# Patient Record
Sex: MALE | Race: WHITE | Employment: OTHER | URBAN - NONMETROPOLITAN AREA
[De-identification: names, ages, dates, MRNs, and addresses within clinical notes are randomized per-mention and may not be internally consistent; named-entity substitution may affect disease eponyms.]

---

## 2018-08-06 ENCOUNTER — APPOINTMENT (OUTPATIENT)
Dept: CT IMAGING | Age: 68
End: 2018-08-06
Payer: OTHER GOVERNMENT

## 2018-08-06 ENCOUNTER — HOSPITAL ENCOUNTER (OUTPATIENT)
Age: 68
Setting detail: OBSERVATION
Discharge: HOME | End: 2018-08-08
Attending: EMERGENCY MEDICINE | Admitting: INTERNAL MEDICINE
Payer: OTHER GOVERNMENT

## 2018-08-06 DIAGNOSIS — R77.8 ELEVATED TROPONIN: ICD-10-CM

## 2018-08-06 DIAGNOSIS — R07.9 CHEST PAIN, UNSPECIFIED TYPE: Primary | ICD-10-CM

## 2018-08-06 LAB
ALBUMIN SERPL-MCNC: 4.2 G/DL (ref 3.5–5.1)
ALP BLD-CCNC: 57 U/L (ref 38–126)
ALT SERPL-CCNC: 27 U/L (ref 11–66)
ANION GAP SERPL CALCULATED.3IONS-SCNC: 13 MEQ/L (ref 8–16)
AST SERPL-CCNC: 16 U/L (ref 5–40)
AVERAGE GLUCOSE: 135 MG/DL (ref 70–126)
BASOPHILS # BLD: 0.4 %
BASOPHILS ABSOLUTE: 0 THOU/MM3 (ref 0–0.1)
BILIRUB SERPL-MCNC: 2 MG/DL (ref 0.3–1.2)
BILIRUBIN DIRECT: 0.4 MG/DL (ref 0–0.3)
BUN BLDV-MCNC: 14 MG/DL (ref 7–22)
CALCIUM SERPL-MCNC: 9.3 MG/DL (ref 8.5–10.5)
CHLORIDE BLD-SCNC: 99 MEQ/L (ref 98–111)
CO2: 24 MEQ/L (ref 23–33)
CREAT SERPL-MCNC: 0.7 MG/DL (ref 0.4–1.2)
EKG ATRIAL RATE: 63 BPM
EKG P AXIS: 17 DEGREES
EKG P-R INTERVAL: 164 MS
EKG Q-T INTERVAL: 404 MS
EKG QRS DURATION: 90 MS
EKG QTC CALCULATION (BAZETT): 413 MS
EKG R AXIS: -6 DEGREES
EKG T AXIS: 46 DEGREES
EKG VENTRICULAR RATE: 63 BPM
EOSINOPHIL # BLD: 1.1 %
EOSINOPHILS ABSOLUTE: 0.1 THOU/MM3 (ref 0–0.4)
ERYTHROCYTE [DISTWIDTH] IN BLOOD BY AUTOMATED COUNT: 12 % (ref 11.5–14.5)
ERYTHROCYTE [DISTWIDTH] IN BLOOD BY AUTOMATED COUNT: 36.4 FL (ref 35–45)
GFR SERPL CREATININE-BSD FRML MDRD: > 90 ML/MIN/1.73M2
GLUCOSE BLD-MCNC: 224 MG/DL (ref 70–108)
HBA1C MFR BLD: 6.5 % (ref 4.4–6.4)
HCT VFR BLD CALC: 40.3 % (ref 42–52)
HEMOGLOBIN: 14.8 GM/DL (ref 14–18)
IMMATURE GRANS (ABS): 0.03 THOU/MM3 (ref 0–0.07)
IMMATURE GRANULOCYTES: 0.4 %
LYMPHOCYTES # BLD: 16.8 %
LYMPHOCYTES ABSOLUTE: 1.2 THOU/MM3 (ref 1–4.8)
MCH RBC QN AUTO: 31.2 PG (ref 26–33)
MCHC RBC AUTO-ENTMCNC: 36.7 GM/DL (ref 32.2–35.5)
MCV RBC AUTO: 85 FL (ref 80–94)
MONOCYTES # BLD: 10.3 %
MONOCYTES ABSOLUTE: 0.7 THOU/MM3 (ref 0.4–1.3)
NUCLEATED RED BLOOD CELLS: 0 /100 WBC
OSMOLALITY CALCULATION: 279.4 MOSMOL/KG (ref 275–300)
PLATELET # BLD: 132 THOU/MM3 (ref 130–400)
PMV BLD AUTO: 10 FL (ref 9.4–12.4)
POTASSIUM SERPL-SCNC: 4.6 MEQ/L (ref 3.5–5.2)
PRO-BNP: 60.1 PG/ML (ref 0–900)
RBC # BLD: 4.74 MILL/MM3 (ref 4.7–6.1)
SEG NEUTROPHILS: 71 %
SEGMENTED NEUTROPHILS ABSOLUTE COUNT: 5 THOU/MM3 (ref 1.8–7.7)
SODIUM BLD-SCNC: 136 MEQ/L (ref 135–145)
TOTAL PROTEIN: 7.5 G/DL (ref 6.1–8)
TROPONIN T: 0.04 NG/ML
TROPONIN T: 0.04 NG/ML
WBC # BLD: 7 THOU/MM3 (ref 4.8–10.8)

## 2018-08-06 PROCEDURE — 99285 EMERGENCY DEPT VISIT HI MDM: CPT

## 2018-08-06 PROCEDURE — 96372 THER/PROPH/DIAG INJ SC/IM: CPT

## 2018-08-06 PROCEDURE — G0378 HOSPITAL OBSERVATION PER HR: HCPCS

## 2018-08-06 PROCEDURE — 93010 ELECTROCARDIOGRAM REPORT: CPT | Performed by: INTERNAL MEDICINE

## 2018-08-06 PROCEDURE — 6360000004 HC RX CONTRAST MEDICATION: Performed by: EMERGENCY MEDICINE

## 2018-08-06 PROCEDURE — 6360000002 HC RX W HCPCS: Performed by: INTERNAL MEDICINE

## 2018-08-06 PROCEDURE — 99220 PR INITIAL OBSERVATION CARE/DAY 70 MINUTES: CPT | Performed by: INTERNAL MEDICINE

## 2018-08-06 PROCEDURE — 6370000000 HC RX 637 (ALT 250 FOR IP): Performed by: INTERNAL MEDICINE

## 2018-08-06 PROCEDURE — 83036 HEMOGLOBIN GLYCOSYLATED A1C: CPT

## 2018-08-06 PROCEDURE — 82248 BILIRUBIN DIRECT: CPT

## 2018-08-06 PROCEDURE — 80053 COMPREHEN METABOLIC PANEL: CPT

## 2018-08-06 PROCEDURE — 93005 ELECTROCARDIOGRAM TRACING: CPT | Performed by: EMERGENCY MEDICINE

## 2018-08-06 PROCEDURE — 71275 CT ANGIOGRAPHY CHEST: CPT

## 2018-08-06 PROCEDURE — 85025 COMPLETE CBC W/AUTO DIFF WBC: CPT

## 2018-08-06 PROCEDURE — 83880 ASSAY OF NATRIURETIC PEPTIDE: CPT

## 2018-08-06 PROCEDURE — 36415 COLL VENOUS BLD VENIPUNCTURE: CPT

## 2018-08-06 PROCEDURE — 84484 ASSAY OF TROPONIN QUANT: CPT

## 2018-08-06 RX ORDER — LOSARTAN POTASSIUM 100 MG/1
100 TABLET ORAL DAILY
Status: DISCONTINUED | OUTPATIENT
Start: 2018-08-07 | End: 2018-08-08 | Stop reason: HOSPADM

## 2018-08-06 RX ORDER — ASPIRIN 325 MG
325 TABLET ORAL DAILY
Status: DISCONTINUED | OUTPATIENT
Start: 2018-08-06 | End: 2018-08-08 | Stop reason: HOSPADM

## 2018-08-06 RX ORDER — LIDOCAINE 50 MG/G
1 PATCH TOPICAL EVERY 24 HOURS
Status: DISCONTINUED | OUTPATIENT
Start: 2018-08-06 | End: 2018-08-08 | Stop reason: HOSPADM

## 2018-08-06 RX ORDER — ALBUTEROL SULFATE 90 UG/1
2 AEROSOL, METERED RESPIRATORY (INHALATION) 4 TIMES DAILY
Status: DISCONTINUED | OUTPATIENT
Start: 2018-08-06 | End: 2018-08-07 | Stop reason: ALTCHOICE

## 2018-08-06 RX ORDER — ACETAMINOPHEN 325 MG/1
650 TABLET ORAL EVERY 4 HOURS PRN
Status: DISCONTINUED | OUTPATIENT
Start: 2018-08-06 | End: 2018-08-08 | Stop reason: HOSPADM

## 2018-08-06 RX ORDER — SIMVASTATIN 40 MG
40 TABLET ORAL NIGHTLY
Status: DISCONTINUED | OUTPATIENT
Start: 2018-08-06 | End: 2018-08-08 | Stop reason: HOSPADM

## 2018-08-06 RX ORDER — IBUPROFEN 400 MG/1
400 TABLET ORAL EVERY 6 HOURS PRN
Status: DISCONTINUED | OUTPATIENT
Start: 2018-08-06 | End: 2018-08-07

## 2018-08-06 RX ORDER — PANTOPRAZOLE SODIUM 40 MG/1
40 TABLET, DELAYED RELEASE ORAL
Status: DISCONTINUED | OUTPATIENT
Start: 2018-08-07 | End: 2018-08-08 | Stop reason: HOSPADM

## 2018-08-06 RX ORDER — M-VIT,TX,IRON,MINS/CALC/FOLIC 27MG-0.4MG
1 TABLET ORAL DAILY
Status: DISCONTINUED | OUTPATIENT
Start: 2018-08-07 | End: 2018-08-08 | Stop reason: HOSPADM

## 2018-08-06 RX ADMIN — ENOXAPARIN SODIUM 40 MG: 40 INJECTION SUBCUTANEOUS at 21:17

## 2018-08-06 RX ADMIN — ASPIRIN 325 MG: 325 TABLET ORAL at 21:17

## 2018-08-06 RX ADMIN — IBUPROFEN 400 MG: 400 TABLET ORAL at 21:02

## 2018-08-06 RX ADMIN — SIMVASTATIN 40 MG: 40 TABLET, FILM COATED ORAL at 21:19

## 2018-08-06 RX ADMIN — IOPAMIDOL 80 ML: 755 INJECTION, SOLUTION INTRAVENOUS at 12:24

## 2018-08-06 ASSESSMENT — PAIN SCALES - GENERAL
PAINLEVEL_OUTOF10: 4

## 2018-08-06 ASSESSMENT — PAIN DESCRIPTION - PAIN TYPE
TYPE: ACUTE PAIN

## 2018-08-06 ASSESSMENT — PAIN DESCRIPTION - DESCRIPTORS
DESCRIPTORS: ACHING
DESCRIPTORS: ACHING

## 2018-08-06 ASSESSMENT — PAIN DESCRIPTION - LOCATION
LOCATION: OTHER (COMMENT)
LOCATION: CHEST
LOCATION: CHEST

## 2018-08-06 NOTE — ED NOTES
Pt ambulated to and from bathroom with stand by assistance. Pt tolerated well.       Rosario Hoff RN  08/06/18 6760

## 2018-08-06 NOTE — ED PROVIDER NOTES
CT, Ultrasound and MRI are read by the radiologist.  CTA Chest W WO Contrast   Final Result      No acute pulmonary artery embolism. Small left pleural effusion which may be loculated and adjacent pleural calcifications. Correlate for prior infection or sepsis related lung disease. This is similar in appearance to prior study. Gallstones. **This report has been created using voice recognition software. It may contain minor errors which are inherent in voice recognition technology. **      Final report electronically signed by Dr. Quynh Garcia on 8/6/2018 12:56 PM          LABS:   Labs Reviewed   CBC WITH AUTO DIFFERENTIAL - Abnormal; Notable for the following:        Result Value    Hematocrit 40.3 (*)     MCHC 36.7 (*)     All other components within normal limits   BASIC METABOLIC PANEL - Abnormal; Notable for the following:     Glucose 224 (*)     All other components within normal limits   HEPATIC FUNCTION PANEL - Abnormal; Notable for the following: Total Bilirubin 2.0 (*)     Bilirubin, Direct 0.4 (*)     All other components within normal limits   TROPONIN - Abnormal; Notable for the following:     Troponin T 0.045 (*)     All other components within normal limits   BRAIN NATRIURETIC PEPTIDE   ANION GAP   GLOMERULAR FILTRATION RATE, ESTIMATED   OSMOLALITY       EMERGENCY DEPARTMENT COURSE:   Vitals:    Vitals:    08/06/18 0939 08/06/18 1142 08/06/18 1252   BP: 139/89 125/81 134/72   Pulse: 61 63 61   Resp: 20 18 18   Temp: 98 °F (36.7 °C)     TempSrc: Oral     SpO2: 96% 96% 97%   Weight: 290 lb (131.5 kg)     Height: 5' 10\" (1.778 m)       He is pain-free at the time of evaluation but he does get symptoms when he exerts himself. His troponin is actually a bit elevated. I discussed case with the hospitalist to arrange for admission. He is to be negative for pulmonary embolism. CRITICAL CARE:   10 min    CONSULTS:  Dr Newman Epp:  None    FINAL IMPRESSION      1.  Chest pain, unspecified type    2. Elevated troponin          DISPOSITION/PLAN   Admitted    DISCHARGE MEDICATIONS:  New Prescriptions    No medications on file       (Please note that portions of this note were completed with a voice recognition program.  Efforts were made to edit the dictations but occasionally words are mis-transcribed.)    Scribe:  Sofia Bradley 8/6/18 11:00 AM Scribing for and in the presence of Timothy Barber DO. Signed by: Eladio Ellis. 8/6/18 1:46 PM    Provider:  I personally performed the services described in the documentation, reviewed and edited the documentation which was dictated to the scribe in my presence, and it accurately records my words and actions.     Timothy Barber DO 8/6/18 1:46 PM       Timothy Barber DO  08/06/18 2001

## 2018-08-06 NOTE — ED TRIAGE NOTES
Patient presents to ED with complaint of chest pressure that started 2 days ago. Patient reports he had an episode similar to this 3 months ago, took some tums and felt relief. Tried tums this time around and has had no relief. Patient reports he was trimming shrubs on Saturday when this pain started. States he is short of breath with it - worsens when bending over. Patient has had open heart in 2003, but denies any heart problems since then. Patient currently pain 2/10, but reports it increases with coughing or moving around.

## 2018-08-06 NOTE — PROGRESS NOTES
Pt admitted to  6K22 in a wheelchair. Complaints: Chest pain / discomfort. IV in the forearm right, condition patent and no redness. IV site free of s/s of infection or infiltration. Vital signs obtained. Assessment and data collection initiated. Two nurse skin assessment performed by Naima Conner and [facility] RN. Oriented to room. Policies and procedures for 6K explained. All questions answered with no further questions at this time. Fall prevention and safety brochure discussed with patient. Bed alarm on. Call light in reach. The best day to schedule a follow up Dr appointment is: Wednesday a.m.

## 2018-08-06 NOTE — ED NOTES
Pt updated on admission process. Pt provided with turkey sandwich and emery mist. No concerns voiced.      Nicole Dubon, RN  08/06/18 3774

## 2018-08-07 LAB
ANION GAP SERPL CALCULATED.3IONS-SCNC: 13 MEQ/L (ref 8–16)
BUN BLDV-MCNC: 14 MG/DL (ref 7–22)
CALCIUM SERPL-MCNC: 9.2 MG/DL (ref 8.5–10.5)
CHLORIDE BLD-SCNC: 101 MEQ/L (ref 98–111)
CO2: 21 MEQ/L (ref 23–33)
CREAT SERPL-MCNC: 0.7 MG/DL (ref 0.4–1.2)
ERYTHROCYTE [DISTWIDTH] IN BLOOD BY AUTOMATED COUNT: 12.3 % (ref 11.5–14.5)
ERYTHROCYTE [DISTWIDTH] IN BLOOD BY AUTOMATED COUNT: 39.3 FL (ref 35–45)
GFR SERPL CREATININE-BSD FRML MDRD: > 90 ML/MIN/1.73M2
GLUCOSE BLD-MCNC: 194 MG/DL (ref 70–108)
HCT VFR BLD CALC: 39.3 % (ref 42–52)
HEMOGLOBIN: 13.7 GM/DL (ref 14–18)
MCH RBC QN AUTO: 30.6 PG (ref 26–33)
MCHC RBC AUTO-ENTMCNC: 34.9 GM/DL (ref 32.2–35.5)
MCV RBC AUTO: 87.7 FL (ref 80–94)
PLATELET # BLD: 123 THOU/MM3 (ref 130–400)
PMV BLD AUTO: 9.9 FL (ref 9.4–12.4)
POTASSIUM SERPL-SCNC: 4.4 MEQ/L (ref 3.5–5.2)
RBC # BLD: 4.48 MILL/MM3 (ref 4.7–6.1)
SODIUM BLD-SCNC: 135 MEQ/L (ref 135–145)
TROPONIN T: 0.04 NG/ML
WBC # BLD: 6.6 THOU/MM3 (ref 4.8–10.8)

## 2018-08-07 PROCEDURE — 84484 ASSAY OF TROPONIN QUANT: CPT

## 2018-08-07 PROCEDURE — 6360000002 HC RX W HCPCS: Performed by: INTERNAL MEDICINE

## 2018-08-07 PROCEDURE — 85027 COMPLETE CBC AUTOMATED: CPT

## 2018-08-07 PROCEDURE — 96372 THER/PROPH/DIAG INJ SC/IM: CPT

## 2018-08-07 PROCEDURE — 99225 PR SBSQ OBSERVATION CARE/DAY 25 MINUTES: CPT | Performed by: INTERNAL MEDICINE

## 2018-08-07 PROCEDURE — G0378 HOSPITAL OBSERVATION PER HR: HCPCS

## 2018-08-07 PROCEDURE — 80048 BASIC METABOLIC PNL TOTAL CA: CPT

## 2018-08-07 PROCEDURE — 36415 COLL VENOUS BLD VENIPUNCTURE: CPT

## 2018-08-07 PROCEDURE — 6370000000 HC RX 637 (ALT 250 FOR IP): Performed by: INTERNAL MEDICINE

## 2018-08-07 RX ORDER — IBUPROFEN 400 MG/1
400 TABLET ORAL EVERY 6 HOURS PRN
Status: DISCONTINUED | OUTPATIENT
Start: 2018-08-07 | End: 2018-08-08 | Stop reason: HOSPADM

## 2018-08-07 RX ADMIN — ASPIRIN 325 MG: 325 TABLET ORAL at 21:19

## 2018-08-07 RX ADMIN — LOSARTAN POTASSIUM 100 MG: 100 TABLET, FILM COATED ORAL at 07:55

## 2018-08-07 RX ADMIN — ENOXAPARIN SODIUM 40 MG: 40 INJECTION SUBCUTANEOUS at 21:06

## 2018-08-07 RX ADMIN — IBUPROFEN 400 MG: 400 TABLET ORAL at 21:20

## 2018-08-07 RX ADMIN — PANTOPRAZOLE SODIUM 40 MG: 40 TABLET, DELAYED RELEASE ORAL at 04:46

## 2018-08-07 RX ADMIN — MULTIPLE VITAMINS W/ MINERALS TAB 1 TABLET: TAB at 07:55

## 2018-08-07 RX ADMIN — SIMVASTATIN 40 MG: 40 TABLET, FILM COATED ORAL at 21:06

## 2018-08-07 RX ADMIN — ENOXAPARIN SODIUM 40 MG: 40 INJECTION SUBCUTANEOUS at 07:55

## 2018-08-07 ASSESSMENT — PAIN SCALES - GENERAL
PAINLEVEL_OUTOF10: 0
PAINLEVEL_OUTOF10: 4
PAINLEVEL_OUTOF10: 0

## 2018-08-07 NOTE — PROGRESS NOTES
72 hours. No results for input(s): Nighat Funk in the last 72 hours. Urinalysis:    No results found for: Delmon Maria Ines, BACTERIA, RBCUA, BLOODU, [address], [address]    Radiology:  CTA Chest W WO Contrast   Final Result      No acute pulmonary artery embolism. Small left pleural effusion which may be loculated and adjacent pleural calcifications. Correlate for prior infection or sepsis related lung disease. This is similar in appearance to prior study. Gallstones. **This report has been created using voice recognition software. It may contain minor errors which are inherent in voice recognition technology. **      Final report electronically signed by Dr. Leslie Vanegas on 8/6/2018 12:56 PM        Cta Chest W Wo Contrast    Result Date: 8/6/2018  PROCEDURE: CTA CHEST W WO CONTRAST CLINICAL INFORMATION: r/o PE. COMPARISON: No prior study. TECHNIQUE: 1.5 mm axial images were obtained through the chest after the administration of IV contrast.  A non-contrast localizer was obtained. 3D reconstructions were performed on the scanner to include sagittal and bilateral oblique images through the  chest. 80 mL Isovue-370 were administered intravenously. All CT scans at this facility use dose modulation, iterative reconstruction, and/or weight-based dosing when appropriate to reduce radiation dose to as low as reasonably achievable. FINDINGS: Thyroid gland is unremarkable. Thoracic aorta is normal caliber. Adequate opacification of the pulmonary artery. No acute pulmonary artery embolism. Pleural calcifications suggesting prior infection or asbestos exposure. Small left pleural effusion which may be loculated or chronic. There is adjacent consolidation or atelectasis/scarring. No cardiomegaly. Atherosclerotic changes and coronary artery calcifications. No pericardial effusion. Median sternotomy. No acute osseous findings. Upper abdominal images demonstrate gallstones. Fatty infiltration of the liver.

## 2018-08-07 NOTE — CARE COORDINATION
participate in local refill/ meds to beds program?  No  Type of Home Care Services:  None  Patient expects to be discharged to:  Home with wife  Expected Discharge date:  08/07/18  Follow Up Appointment: Best Day/ Time: Wednesday AM    Discharge Plan: Met with Christina Salgado. He currently lives at home with his wife. Plan is to return home at discharge. Denies need for DME or HH. [facility] [address] [facility] and patients wish to remain here.      Evaluation: no

## 2018-08-07 NOTE — PLAN OF CARE
Problem: Falls - Risk of:  Goal: Will remain free from falls  Will remain free from falls   Outcome: Ongoing  Call light within reach. Side rails up x2. Bed alarm on. Non skid slippers available. Problem: Pain:  Goal: Pain level will decrease  Pain level will decrease   Outcome: Ongoing  Patient free from pain this shift. Pain rated on 0-10 pain rating scale. Will continue to reassess. Comments: Care plan reviewed with patient. Patient verbalize understanding of the plan of care and contribute to goal setting.

## 2018-08-07 NOTE — H&P
pain.  No nausea, vomiting, diarrhea, melena, or bright red  blood per rectum. No dysuria. No unintentional weight loss. The rest of  the review of systems is negative with exception of what is described in  the HPI. PAST MEDICAL HISTORY:  The patient has a history of coronary artery  disease, ischemic cardiomyopathy, pulmonary embolism, hypertension, and  hyperlipidemia. PAST SURGICAL HISTORY:  CABG, colonoscopy, hydrocele, inguinal hernia  repair, inferior vena cava filter, and knee surgery. FAMILY HISTORY:  Mother with cancer, high blood pressure, and kidney  disease. Father with heart disease. SOCIAL HISTORY:  The patient is a former smoker. No alcohol use. No  illicit drug use. MEDICATION RECONCILIATION:  The patient takes the following medications at  home:  Combivent, losartan, simvastatin, omega-3 fatty acids,  multivitamins, glucosamine, and aspirin 325 mg daily. PHYSICAL EXAMINATION:  VITAL SIGNS:  Temperature 98, blood pressure 134/72, heart rate is 61,  respiratory rate 18, and saturation 98% on room air. GENERAL:  The patient is lying on the bed, in no distress. HEENT:  PERRLA and EOMI. NECK:  No JVD. CARDIOVASCULAR:  Regular rate and rhythm. Systolic murmur in aortic area. No pericardial rub. RESPIRATORY:  Clear to auscultation bilaterally. ABDOMEN:  Soft, nontender, and nondistended. Positive bowel sounds. :  Negative suprapubic. No CVA tenderness. EXTREMITIES:  Lower extremities without edema. NEUROLOGIC:  Alert and oriented x3 with no focal deficit. MUSCULOSKELETAL:  Range of motion preserved in all extremities. SKIN:  No hematoma, petechiae, or ecchymosis. PSYCHIATRIC:  Proper affect. LABORATORY DATA:  The patient had a sodium of 136, potassium 4.6, chloride  is 99, CO2 24, BUN 14, creatinine 0.7, anion gap of 13, glucose of 224,  calcium 9.3. BNP of 60. Troponin 0.045. Albumin of 4.2, bilirubin 2.0,  ALT/AST 27/16, and alkaline phosphatase 57. White count of 7, hemoglobin  14.8, and platelet count of 264. The patient does not have a history of  diabetes. ASSESSMENT AND PLAN:  The patient is a 41-year-old patient with past  medical history of coronary artery disease, ischemic cardiomyopathy, status  post revascularization and CABG who comes to the hospital with chest pain. PROBLEMS:  1. Chest pain, more pleuritic in nature probably secondary to pleuritis. No evidence of pericarditis on EKG. Clinically, looks like it was  pleuritis. Upon the request of the ER physician, the patient will stay  under observation for his troponin cycling x3. In the meantime, we will  keep him on telemetry. The patient received his dose of aspirin. We will  make sure the patient's heart rate remains below 80. At this time, the  patient is not going to be started on heparin drip since his pain is fully  resolved. Clinically, by history, does not look like angina equivalent. The patient reports that his pain gets worse when he coughs. Then, also  can be musculoskeletal because of his work in the yard. So, we will make  sure the patient has morphine as needed and nitroglycerin p.r.n.  2.  For hyperglycemia, the patient has no _____ diagnosis of diabetes  mellitus type 2. We will do an HbA1c. Regular diet for now. 3.  For hypertension, the patient will be started again on losartan. 4.  For hypercholesterolemia, we will continue with simvastatin. 5.  For his asbestosis/ILD, we will continue with his Combivent. 6.  For GI prophylaxis, the patient will be on Protonix and for DVT  prophylaxis, Lovenox.         Rebecca Delgadillo MD    D: 08/06/2018 14:46:32       T: 08/06/2018 16:22:18     [email or web address]  Job#: [id number]     Doc#: [id number]    CC:

## 2018-08-08 VITALS
HEART RATE: 56 BPM | TEMPERATURE: 97.6 F | HEIGHT: 70 IN | BODY MASS INDEX: 40.7 KG/M2 | RESPIRATION RATE: 18 BRPM | SYSTOLIC BLOOD PRESSURE: 138 MMHG | DIASTOLIC BLOOD PRESSURE: 78 MMHG | OXYGEN SATURATION: 97 % | WEIGHT: 284.3 LBS

## 2018-08-08 LAB — TROPONIN T: < 0.01 NG/ML

## 2018-08-08 PROCEDURE — 6360000002 HC RX W HCPCS: Performed by: INTERNAL MEDICINE

## 2018-08-08 PROCEDURE — 99217 PR OBSERVATION CARE DISCHARGE MANAGEMENT: CPT | Performed by: INTERNAL MEDICINE

## 2018-08-08 PROCEDURE — 6370000000 HC RX 637 (ALT 250 FOR IP): Performed by: INTERNAL MEDICINE

## 2018-08-08 PROCEDURE — G0378 HOSPITAL OBSERVATION PER HR: HCPCS

## 2018-08-08 PROCEDURE — 84484 ASSAY OF TROPONIN QUANT: CPT

## 2018-08-08 PROCEDURE — 99231 SBSQ HOSP IP/OBS SF/LOW 25: CPT | Performed by: NURSE PRACTITIONER

## 2018-08-08 PROCEDURE — 96372 THER/PROPH/DIAG INJ SC/IM: CPT

## 2018-08-08 PROCEDURE — 36415 COLL VENOUS BLD VENIPUNCTURE: CPT

## 2018-08-08 RX ADMIN — LOSARTAN POTASSIUM 100 MG: 100 TABLET, FILM COATED ORAL at 08:15

## 2018-08-08 RX ADMIN — MULTIPLE VITAMINS W/ MINERALS TAB 1 TABLET: TAB at 08:15

## 2018-08-08 RX ADMIN — ENOXAPARIN SODIUM 40 MG: 40 INJECTION SUBCUTANEOUS at 08:15

## 2018-08-08 RX ADMIN — PANTOPRAZOLE SODIUM 40 MG: 40 TABLET, DELAYED RELEASE ORAL at 04:55

## 2018-08-08 ASSESSMENT — PAIN SCALES - GENERAL
PAINLEVEL_OUTOF10: 0
PAINLEVEL_OUTOF10: 0

## 2018-08-08 NOTE — PROGRESS NOTES
Cardiology Progress Note      Patient:  Teodora Webb  YOB: 1950  MRN: [id number]   Acct: [de-identified]  Admit Date:  8/6/2018  Primary Cardiologist:  Dr. Meghna Smith, seen by Dr. Rudy Spann    Chief Complaint: Epigastric chest pain    Subjective (Events in last 24 hours): Resting in bedside chair talking with spouse. Alert, in nad. Denies chest pain, palpitations, sob. States pain has been relieved with lidoderm patch and aleve.       Objective:   BP (!) 144/80   Pulse 55   Temp 97.8 °F (36.6 °C) (Oral)   Resp 18   Ht 5' 10\" (1.778 m)   Wt 284 lb 4.8 oz (129 kg)   SpO2 97%   BMI 40.79 kg/m²        TELEMETRY: SR rate in the 70's    Physical Exam:  General Appearance: alert and oriented to person, place and time, in no acute distress  Cardiovascular: normal rate, regular rhythm, normal S1 and S2, no murmurs, rubs, clicks, or gallops, distal pulses intact, no carotid bruits, no JVD  Pulmonary/Chest: clear to auscultation bilaterally- no wheezes, rales or rhonchi, normal air movement, no respiratory distress  Abdomen: soft, non-tender, non-distended, normal bowel sounds, no masses   Extremities: no cyanosis, clubbing or edema, pulses palpable   Skin: warm and dry, no rash or erythema  Head: normocephalic and atraumatic  Eyes: pupils equal, round, and reactive to light  Neck: supple and non-tender without mass, no thyromegaly   Musculoskeletal: normal range of motion, no joint swelling, deformity or tenderness  Neurological: alert, oriented, normal speech, no focal findings or movement disorder noted    Medications:    albuterol-ipratropium  1 puff Inhalation 4x Daily    aspirin  325 mg Oral Daily    losartan  100 mg Oral Daily    therapeutic multivitamin-minerals  1 tablet Oral Daily    simvastatin  40 mg Oral Nightly    pantoprazole  40 mg Oral QAM AC    lidocaine  1 patch Transdermal Q24H    enoxaparin  40 mg Subcutaneous Q12H         ibuprofen 400 mg Q6H PRN   acetaminophen 650 mg Q4H PRN Diagnostics:  EK18  Normal sinus rhythm  Normal ECG  When compared with ECG of 2015 09:26,  No significant change was found  Confirmed by Paul Manuel ([id number]) on 2018 9:14:51 PM    Stress Echo from [facility]: 3/15/18  Conclusions:     Mild right ventricular enlargement, normal systolic function    Normal aortic root size    Mild to moderate aortic sclerosis, aortic valve area 2.1 cm2    Mild left atrial enlargement, 4.5 cm    Mild mitral leaflet thickening, normal excursion    Mild left ventricular hypertrophy    Normal left ventricular systolic function, ejection fraction 60%    Mild proximal septal hypertrophy    No pericardial effusion    Descending thoracic aorta, 2.8 cm    Sinus bradycardia, 49 bpm    Apical 4cv left ventricular end diastolic volume 349 ml    Diastolic compliance abnormality    Stress echo:    Resting ecg sinus rhythm, normal ecg    A Kyle protocol was used, and he walked five minutes and 52 seconds,  and he achieved 61% of the mphr    This was a submaximal heart rate response, with a good functional  capacity    Study limited due to fatigue, no angina, mild sob    Non diagnostic stress, with no evidence of ischemia by ecg nor echo    Impression: non diagnostic stressecho    The patient achieved a maximum heart rate of 94.      Findings Rest    Reason For Study:  Fatigue.   Shortness of breath.    Left Ventricle: The left ventricular chamber size is normal.  Moderate concentric left  ventricular hypertrophy is observed.  Global left ventricular wall  motion and contractility are within normal limits.  The LV Ejection  Fraction is 60%.    There is an E to A reversal in the mitral valve flow  pattern suggestive of diastolic dysfunction.  NO systolic regional wall  motion abnormalities visualized. A septal notch is visuzlized with no  systolic anterior motion.    Left Atrium:  The left atrium is mildly dilated.    Right Ventricle:   The right ventricle is none  IVS:LVPW ratio (2D)           1.01 ratio             none  LVIDd (2D)                    5.1 cm                 none  LVIDs (2D)                    2.98 cm                none  LV FS (Teichholz) (2D)        41.6 %                 none  LV FS (cube) (2D)             41.6 %                 none  EF Teichholz (2D)             72.3 %                 none  Ao root diameter (2D)         3.2 cm                 none  LA dimension (AP) 2D          4.5 cm                 none  LA:Ao ratio (2D)              1.34 ratio             none    Volumes/Mass  Name                          Value                  Normal Range     LV EDV SP 4CH (MOD)           139 ml                 none  LV ESV SP 4CH (MOD)           43.4 ml                none  EF SP 4CH (MOD)               69 %                   none  LV EDV SP 2CH (MOD)           105 ml                 none  LV ESV SP 2CH (MOD)           39.4 ml                none  EF SP 2CH (MOD)               62.5 %                 none  LV EDV BP                     125 ml                 none  LV ESV BP                     42.8 ml                none  BP EF (MOD)                   65.8 %                 none    Diastolic/Systolic Function  Name                          Value                  Normal Range     MV E-wave Vmax                0.57 m/sec             none  MV A-wave Vmax                0.83 m/sec             none  MV E:A ratio                  0.7 ratio              (1.1 - 1.5)  P. vein S-wave Vmax           0.52 m/sec             none  P. vein D-wave Vmax           0.37 m/sec             none  P. vein S:D Vmax ratio        1.4 ratio              none  LV E:e' septal ratio          9.2 ratio              none  LV E:e' lateral ratio         6.3 ratio              none    Aortic Valve  Name                          Value                  Normal Range     AV Vmax                       2.12 m/sec             (1 - 1.7)  AV VTI                        48. 5 cm                none  AV

## 2018-08-09 NOTE — DISCHARGE SUMMARY
inhaler  Generic drug:  albuterol-ipratropium     FISH OIL PO     GLUCOSAMINE CHONDR COMPLEX PO     losartan 100 MG tablet  Commonly known as:  COZAAR     MULTIVITAMIN PO     simvastatin 40 MG tablet  Commonly known as:  ZOCOR     SINUS RINSE NA        STOP taking these medications    RICHARD-DURAN PLUS-D SINUS/COLD 30-2- MG Caps  Generic drug:  [id number]            PRN Meds:     No intake or output data in the 24 hours ending 08/09/18 1614  Diet:    Exam:    /78   Pulse 56   Temp 97.6 °F (36.4 °C) (Oral)   Resp 18   Ht 5' 10\" (1.778 m)   Wt 284 lb 4.8 oz (129 kg)   SpO2 97%   BMI 40.79 kg/m²     General appearance: No apparent distress, appears stated age and cooperative. HEENT: Pupils equal, round, and reactive to light. Conjunctivae/corneas clear. Neck: Supple, with full range of motion. No jugular venous distention. Trachea midline. Respiratory:  Normal respiratory effort. Clear to auscultation, bilaterally without Rales/Wheezes/Rhonchi. Cardiovascular: Regular rate and rhythm with normal S1/S2 without murmurs, rubs or gallops. Abdomen: Soft, non-tender, non-distended with normal bowel sounds. Musculoskeletal: passive and active ROM x 4 extremities. Skin: Skin color, texture, turgor normal.  No rashes or lesions. Neurologic:  Neurovascularly intact without any focal sensory/motor deficits. Cranial nerves: II-XII intact, grossly non-focal.  Psychiatric: Alert and oriented, thought content appropriate, normal insight  Capillary Refill: Brisk,< 3 seconds   Peripheral Pulses: +2 palpable, equal bilaterally         Labs:   Recent Labs      08/07/18   0540   WBC  6.6   HGB  13.7*   HCT  39.3*   PLT  123*     Recent Labs      08/07/18   0540   NA  135   K  4.4   CL  101   CO2  21*   BUN  14   CREATININE  0.7   CALCIUM  9.2     No results for input(s): AST, ALT, BILIDIR, BILITOT, ALKPHOS in the last 72 hours. No results for input(s): INR in the last 72 hours.   No results for input(s): Colton Lakhani in the last 72 hours. Urinalysis:    No results found for: Gus Dakota, BACTERIA, RBCUA, BLOODU, [address], [address]    Radiology:  CTA Chest W WO Contrast   Final Result      No acute pulmonary artery embolism. Small left pleural effusion which may be loculated and adjacent pleural calcifications. Correlate for prior infection or sepsis related lung disease. This is similar in appearance to prior study. Gallstones. **This report has been created using voice recognition software. It may contain minor errors which are inherent in voice recognition technology. **      Final report electronically signed by Dr. Jazmin Rosas on 8/6/2018 12:56 PM        Cta Chest W Wo Contrast    Result Date: 8/6/2018  PROCEDURE: CTA CHEST W WO CONTRAST CLINICAL INFORMATION: r/o PE. COMPARISON: No prior study. TECHNIQUE: 1.5 mm axial images were obtained through the chest after the administration of IV contrast.  A non-contrast localizer was obtained. 3D reconstructions were performed on the scanner to include sagittal and bilateral oblique images through the  chest. 80 mL Isovue-370 were administered intravenously. All CT scans at this facility use dose modulation, iterative reconstruction, and/or weight-based dosing when appropriate to reduce radiation dose to as low as reasonably achievable. FINDINGS: Thyroid gland is unremarkable. Thoracic aorta is normal caliber. Adequate opacification of the pulmonary artery. No acute pulmonary artery embolism. Pleural calcifications suggesting prior infection or asbestos exposure. Small left pleural effusion which may be loculated or chronic. There is adjacent consolidation or atelectasis/scarring. No cardiomegaly. Atherosclerotic changes and coronary artery calcifications. No pericardial effusion. Median sternotomy. No acute osseous findings. Upper abdominal images demonstrate gallstones. Fatty infiltration of the liver.      No acute pulmonary artery embolism. Small left pleural effusion which may be loculated and adjacent pleural calcifications. Correlate for prior infection or sepsis related lung disease. This is similar in appearance to prior study. Gallstones. **This report has been created using voice recognition software. It may contain minor errors which are inherent in voice recognition technology. ** Final report electronically signed by Dr. Jazmin Rosas on 8/6/2018 12:56 PM          Assessment/Plan:    Active Problems:    Chest pain  Resolved Problems:    * No resolved hospital problems.  *    Chest pain with elevated troponin- had recent stress test and echo which was normal at [facility] seen by our cardiology okay for discharge  Patient can follow up with his cardiologist in 1 week time  PCP in 1 week time    Discharge time 25 minutes      Code Status: Prior      Sae Chairez MD

## 2019-12-18 RX ORDER — AZITHROMYCIN 250 MG/1
250 TABLET, FILM COATED ORAL SEE ADMIN INSTRUCTIONS
Qty: 6 TABLET | Refills: 0 | Status: SHIPPED | OUTPATIENT
Start: 2019-12-18 | End: 2019-12-23

## 2021-03-04 ENCOUNTER — HOSPITAL ENCOUNTER (OUTPATIENT)
Dept: ULTRASOUND IMAGING | Age: 71
Discharge: HOME | End: 2021-03-04
Payer: OTHER GOVERNMENT

## 2021-03-04 DIAGNOSIS — R10.11 RUQ PAIN: ICD-10-CM

## 2021-03-04 PROCEDURE — 76705 ECHO EXAM OF ABDOMEN: CPT

## 2021-03-26 ENCOUNTER — HOSPITAL ENCOUNTER (OUTPATIENT)
Age: 71
Discharge: HOME | End: 2021-03-26
Payer: OTHER GOVERNMENT

## 2021-03-26 LAB
ALBUMIN SERPL-MCNC: 4.5 G/DL (ref 3.5–5.1)
ALP BLD-CCNC: 61 U/L (ref 38–126)
ALT SERPL-CCNC: 39 U/L (ref 11–66)
ANION GAP SERPL CALCULATED.3IONS-SCNC: 11 MEQ/L (ref 8–16)
AST SERPL-CCNC: 30 U/L (ref 5–40)
BASOPHILS # BLD: 0.8 %
BASOPHILS ABSOLUTE: 0 THOU/MM3 (ref 0–0.1)
BILIRUB SERPL-MCNC: 1.5 MG/DL (ref 0.3–1.2)
BUN BLDV-MCNC: 11 MG/DL (ref 7–22)
CALCIUM SERPL-MCNC: 9.3 MG/DL (ref 8.5–10.5)
CHLORIDE BLD-SCNC: 100 MEQ/L (ref 98–111)
CO2: 24 MEQ/L (ref 23–33)
CREAT SERPL-MCNC: 0.7 MG/DL (ref 0.4–1.2)
EOSINOPHIL # BLD: 5.6 %
EOSINOPHILS ABSOLUTE: 0.2 THOU/MM3 (ref 0–0.4)
ERYTHROCYTE [DISTWIDTH] IN BLOOD BY AUTOMATED COUNT: 12.3 % (ref 11.5–14.5)
ERYTHROCYTE [DISTWIDTH] IN BLOOD BY AUTOMATED COUNT: 39.4 FL (ref 35–45)
FERRITIN: 274 NG/ML (ref 22–322)
GFR SERPL CREATININE-BSD FRML MDRD: > 90 ML/MIN/1.73M2
GLUCOSE BLD-MCNC: 148 MG/DL (ref 70–108)
HBV SURFACE AB TITR SER: NEGATIVE {TITER}
HCT VFR BLD CALC: 39.9 % (ref 42–52)
HEMOGLOBIN: 13.9 GM/DL (ref 14–18)
HEPATITIS B SURFACE ANTIGEN: NEGATIVE
IGA: 250 MG/DL (ref 70–400)
IMMATURE GRANS (ABS): 0.01 THOU/MM3 (ref 0–0.07)
IMMATURE GRANULOCYTES: 0.3 %
IRON: 108 UG/DL (ref 65–195)
LYMPHOCYTES # BLD: 25.4 %
LYMPHOCYTES ABSOLUTE: 1 THOU/MM3 (ref 1–4.8)
MCH RBC QN AUTO: 31 PG (ref 26–33)
MCHC RBC AUTO-ENTMCNC: 34.8 GM/DL (ref 32.2–35.5)
MCV RBC AUTO: 88.9 FL (ref 80–94)
MONOCYTES # BLD: 9.8 %
MONOCYTES ABSOLUTE: 0.4 THOU/MM3 (ref 0.4–1.3)
NUCLEATED RED BLOOD CELLS: 0 /100 WBC
PLATELET # BLD: 155 THOU/MM3 (ref 130–400)
PMV BLD AUTO: 10.2 FL (ref 9.4–12.4)
POTASSIUM SERPL-SCNC: 4.4 MEQ/L (ref 3.5–5.2)
RBC # BLD: 4.49 MILL/MM3 (ref 4.7–6.1)
SEG NEUTROPHILS: 58.1 %
SEGMENTED NEUTROPHILS ABSOLUTE COUNT: 2.2 THOU/MM3 (ref 1.8–7.7)
SODIUM BLD-SCNC: 135 MEQ/L (ref 135–145)
TOTAL IRON BINDING CAPACITY: 284 UG/DL (ref 171–450)
TOTAL PROTEIN: 7.5 G/DL (ref 6.1–8)
WBC # BLD: 3.8 THOU/MM3 (ref 4.8–10.8)

## 2021-03-26 PROCEDURE — 82784 ASSAY IGA/IGD/IGG/IGM EACH: CPT

## 2021-03-26 PROCEDURE — 83540 ASSAY OF IRON: CPT

## 2021-03-26 PROCEDURE — 36415 COLL VENOUS BLD VENIPUNCTURE: CPT

## 2021-03-26 PROCEDURE — 82728 ASSAY OF FERRITIN: CPT

## 2021-03-26 PROCEDURE — 83550 IRON BINDING TEST: CPT

## 2021-03-26 PROCEDURE — 86255 FLUORESCENT ANTIBODY SCREEN: CPT

## 2021-03-26 PROCEDURE — 82103 ALPHA-1-ANTITRYPSIN TOTAL: CPT

## 2021-03-26 PROCEDURE — 86708 HEPATITIS A ANTIBODY: CPT

## 2021-03-26 PROCEDURE — 86706 HEP B SURFACE ANTIBODY: CPT

## 2021-03-26 PROCEDURE — 83516 IMMUNOASSAY NONANTIBODY: CPT

## 2021-03-26 PROCEDURE — 85025 COMPLETE CBC W/AUTO DIFF WBC: CPT

## 2021-03-26 PROCEDURE — 82104 ALPHA-1-ANTITRYPSIN PHENO: CPT

## 2021-03-26 PROCEDURE — 87340 HEPATITIS B SURFACE AG IA: CPT

## 2021-03-26 PROCEDURE — 86038 ANTINUCLEAR ANTIBODIES: CPT

## 2021-03-26 PROCEDURE — 80053 COMPREHEN METABOLIC PANEL: CPT

## 2021-03-27 LAB — HAV AB SERPL IA-ACNC: NONREACTIVE

## 2021-03-28 LAB
ALPHA-1 ANTITRYPSIN PHENOTYPE: NORMAL
ALPHA-1 ANTITRYPSIN: 103 MG/DL (ref 90–200)
ANA SCREEN: NORMAL
F-ACTIN AB IGG: 9 UNITS (ref 0–19)
MITOCHONDRIAL ANTIBODY: 63.1 UNITS (ref 0–24.9)
TISSUE TRANSGLUTAMINASE IGA: 0.6 U/ML

## 2021-03-29 LAB — TTG, IGG: < 0.6 U/ML

## 2021-03-30 LAB — NEUTROPHIL CYTOPLASMIC AB IGG: NORMAL

## 2021-04-19 ENCOUNTER — HOSPITAL ENCOUNTER (OUTPATIENT)
Dept: NUCLEAR MEDICINE | Age: 71
Discharge: HOME | End: 2021-04-19
Payer: MEDICARE

## 2021-04-19 DIAGNOSIS — R10.11 RIGHT UPPER QUADRANT PAIN: ICD-10-CM

## 2021-04-19 DIAGNOSIS — K80.80 OTHER CHOLELITHIASIS WITHOUT OBSTRUCTION: ICD-10-CM

## 2021-04-19 PROCEDURE — A9537 TC99M MEBROFENIN: HCPCS | Performed by: NURSE PRACTITIONER

## 2021-04-19 PROCEDURE — 3430000000 HC RX DIAGNOSTIC RADIOPHARMACEUTICAL: Performed by: NURSE PRACTITIONER

## 2021-04-19 PROCEDURE — 78226 HEPATOBILIARY SYSTEM IMAGING: CPT

## 2021-04-19 RX ADMIN — Medication 8.3 MILLICURIE: at 10:55

## 2021-09-22 LAB
ALBUMIN SERPL-MCNC: ABNORMAL G/DL
ALP BLD-CCNC: ABNORMAL U/L
ALT SERPL-CCNC: 49 U/L
ANION GAP SERPL CALCULATED.3IONS-SCNC: ABNORMAL MMOL/L
AST SERPL-CCNC: 28 U/L
AVERAGE GLUCOSE: NORMAL
BILIRUB SERPL-MCNC: 1.7 MG/DL (ref 0.1–1.4)
BUN BLDV-MCNC: ABNORMAL MG/DL
CALCIUM SERPL-MCNC: ABNORMAL MG/DL
CHLORIDE BLD-SCNC: ABNORMAL MMOL/L
CHOLESTEROL, TOTAL: 105 MG/DL
CHOLESTEROL/HDL RATIO: ABNORMAL
CO2: 29 MMOL/L
CREAT SERPL-MCNC: 0.84 MG/DL
GFR CALCULATED: ABNORMAL
GLUCOSE BLD-MCNC: 153 MG/DL
HBA1C MFR BLD: 6.5 %
HCT VFR BLD CALC: 37.9 % (ref 41–53)
HDLC SERPL-MCNC: 31 MG/DL (ref 35–70)
HEMOGLOBIN: 13.7 G/DL (ref 13.5–17.5)
LDL CHOLESTEROL CALCULATED: 55 MG/DL (ref 0–160)
NONHDLC SERPL-MCNC: ABNORMAL MG/DL
PLATELET # BLD: 144 K/ΜL
POTASSIUM SERPL-SCNC: 4.3 MMOL/L
PROSTATE SPECIFIC ANTIGEN: 0.73 NG/ML
SODIUM BLD-SCNC: 140 MMOL/L
TOTAL PROTEIN: ABNORMAL
TRIGL SERPL-MCNC: 94 MG/DL
VLDLC SERPL CALC-MCNC: ABNORMAL MG/DL
WBC # BLD: 4.7 10^3/ML

## 2021-10-08 ENCOUNTER — HOSPITAL ENCOUNTER (OUTPATIENT)
Dept: GENERAL RADIOLOGY | Age: 71
Discharge: HOME | End: 2021-10-08
Payer: OTHER GOVERNMENT

## 2021-10-08 ENCOUNTER — HOSPITAL ENCOUNTER (OUTPATIENT)
Age: 71
Discharge: HOME | End: 2021-10-08
Payer: OTHER GOVERNMENT

## 2021-10-08 DIAGNOSIS — J90 PLEURAL EFFUSION: ICD-10-CM

## 2021-10-08 PROCEDURE — 71046 X-RAY EXAM CHEST 2 VIEWS: CPT

## 2021-10-14 ENCOUNTER — HOSPITAL ENCOUNTER (OUTPATIENT)
Dept: ULTRASOUND IMAGING | Age: 71
Discharge: HOME | End: 2021-10-14
Payer: OTHER GOVERNMENT

## 2021-10-14 DIAGNOSIS — R82.998 OTHER ABNORMAL FINDINGS IN URINE: ICD-10-CM

## 2021-10-14 PROCEDURE — 76770 US EXAM ABDO BACK WALL COMP: CPT

## 2021-11-01 ENCOUNTER — NURSE ONLY (OUTPATIENT)
Dept: LAB | Age: 71
End: 2021-11-01

## 2021-11-01 LAB
ALBUMIN SERPL-MCNC: 4.7 G/DL (ref 3.5–5.1)
ALP BLD-CCNC: 75 U/L (ref 38–126)
ALT SERPL-CCNC: 40 U/L (ref 11–66)
AST SERPL-CCNC: 28 U/L (ref 5–40)
BILIRUB SERPL-MCNC: 1.1 MG/DL (ref 0.3–1.2)
BILIRUBIN DIRECT: 0.3 MG/DL (ref 0–0.3)
TOTAL PROTEIN: 7.2 G/DL (ref 6.1–8)

## 2021-11-19 ENCOUNTER — OFFICE VISIT (OUTPATIENT)
Dept: UROLOGY | Age: 71
End: 2021-11-19
Payer: OTHER GOVERNMENT

## 2021-11-19 ENCOUNTER — NURSE ONLY (OUTPATIENT)
Dept: LAB | Age: 71
End: 2021-11-19

## 2021-11-19 VITALS
BODY MASS INDEX: 39.22 KG/M2 | WEIGHT: 274 LBS | SYSTOLIC BLOOD PRESSURE: 128 MMHG | DIASTOLIC BLOOD PRESSURE: 80 MMHG | HEIGHT: 70 IN

## 2021-11-19 DIAGNOSIS — N40.1 BPH WITH OBSTRUCTION/LOWER URINARY TRACT SYMPTOMS: Primary | ICD-10-CM

## 2021-11-19 DIAGNOSIS — N13.8 BPH WITH OBSTRUCTION/LOWER URINARY TRACT SYMPTOMS: Primary | ICD-10-CM

## 2021-11-19 LAB
ANION GAP SERPL CALCULATED.3IONS-SCNC: 13 MEQ/L (ref 8–16)
BILIRUBIN URINE: NEGATIVE
BLOOD URINE, POC: NORMAL
BUN BLDV-MCNC: 15 MG/DL (ref 7–22)
CALCIUM SERPL-MCNC: 9.8 MG/DL (ref 8.5–10.5)
CHARACTER, URINE: CLEAR
CHLORIDE BLD-SCNC: 102 MEQ/L (ref 98–111)
CO2: 23 MEQ/L (ref 23–33)
COLOR, URINE: YELLOW
CREAT SERPL-MCNC: 0.8 MG/DL (ref 0.4–1.2)
ERYTHROCYTE [DISTWIDTH] IN BLOOD BY AUTOMATED COUNT: 12.6 % (ref 11.5–14.5)
ERYTHROCYTE [DISTWIDTH] IN BLOOD BY AUTOMATED COUNT: 41.2 FL (ref 35–45)
FERRITIN: 213 NG/ML (ref 22–322)
GFR SERPL CREATININE-BSD FRML MDRD: > 90 ML/MIN/1.73M2
GLUCOSE BLD-MCNC: 133 MG/DL (ref 70–108)
GLUCOSE URINE: NEGATIVE MG/DL
HCT VFR BLD CALC: 42.4 % (ref 42–52)
HEMOGLOBIN: 14.7 GM/DL (ref 14–18)
INR BLD: 1.01 (ref 0.85–1.13)
IRON: 107 UG/DL (ref 65–195)
KETONES, URINE: NEGATIVE
LEUKOCYTE CLUMPS, URINE: NEGATIVE
MCH RBC QN AUTO: 31.1 PG (ref 26–33)
MCHC RBC AUTO-ENTMCNC: 34.7 GM/DL (ref 32.2–35.5)
MCV RBC AUTO: 89.6 FL (ref 80–94)
NITRITE, URINE: NEGATIVE
PH, URINE: 6 (ref 5–9)
PLATELET # BLD: 149 THOU/MM3 (ref 130–400)
PMV BLD AUTO: 9.8 FL (ref 9.4–12.4)
POST VOID RESIDUAL (PVR): 67 ML
POTASSIUM SERPL-SCNC: 4.5 MEQ/L (ref 3.5–5.2)
PROTEIN, URINE: NEGATIVE MG/DL
RBC # BLD: 4.73 MILL/MM3 (ref 4.7–6.1)
SODIUM BLD-SCNC: 138 MEQ/L (ref 135–145)
SPECIFIC GRAVITY, URINE: 1.01 (ref 1–1.03)
TOTAL IRON BINDING CAPACITY: 302 UG/DL (ref 171–450)
UROBILINOGEN, URINE: 1 EU/DL (ref 0–1)
WBC # BLD: 5.4 THOU/MM3 (ref 4.8–10.8)

## 2021-11-19 PROCEDURE — 99204 OFFICE O/P NEW MOD 45 MIN: CPT | Performed by: UROLOGY

## 2021-11-19 PROCEDURE — 51798 US URINE CAPACITY MEASURE: CPT | Performed by: UROLOGY

## 2021-11-19 PROCEDURE — 81003 URINALYSIS AUTO W/O SCOPE: CPT | Performed by: UROLOGY

## 2021-11-19 RX ORDER — CLOPIDOGREL BISULFATE 75 MG/1
75 TABLET ORAL DAILY
COMMUNITY

## 2021-11-19 RX ORDER — FINASTERIDE 5 MG/1
5 TABLET, FILM COATED ORAL DAILY
Qty: 90 TABLET | Refills: 4 | Status: ON HOLD | OUTPATIENT
Start: 2021-11-19 | End: 2022-05-16

## 2021-11-19 RX ORDER — NITROGLYCERIN 0.4 MG/1
0.4 TABLET SUBLINGUAL EVERY 5 MIN PRN
COMMUNITY

## 2021-11-19 RX ORDER — PANTOPRAZOLE SODIUM 40 MG/1
40 GRANULE, DELAYED RELEASE ORAL
COMMUNITY

## 2021-11-19 RX ORDER — ASPIRIN 81 MG/1
81 TABLET ORAL DAILY
COMMUNITY

## 2021-11-19 NOTE — PROGRESS NOTES
Right 2008    HYDROCELE EXCISION Right 1998   [place] Right 1992    KNEE SURGERY  2016    right knee    VENA CAVA FILTER PLACEMENT  10/2003    cecilio filter     Family History   Problem Relation Age of Onset    Cancer Mother     High Blood Pressure Mother     Kidney Disease Mother     Heart Disease Father     Heart Disease Maternal Uncle      Outpatient Medications Marked as Taking for the 21 encounter (Office Visit) with Krista Marion MD   Medication Sig Dispense Refill    tiotropium (SPIRIVA RESPIMAT) 2.5 MCG/ACT AERS inhaler Inhale 2 puffs into the lungs daily      metFORMIN (GLUCOPHAGE) 1000 MG tablet Take 1,000 mg by mouth 2 times daily (with meals)      clopidogrel (PLAVIX) 75 MG tablet Take 75 mg by mouth daily      pantoprazole sodium (PROTONIX) 40 MG PACK packet Take 40 mg by mouth every morning (before breakfast)      aspirin 81 MG EC tablet Take 81 mg by mouth daily      nitroGLYCERIN (NITROSTAT) 0.4 MG SL tablet Place 0.4 mg under the tongue every 5 minutes as needed for Chest pain up to max of 3 total doses. If no relief after 1 dose, call 911.  albuterol-ipratropium (COMBIVENT RESPIMAT)  MCG/ACT AERS inhaler Inhale 1 puff into the lungs 4 times daily      losartan (COZAAR) 100 MG tablet Take 100 mg by mouth daily.  simvastatin (ZOCOR) 40 MG tablet Take 40 mg by mouth nightly.  Multiple Vitamins-Minerals (MULTIVITAMIN PO) Take  by mouth daily.  Omega-3 Fatty Acids (FISH OIL PO) Take  by mouth daily.  Glucosamine-Chondroitin (GLUCOSAMINE CHONDR COMPLEX PO) Take 1 tablet by mouth 2 times daily          Patient has no known allergies.   Social History     Tobacco Use   Smoking Status Former Smoker    Quit date: 1980    Years since quittin.9   Smokeless Tobacco Never Used       Social History     Substance and Sexual Activity   Alcohol Use No       REVIEW OF SYSTEMS:  Constitutional: negative  Eyes: negative  Respiratory: negative  Cardiovascular: negative  Gastrointestinal: negative  Musculoskeletal: negative  Genitourinary: negative  Skin: negative   Neurological: negative  Hematological/Lymphatic: negative  Psychological: negative    Physical Exam:    This a 66 y.o. male   Vitals:    11/19/21 1015   BP: 128/80     Constitutional: Patient in no acute distress   Neuro: alert and oriented to person place and time. Psych: Mood and affect normal.  Head: atraumatic normocephalic  Eyes: EOMi  HEENT: neck supple, trachea midline  Lungs: Respiratory effort normal  Cardiovascular:  Normal peripheral pulses  Abdomen: Soft, non-tender, non-distended, No CVA  Bladder: non-tender and not distended. FROMx4, no cyanosis clubbing edema  Skin: warm and dry      Assessment and Plan      1. BPH with obstruction/lower urinary tract symptoms           Plan:      No follow-ups on file. Cystoscopy  Does not tolerate flomax. Will start Finasteride for bladder outlet obstruction.

## 2021-12-07 ENCOUNTER — PATIENT MESSAGE (OUTPATIENT)
Dept: UROLOGY | Age: 71
End: 2021-12-07

## 2021-12-07 NOTE — TELEPHONE ENCOUNTER
Please se my chart message and advise. Thank you. Patient seen on 11/19/2021 and started on finasteride.

## 2021-12-07 NOTE — TELEPHONE ENCOUNTER
Diarrhea is not listed as a side effect of Finasteride    He needs to contact primary to help determine the cause of his uncontrollable diarhea    Last OV states he stopped Flomax due to SOB, not diarrhea

## 2021-12-07 NOTE — TELEPHONE ENCOUNTER
Message sent to Dr Flor Fernandes via perfect serve. Received message from Dr Flor Fernandes stating Sounds good. Just have him make an appointment to discuss options. Message sent to patient via my chart.

## 2021-12-16 ENCOUNTER — TELEPHONE (OUTPATIENT)
Dept: PULMONOLOGY | Age: 71
End: 2021-12-16

## 2021-12-16 ENCOUNTER — OFFICE VISIT (OUTPATIENT)
Dept: PULMONOLOGY | Age: 71
End: 2021-12-16
Payer: OTHER GOVERNMENT

## 2021-12-16 VITALS
BODY MASS INDEX: 42.5 KG/M2 | DIASTOLIC BLOOD PRESSURE: 80 MMHG | OXYGEN SATURATION: 96 % | TEMPERATURE: 97.4 F | SYSTOLIC BLOOD PRESSURE: 128 MMHG | WEIGHT: 280.4 LBS | HEART RATE: 66 BPM | HEIGHT: 68 IN

## 2021-12-16 DIAGNOSIS — Z87.891 PERSONAL HISTORY OF SMOKING: ICD-10-CM

## 2021-12-16 DIAGNOSIS — J92.9 PLEURAL PLAQUE: ICD-10-CM

## 2021-12-16 DIAGNOSIS — Z91.89 AT RISK FOR OBSTRUCTIVE SLEEP APNEA: ICD-10-CM

## 2021-12-16 DIAGNOSIS — J44.9 CHRONIC OBSTRUCTIVE PULMONARY DISEASE, UNSPECIFIED COPD TYPE (HCC): ICD-10-CM

## 2021-12-16 DIAGNOSIS — E66.01 OBESITY, MORBID, BMI 40.0-49.9 (HCC): ICD-10-CM

## 2021-12-16 DIAGNOSIS — J44.9 STAGE 2 MODERATE COPD BY GOLD CLASSIFICATION (HCC): Primary | ICD-10-CM

## 2021-12-16 PROCEDURE — 99205 OFFICE O/P NEW HI 60 MIN: CPT | Performed by: INTERNAL MEDICINE

## 2021-12-16 RX ORDER — BUDESONIDE AND FORMOTEROL FUMARATE DIHYDRATE 160; 4.5 UG/1; UG/1
2 AEROSOL RESPIRATORY (INHALATION) 2 TIMES DAILY
Qty: 18 G | Refills: 1 | Status: SHIPPED | OUTPATIENT
Start: 2021-12-16 | End: 2021-12-21

## 2021-12-16 ASSESSMENT — ENCOUNTER SYMPTOMS
APNEA: 0
COUGH: 0
CHOKING: 0
ALLERGIC/IMMUNOLOGIC NEGATIVE: 1
CHEST TIGHTNESS: 0
EYES NEGATIVE: 1
GASTROINTESTINAL NEGATIVE: 1
SHORTNESS OF BREATH: 1
STRIDOR: 0
BACK PAIN: 1
WHEEZING: 0

## 2021-12-16 NOTE — TELEPHONE ENCOUNTER
Symbicort is not on formulary with the [place]. Patient must use [facility].   If okay to switch please send in a new prescription

## 2021-12-16 NOTE — PROGRESS NOTES
blood clots     Hyperlipidemia     Hypertension     PE (pulmonary embolism)     Pneumonia     pneumonia in      Past Surgical History:   Procedure Laterality Date    CARDIAC SURGERY      CABG X5    COLONOSCOPY      CORON[facility]RAFT  [facility]    [facility]    HEEL SPUR SURGERY Left 2007    HEEL SPUR SURGERY Right 2008    HYDROCELE EXCISION Right 1998    INGUINAL HERNIA REPAIR Right 1992    KNEE SURGERY  2016    right knee    VENA CAVA FILTER PLACEMENT  10/2003    [facility]cilio filter     Social History     Tobacco Use    Smoking status: Former Smoker     Quit date: 1980     Years since quittin.9    Smokeless tobacco: Never Used   Substance Use Topics    Alcohol use: No    Drug use: No      No Known Allergies   Family History   Problem Relation Age of Onset    Cancer Mother     High Blood Pressure Mother     Kidney Disease Mother     Heart Disease Father     Heart Disease Maternal Uncle      Current Outpatient Medications   Medication Sig Dispense Refill    tiotropium (SPIRIVA RESPIMAT) 2.5 MCG/ACT AERS inhaler Inhale 2 puffs into the lungs daily      metFORMIN (GLUCOPHAGE) 1000 MG tablet Take 1,000 mg by mouth 2 times daily (with meals)      clopidogrel (PLAVIX) 75 MG tablet Take 75 mg by mouth daily      pantoprazole sodium (PROTONIX) 40 MG PACK packet Take 40 mg by mouth every morning (before breakfast)      aspirin 81 MG EC tablet Take 81 mg by mouth daily      nitroGLYCERIN (NITROSTAT) 0.4 MG SL tablet Place 0.4 mg under the tongue every 5 minutes as needed for Chest pain up to max of 3 total doses. If no relief after 1 dose, call 911.  finasteride (PROSCAR) 5 MG tablet Take 1 tablet by mouth daily 90 tablet 4    albuterol-ipratropium (COMBIVENT RESPIMAT)  MCG/ACT AERS inhaler Inhale 1 puff into the lungs 4 times daily      losartan (COZAAR) 100 MG tablet Take 100 mg by mouth daily.  simvastatin (ZOCOR) 40 MG tablet Take 40 mg by mouth nightly.  Multiple Vitamins-Minerals (MULTIVITAMIN PO) Take  by mouth daily.  Omega-3 Fatty Acids (FISH OIL PO) Take  by mouth daily.  Glucosamine-Chondroitin (GLUCOSAMINE CHONDR COMPLEX PO) Take 1 tablet by mouth 2 times daily        No current facility-administered medications for this visit. LABS - none    There were no vitals taken for this visit. Wt Readings from Last 3 Encounters:   11/19/21 274 lb (124.3 kg)   08/08/18 284 lb 4.8 oz (129 kg)   02/25/17 295 lb (133.8 kg)     Neck Circumference - 19.75 in; Mallampati Score - 1                Objective:   Physical Exam  Vitals and nursing note reviewed. Constitutional:       General: He is not in acute distress. Appearance: He is well-developed. He is obese. He is not diaphoretic. HENT:      Head: Normocephalic and atraumatic. Right Ear: External ear normal.      Left Ear: External ear normal.      Nose: Nose normal.   Eyes:      General: No scleral icterus. Pupils: Pupils are equal, round, and reactive to light. Neck:      Thyroid: No thyromegaly. Vascular: No JVD. Trachea: No tracheal deviation. Cardiovascular:      Rate and Rhythm: Normal rate and regular rhythm. Heart sounds: Normal heart sounds. No murmur heard. No friction rub. No gallop. Pulmonary:      Effort: Pulmonary effort is normal. No respiratory distress. Breath sounds: Normal breath sounds. No stridor. No wheezing or rales. Chest:      Chest wall: No tenderness. Abdominal:      General: Bowel sounds are normal. There is no distension. Palpations: Abdomen is soft. There is no mass. Tenderness: There is no abdominal tenderness. There is no guarding or rebound. Musculoskeletal:         General: No tenderness. Normal range of motion. Cervical back: Normal range of motion and neck supple. Lymphadenopathy:      Cervical: No cervical adenopathy. Skin:     General: Skin is warm and dry. Coloration: Skin is not pale. Findings: No erythema or rash. Neurological:      Mental Status: He is alert and oriented to person, place, and time. Cranial Nerves: No cranial nerve deficit. Coordination: Coordination normal.   Psychiatric:         Thought Content: Thought content normal.       Six Minute Walk Test  Meghan Mohan 1950    Six minute walk test done in my office today by my medical assistant. Jon's oxygen saturation at rest on room air was 96%. His oxygen saturation dropped to 95% on room air with exertion after walking 864 feet and within 6 minutes. Resting Dyspnea/Fabio score was 0  and 2  upon completion of the walk. Resting heart rate was  61 bpm and 95 bpm upon completing the walk. CT chest:  FINDINGS:   Thyroid gland is unremarkable. Thoracic aorta is normal caliber. Adequate opacification of the pulmonary artery. No acute pulmonary artery embolism. Pleural calcifications suggesting prior infection or asbestos exposure. Small left pleural effusion which may be loculated or chronic. There is adjacent consolidation or atelectasis/scarring. No cardiomegaly. Atherosclerotic changes and coronary artery calcifications. No pericardial effusion. Median sternotomy. No acute osseous findings. Upper abdominal images demonstrate gallstones. Fatty infiltration of the liver.               Impression       No acute pulmonary artery embolism. Small left pleural effusion which may be loculated and adjacent pleural calcifications. Correlate for prior infection or sepsis related lung disease. This is similar in appearance to prior study. Gallstones.                       **This report has been created using voice recognition software. It may contain minor errors which are inherent in voice recognition technology. **       Final report electronically signed by Dr. Pablo Hilton on 8/6/2018 12:56 PM         Assessment:       Diagnosis Orders   1.  Stage 2 moderate COPD by GOLD classification ([facility]  6 Minute Walk Test   2. Pleural plaque  CT CHEST WO CONTRAST   3. Obesity, morbid, BMI 40.0-49.9 (HCC)  CT CHEST WO CONTRAST   4. Personal history of smoking     5. At risk for obstructive sleep apnea       Pleural plaques from either old infectious process (empyema) trauma chest wall (during thoracotomy) or asbestoses exposure.         Plan:      Orders Placed This Encounter   Procedures    CT CHEST WO CONTRAST     Standing Status:   Future     Standing Expiration Date:   12/16/2022     Order Specific Question:   Reason for exam:     Answer:   Lung Nodules    6 Minute Walk Test     Standing Status:   Future     Standing Expiration Date:   12/16/2022      Orders Placed This Encounter   Medications    budesonide-formoterol (SYMBICORT) 160-4.5 MCG/ACT AERO     Sig: Inhale 2 puffs into the lungs 2 times daily     Dispense:  18 g     Refill:  1      Continue Tiotropium/Albuterol

## 2021-12-27 ENCOUNTER — PROCEDURE VISIT (OUTPATIENT)
Dept: UROLOGY | Age: 71
End: 2021-12-27
Payer: OTHER GOVERNMENT

## 2021-12-27 DIAGNOSIS — N13.8 BPH WITH OBSTRUCTION/LOWER URINARY TRACT SYMPTOMS: Primary | ICD-10-CM

## 2021-12-27 DIAGNOSIS — N40.1 BPH WITH OBSTRUCTION/LOWER URINARY TRACT SYMPTOMS: Primary | ICD-10-CM

## 2021-12-27 LAB
BILIRUBIN URINE: NEGATIVE
BLOOD URINE, POC: ABNORMAL
CHARACTER, URINE: CLEAR
COLOR, URINE: YELLOW
GLUCOSE URINE: 250 MG/DL
KETONES, URINE: NEGATIVE
LEUKOCYTE CLUMPS, URINE: NEGATIVE
NITRITE, URINE: NEGATIVE
PH, URINE: 6.5 (ref 5–9)
PROTEIN, URINE: NEGATIVE MG/DL
SPECIFIC GRAVITY, URINE: 1.02 (ref 1–1.03)
UROBILINOGEN, URINE: 0.2 EU/DL (ref 0–1)

## 2021-12-27 PROCEDURE — 52000 CYSTOURETHROSCOPY: CPT | Performed by: UROLOGY

## 2021-12-27 PROCEDURE — 81003 URINALYSIS AUTO W/O SCOPE: CPT | Performed by: UROLOGY

## 2021-12-27 NOTE — PROGRESS NOTES
Dr. Jesica Benavidez MD MD  [facility] Consultation / New Patient Visit    Patient:  Li Calvert  YOB: 1950  Date: 12/27/2021  Consult requested from DIMA Matos CNP     HISTORY OF PRESENT ILLNESS:   The patient is a 66 y.o. male who presents today for follow-up for the following problem(s): BPH with LUTs  Overall the problem(s) : are worsening. Associated Symptoms: No dysuria, gross hematuria. Pain Severity:      Today visit:   12/27/21   Patient presents with BPH and bothersome severe LUTs. AUASS: 20/5. He has tried Flomax in the past, which he did not tolerate due to shortness of breath. Finasteride gives him diarrhea, and he does not want to take. Symptoms are not controlled    Cystoscopy Operative Note  Surgeon: Jesica Benavidez MD   Anesthesia: Urethral 2%  Indications: BPH  Position: supine  Findings:   The patient was prepped and draped in the usual sterile fashion. The flexible cystoscope was advanced through the urethra and into the bladder. The bladder was thoroughly inspected and the following was noted:    Residual Urine: Moderate  Urethra: No abnormalities of the urethra are noted. Prostate: Medium to large gland (80 gm) Complete obstruction by lateral & small median lobe of prostate. Bladder: No tumors or CIS noted. No bladder diverticulum. Moderate  trabeculation noted. Ureters: Clear efflux from both ureters. Orifices with normal configuration and location. The cystoscope was removed. The patient tolerated the procedure well. Plan  Good candidate for Greenlight vs Urolift (would need US prior)      Summary of old records:   (Patient's old records, notes and chart reviewed and summarized above.)    Last several PSA's:  Lab Results   Component Value Date    PSA 0.73 09/22/2021       Last total testosterone:  No results found for: TESTOSTERONE    Urinalysis today:  No results found for this visit on 12/27/21.       Last BUN and (PROSCAR) 5 MG tablet Take 1 tablet by mouth daily 90 tablet 4    albuterol-ipratropium (COMBIVENT RESPIMAT)  MCG/ACT AERS inhaler Inhale 1 puff into the lungs 4 times daily      losartan (COZAAR) 100 MG tablet Take 100 mg by mouth daily.  simvastatin (ZOCOR) 40 MG tablet Take 40 mg by mouth nightly.  Multiple Vitamins-Minerals (MULTIVITAMIN PO) Take  by mouth daily.  Omega-3 Fatty Acids (FISH OIL PO) Take  by mouth daily.  Glucosamine-Chondroitin (GLUCOSAMINE CHONDR COMPLEX PO) Take 1 tablet by mouth 2 times daily          Patient has no known allergies. Social History     Tobacco Use   Smoking Status Former Smoker    Quit date: 1980    Years since quittin.0   Smokeless Tobacco Never Used       Social History     Substance and Sexual Activity   Alcohol Use No       REVIEW OF SYSTEMS:  Constitutional: negative  Eyes: negative  Respiratory: negative  Cardiovascular: negative  Gastrointestinal: negative  Musculoskeletal: negative  Genitourinary: negative  Skin: negative   Neurological: negative  Hematological/Lymphatic: negative  Psychological: negative    Physical Exam:    This a 66 y.o. male   There were no vitals filed for this visit. Constitutional: Patient in no acute distress   Neuro: alert and oriented to person place and time. Psych: Mood and affect normal.  Head: atraumatic normocephalic  Eyes: EOMi  HEENT: neck supple, trachea midline  Lungs: Respiratory effort normal  Cardiovascular:  Normal peripheral pulses  Abdomen: Soft, non-tender, non-distended, No CVA  Bladder: non-tender and not distended. FROMx4, no cyanosis clubbing edema  Skin: warm and dry      Assessment and Plan      1. BPH with obstruction/lower urinary tract symptoms           Plan:      No follow-ups on file.   Cystoscopy  Does not tolerate flomax or finasteride    Good candidate for Greenlight vs Urolift (would need US prior)

## 2022-01-10 ENCOUNTER — OFFICE VISIT (OUTPATIENT)
Dept: UROLOGY | Age: 72
End: 2022-01-10
Payer: OTHER GOVERNMENT

## 2022-01-10 VITALS
DIASTOLIC BLOOD PRESSURE: 78 MMHG | SYSTOLIC BLOOD PRESSURE: 144 MMHG | WEIGHT: 280 LBS | HEIGHT: 70 IN | BODY MASS INDEX: 40.09 KG/M2

## 2022-01-10 DIAGNOSIS — N40.1 BPH WITH OBSTRUCTION/LOWER URINARY TRACT SYMPTOMS: Primary | ICD-10-CM

## 2022-01-10 DIAGNOSIS — N13.8 BPH WITH OBSTRUCTION/LOWER URINARY TRACT SYMPTOMS: Primary | ICD-10-CM

## 2022-01-10 PROCEDURE — 99214 OFFICE O/P EST MOD 30 MIN: CPT | Performed by: UROLOGY

## 2022-01-10 NOTE — PROGRESS NOTES
today:  No results found for this visit on 01/10/22. Last BUN and creatinine:  Lab Results   Component Value Date    BUN 15 2021     Lab Results   Component Value Date    CREATININE 0.8 2021       Imaging Reviewed during this Office Visit:   (results were independently reviewed by physician and radiology report verified)    PAST MEDICAL, FAMILY AND SOCIAL HISTORY:  Past Medical History:   Diagnosis Date    Arthritis     Back, knees    CAD (coronary artery disease)     CHF (congestive heart failure) ([facility]     \"CABG X5\"    COPD (chronic obstructive pulmonary disease) ([facility]     Hx of blood clots     Hyperlipidemia     Hypertension     PE (pulmonary embolism)     Pneumonia     pneumonia in      Past Surgical History:   Procedure Laterality Date    CARDIAC SURGERY      CABG X5    COLONOSCOPY      CORONARY ARTERY BYPASS GRAFT  10/2003    [address] [place] [address]    HEEL SPUR SURGERY Left 2007    HEEL SPUR SURGERY Right 2008    HYDROCELE EXCISION Right 1998    INGUINAL HERNIA REPAIR Right 1992    KNEE SURGERY  2016    right knee    VENA CAVA FILTER PLACEMENT  10/2003    cecilio filter     Family History   Problem Relation Age of Onset    Cancer Mother     High Blood Pressure Mother     Kidney Disease Mother     Heart Disease Father     Heart Disease Maternal Uncle      No outpatient medications have been marked as taking for the 1/10/22 encounter (Office Visit) with Jonah Reyna MD.       Patient has no known allergies.   Social History     Tobacco Use   Smoking Status Former Smoker    Quit date: 1980    Years since quittin.0   Smokeless Tobacco Never Used       Social History     Substance and Sexual Activity   Alcohol Use No       REVIEW OF SYSTEMS:  Constitutional: negative  Eyes: negative  Respiratory: negative  Cardiovascular: negative  Gastrointestinal: negative  Musculoskeletal: negative  Genitourinary: negative  Skin: negative   Neurological: negative  Hematological/Lymphatic: negative  Psychological: negative    Physical Exam:    This a 66 y.o. male   Vitals:    01/10/22 1129   BP: (!) 144/78     Constitutional: Patient in no acute distress   Neuro: alert and oriented to person place and time. Psych: Mood and affect normal.  Head: atraumatic normocephalic  Eyes: EOMi  HEENT: neck supple, trachea midline  Lungs: Respiratory effort normal  Cardiovascular:  Normal peripheral pulses  Abdomen: Soft, non-tender, non-distended, No CVA  Bladder: non-tender and not distended. FROMx4, no cyanosis clubbing edema  Skin: warm and dry      Assessment and Plan      1. BPH with obstruction/lower urinary tract symptoms           Plan:      No follow-ups on file. Cystoscopy  Does not tolerate flomax or finasteride    Greenlight PVP - will need cardiac clearance  Risks benefits and alternative procedures are explained, informed consent is obtained, and the patient elects to proceed.

## 2022-03-14 ENCOUNTER — HOSPITAL ENCOUNTER (OUTPATIENT)
Dept: CT IMAGING | Age: 72
Discharge: HOME | End: 2022-03-14
Payer: OTHER GOVERNMENT

## 2022-03-14 DIAGNOSIS — E66.01 OBESITY, MORBID, BMI 40.0-49.9 (HCC): ICD-10-CM

## 2022-03-14 DIAGNOSIS — J92.9 PLEURAL PLAQUE: ICD-10-CM

## 2022-03-14 PROCEDURE — 71250 CT THORAX DX C-: CPT

## 2022-03-16 ENCOUNTER — OFFICE VISIT (OUTPATIENT)
Dept: PULMONOLOGY | Age: 72
End: 2022-03-16
Payer: OTHER GOVERNMENT

## 2022-03-16 VITALS
SYSTOLIC BLOOD PRESSURE: 124 MMHG | BODY MASS INDEX: 39.17 KG/M2 | TEMPERATURE: 98 F | HEART RATE: 67 BPM | WEIGHT: 273.6 LBS | OXYGEN SATURATION: 96 % | DIASTOLIC BLOOD PRESSURE: 86 MMHG | HEIGHT: 70 IN

## 2022-03-16 DIAGNOSIS — J44.9 CHRONIC OBSTRUCTIVE PULMONARY DISEASE, UNSPECIFIED COPD TYPE (HCC): Primary | ICD-10-CM

## 2022-03-16 DIAGNOSIS — J94.9 PLEURAL CONDITION: ICD-10-CM

## 2022-03-16 DIAGNOSIS — J92.9 PLEURAL PLAQUE: ICD-10-CM

## 2022-03-16 DIAGNOSIS — Z87.891 PERSONAL HISTORY OF SMOKING: ICD-10-CM

## 2022-03-16 PROCEDURE — 99213 OFFICE O/P EST LOW 20 MIN: CPT | Performed by: INTERNAL MEDICINE

## 2022-03-16 RX ORDER — AMLODIPINE BESYLATE 10 MG/1
10 TABLET ORAL DAILY
COMMUNITY

## 2022-03-16 ASSESSMENT — ENCOUNTER SYMPTOMS
STRIDOR: 0
GASTROINTESTINAL NEGATIVE: 1
APNEA: 0
COUGH: 0
SHORTNESS OF BREATH: 1
BACK PAIN: 1
EYES NEGATIVE: 1
CHEST TIGHTNESS: 0
WHEEZING: 0
CHOKING: 0
ALLERGIC/IMMUNOLOGIC NEGATIVE: 1

## 2022-03-16 NOTE — PROGRESS NOTES
Neck Circumference -   19.75  Mallampati - 1    Lung Nodule Screening     [] Qualifies    [x] Does not qualify   [] Declined    [] Completed

## 2022-03-16 NOTE — PROGRESS NOTES
Subjective:      Patient ID: Michele Drake is a 66 y.o. male. CC:COPD. HPI       Comes back to evaluate therapeutic intervention and to review CT chest.  He feels better less cough and less SOB but interestingly he believes is due to the warmer weather and not to the recently started Spiriva. The CT chest reveals a stable area of pleural plaque, calcification and pleural effusion which originally occurred during thoracotomy of OHS. Not coughing, stable SOB, exercises often, swims at the , no CP, sputum production, no fever, no wt loss. Previous notes  Well established diagnosis of COPD, follows at the [place], his pulmonologist relocated and [place]  would like to see somebody local.  COPD GOLD 2 on Spiriva and Albuterol. Had recent PCI with stents and started on Brilinta which caused severe SOB, brilinta was discontinued and petra improved but does not believe his SOB went back to baseline. Open heart surgery in 4570 complicated with pulmonary embolism, pneumonia, \"lung collapse\" required mechanical ventilation support and ICU stay  Swims  3 times a week, 1 mile/d  Started smoking at age 14 and quit at age 27 up to 2 ppd. Mother got laryngeal  cancer and decided to quit. 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   PFTs, CXR and old CT chest available for review      Review of Systems   Constitutional: Negative. HENT: Negative. Eyes: Negative. Respiratory: Positive for shortness of breath. Negative for apnea, cough, choking, chest tightness, wheezing and stridor. Cardiovascular: Negative. Gastrointestinal: Negative. Endocrine: Negative.     Musculoskeletal: Positive for arthralgias, back pain and gait problem. Allergic/Immunologic: Negative. Hematological: Negative. Psychiatric/Behavioral: Negative.            All other systems reviewed and are negative    Lung Nodule Screening     [] Qualifies    [x] Does not qualify   [] Declined   [] Completed  Past Medical History:   Diagnosis Date    Arthritis     Back, knees    CAD (coronary artery disease)     CHF (congestive heart failure) ([facility]     \"CABG X5\"    COPD (chronic obstructive pulmonary disease) ([facility]     Hx of blood clots     Hyperlipidemia     Hypertension     PE (pulmonary embolism)     Pneumonia     pneumonia in      Past Surgical History:   Procedure Laterality Date    CARDIAC SURGERY      CABG X5    COLONOSCOPY      CORONARY ARTERY BYPASS GRAFT  10/2003    [address] [place] [address]    HEEL SPUR SURGERY Left 2007    HEEL SPUR SURGERY Right 2008    HYDROCELE EXCISION Right 1998    INGUINAL HERNIA REPAIR Right 1992    KNEE SURGERY  2016    right knee    VENA CAVA FILTER PLACEMENT  10/2003    [facility] filter     Social History     Tobacco Use    Smoking status: Former Smoker     Quit date: 1980     Years since quittin.2    Smokeless tobacco: Never Used   Substance Use Topics    Alcohol use: No    Drug use: No      No Known Allergies   Family History   Problem Relation Age of Onset    Cancer Mother     High Blood Pressure Mother     Kidney Disease Mother     Heart Disease Father     Heart Disease Maternal Uncle      Current Outpatient Medications   Medication Sig Dispense Refill    amLODIPine (NORVASC) 10 MG tablet Take 10 mg by mouth daily      fluticasone-salmeterol (WIXELA INHUB) 250-50 MCG/DOSE AEPB Inhale 1 puff into the lungs every 12 hours 60 each 5    tiotropium (SPIRIVA RESPIMAT) 2.5 MCG/ACT AERS inhaler Inhale 2 puffs into the lungs daily      metFORMIN (GLUCOPHAGE) 1000 MG tablet Take 1,000 mg by mouth 2 times daily (with meals)      clopidogrel (PLAVIX) 75 MG tablet Take 75 mg by mouth daily      No tracheal deviation. Cardiovascular:      Rate and Rhythm: Normal rate and regular rhythm. Heart sounds: Normal heart sounds. No murmur heard. No friction rub. No gallop. Pulmonary:      Effort: Pulmonary effort is normal. No respiratory distress. Breath sounds: Normal breath sounds. No stridor. No wheezing or rales. Chest:      Chest wall: No tenderness. Abdominal:      General: Bowel sounds are normal. There is no distension. Palpations: Abdomen is soft. There is no mass. Tenderness: There is no abdominal tenderness. There is no guarding or rebound. Musculoskeletal:         General: No tenderness. Normal range of motion. Cervical back: Normal range of motion and neck supple. Lymphadenopathy:      Cervical: No cervical adenopathy. Skin:     General: Skin is warm and dry. Coloration: Skin is not pale. Findings: No erythema or rash. Neurological:      Mental Status: He is alert and oriented to person, place, and time. Cranial Nerves: No cranial nerve deficit. Coordination: Coordination normal.   Psychiatric:         Thought Content: Thought content normal.             CT chest:  FINDINGS:   Thyroid gland is unremarkable. Thoracic aorta is normal caliber. Adequate opacification of the pulmonary artery. No acute pulmonary artery embolism. Pleural calcifications suggesting prior infection or asbestos exposure. Small left pleural effusion which may be loculated or chronic. There is adjacent consolidation or atelectasis/scarring. No cardiomegaly. Atherosclerotic changes and coronary artery calcifications. No pericardial effusion. Median sternotomy. No acute osseous findings. Assessment:      Pleural plaques from either old infectious process (empyema) trauma chest wall (during thoracotomy) or asbestoses exposure(doubt)   Diagnosis Orders   1. Chronic obstructive pulmonary disease, unspecified COPD type ([facility]     2. Pleural plaque     3. Personal history of smoking             Plan:       Stop Spiriva  Continue Albuterol PRN  RTC 12 mos

## 2022-03-29 ENCOUNTER — TELEPHONE (OUTPATIENT)
Dept: UROLOGY | Age: 72
End: 2022-03-29

## 2022-03-31 ENCOUNTER — TELEPHONE (OUTPATIENT)
Dept: UROLOGY | Age: 72
End: 2022-03-31

## 2022-03-31 DIAGNOSIS — Z01.818 PRE-OP TESTING: ICD-10-CM

## 2022-03-31 DIAGNOSIS — N40.1 BPH WITH OBSTRUCTION/LOWER URINARY TRACT SYMPTOMS: Primary | ICD-10-CM

## 2022-03-31 DIAGNOSIS — N13.8 BPH WITH OBSTRUCTION/LOWER URINARY TRACT SYMPTOMS: Primary | ICD-10-CM

## 2022-03-31 NOTE — TELEPHONE ENCOUNTER
DO NOT TAKE ASPIRIN,  FISH OIL, COUMADIN, IBUPROFEN, MOTRIN-LIKE DRUGS AND ANY MULTIVITAMINS OR OVER THE COUNTER SUPPLEMENTS 14 DAYS PRIOR TO SURGERY. HOLD PLAVIX FOR 5 DAYS PRIOR TO SURGERY. MUST HAVE AN ADULT OVER THE AGE OF 18 WITH YOU AT THE TIME OF THE PROCEDURE AND WITH YOU AT HOME AFTER THE PROCEDURE FOR 24 HOURS       Flavia Alaniz 1950 Diagnosis:     Surgical Physician: Dr. Iglesia Wiseman have been scheduled for the procedure marked below:      Surgery: Cystoscopy, Greenlight Photo Vaporization of Prostate         Date: 5/16/2022     Anesthesia: Anesthesiologist (General/Spinal)     Place of Service: [facility] Second Floor Same Day Surgery         Arrive to same day surgery by:  9:00 am  (Surgery time is subject to change)      INSTRUCTIONS AS MARKED BELOW:    1.  DO NOT eat or drink anything after midnight before surgery. 2.  We prefer you shower or bathe with an antibacterial soap (Dial) the morning of surgery. 3.  Please ensure to have a  with you to transport you home. 4.  Please bring a current medication list, photo ID and insurance card(s) with you  5. Okay to take Tylenol  6. If you take Glucophage, Metformin or Janumet, hold 48-hours prior to surgery  7. Take blood pressure or heart medication as directed, if taken in the morning take with a small sip of water  8. The office will call you in 1-2 days after your procedure to schedule a follow up. DATE SENSITIVE TESTING-DO ON THE DATE LISTED*WALK IN *NO APPOINTMENT    DO EKG NOW. ORDERS INCLUDED  DO URINE CULTURE AND FASTING LABS ON 5/2/2022. ORDERS INCLUDED.         Date: 3/31/2022

## 2022-03-31 NOTE — TELEPHONE ENCOUNTER
Patient is scheduled with Dr. Courtney Reilly on 5/16/2022. Surgery consent to be done upon arrival.  Dr. Chakraborty Render to clear. Patient to do EKG now; to do urine culture and fasting labs on 5/2/22. Surgery instructions mailed to patient.     Royce notified DLXU#[id number] per Marquis Davis

## 2022-03-31 NOTE — TELEPHONE ENCOUNTER
CARDIAC CLEARANCE FORM    Clearance From  Dr. Morales Wimbledon     Appointment Date    Time       Mason Members  1950  Surgeon:  Dr. Jonny Quiñones.    Procedure:  Cystoscopy, Greenlight Photovaporization of Prostate    Date:  5/16/22  Facility: [facility]    REID.  MEDICAL HISTORY  DM CAD PVD CVA DVT/PE MI CHFMalignant Hyperthemia HTN Tobacco/ETOH Sleep Apnea GERD Hyperlipidemia Renal Insufficiency COPD/Asthma Bleeding Disorder Pacemaker/AICD  II. CURRENT MEDICATIONS: Attach list or complete     Pt is on following meds that need special instructions for surgery:  Anticoagulants Heart Meds ASA Insulin Oral anti-diabetics NSAIDS Diuretics     K replacements  III. ALLERGIES:   IV.  FUNCTIONAL CAPACITY  >4 METS (CAN VACUUM/HOUSEWORK,CLIMB FLIGHT STAIRS WITHOUT DYSPNEA)  <4METS (FLIGHT OF STEPS CAUSES DYSPNEA/CARDIAC SYMPTOMS)   Stress Test Recommended:    Stress tests or Cardiac Cath in last 5 years:  Yes (attach report)  No   Results: WNL   ABN  Any Change in Cardiac symptoms: Yes  NO  Comments:    Revascularization in last 5 years: Yes  NO  CABG: Yes  No   Comments:     Stents:  Date: ________  Any change in cardiac symptoms  Yes  NO  Comments:   V.  REVIEW OF SYSTEMS:  (Pertinent positive or negative)    VI. PHYSICIAL EXAM  HEENT:1.  Dentations   Good Poor          HT:_______ WT:______      2. Neck Pathology: Rheumatoid DDD C-spine  BP:______ P:________  PULMONARY:  CARDIAC  ABDOMEN  EXTREMITIES  OTHER  VII.   Testing Ordered by Surgeon  Reviewed by Clearance Physician  Test      Result  Plan,if Abnormal  ___CBS     WNL  ABN____________________  ___BMP/BUN/CR    WNL  ABN____________________  ___K+      WNL  ABN____________________  ___UA      WNL  ABN____________________  ___CXR     WNL  ABN____________________  ___EKG     WNL  ABN____________________  ___MRSA     WNL  ABN____________________  VIII.  ___Acceptable risk for surgery ___Risk Unacceptable-Communication to Follow Comments:_____________________________________________________  ________________________________________________________________________  Physician ___________________________  Date:_______________  Physician Printed Name:  _________________________    Fax  [phone]

## 2022-03-31 NOTE — TELEPHONE ENCOUNTER
SURGERY 6  [address] [place], [place] [address]e Drive [facility], [address]      Phone *[phone] *[phone]   Surgical Scheduling Direct Line Phone *[phone] Fax *[phone]      Flavia Alaniz 1950 male    [address] [address]   Marital Status:          Home Phone: [phone]      Cell Phone:    Telephone Information:   Mobile [phone]          Surgeon: Dr. Crystal Alfaro Surgery Date: 5/16/2022   Time: 11:00 am    Procedure: Cystoscopy, Greenlight Photo Vaporization of Prostate    Diagnosis: BPH with obstruction    Important Medical History:  In [facility]    Special Inst/Equip: IRA LAUGHLIN#[id number] per Lisa Heaton    CPT Codes:    42391  Latex Allergy: No     Cardiac Device:  No    Anesthesia:  General          Admission Type:  Same Day                        Admit Prior to Day of Surgery: No    Case Location:  Main OR            Preadmission Testing:  Phone Call          PAT Date and Time:______________________________________________________    PAT Confirmation #: ______________________________________________________    Post Op Visit: ___________________________________________________________    Need Preop Cardiac Clearance: Yes    Does Patient have Cardiologist/physician?      Dr. Mahnaz Ahmadi    Surgery Confirmation #: __________________________________________________    Foster Knee: ________________________   Date: __________________________     [employer] Name: [place]

## 2022-04-05 ENCOUNTER — HOSPITAL ENCOUNTER (OUTPATIENT)
Age: 72
Discharge: HOME | End: 2022-04-05
Payer: OTHER GOVERNMENT

## 2022-04-05 DIAGNOSIS — Z01.818 PRE-OP TESTING: ICD-10-CM

## 2022-04-05 DIAGNOSIS — N13.8 BPH WITH OBSTRUCTION/LOWER URINARY TRACT SYMPTOMS: ICD-10-CM

## 2022-04-05 DIAGNOSIS — N40.1 BPH WITH OBSTRUCTION/LOWER URINARY TRACT SYMPTOMS: ICD-10-CM

## 2022-04-05 LAB
EKG ATRIAL RATE: 62 BPM
EKG P AXIS: 12 DEGREES
EKG P-R INTERVAL: 194 MS
EKG Q-T INTERVAL: 402 MS
EKG QRS DURATION: 90 MS
EKG QTC CALCULATION (BAZETT): 408 MS
EKG R AXIS: -15 DEGREES
EKG T AXIS: 60 DEGREES
EKG VENTRICULAR RATE: 62 BPM

## 2022-04-05 PROCEDURE — 93010 ELECTROCARDIOGRAM REPORT: CPT | Performed by: INTERNAL MEDICINE

## 2022-04-05 PROCEDURE — 93005 ELECTROCARDIOGRAM TRACING: CPT

## 2022-04-11 ENCOUNTER — TELEPHONE (OUTPATIENT)
Dept: UROLOGY | Age: 72
End: 2022-04-11

## 2022-04-11 NOTE — TELEPHONE ENCOUNTER
Received Cardiac Clearance from Dr. Kamar Lord. Called patient to go over directions with regards to asa 81mg and Plavix(HOLD BOTH FOR 5 DAYS PRIOR); per stated on clearance form. LEFT MSG FOR PATIENT TO CALL SO WE CAN GO OVER INSTRUCTIONS.

## 2022-04-21 ENCOUNTER — PREP FOR PROCEDURE (OUTPATIENT)
Dept: UROLOGY | Age: 72
End: 2022-04-21

## 2022-04-21 RX ORDER — SODIUM CHLORIDE 9 MG/ML
INJECTION, SOLUTION INTRAVENOUS CONTINUOUS
Status: CANCELLED | OUTPATIENT
Start: 2022-05-16

## 2022-04-25 LAB
ALBUMIN SERPL-MCNC: 4.3 G/DL
ALP BLD-CCNC: 59 U/L
ALT SERPL-CCNC: 42 U/L
ANION GAP SERPL CALCULATED.3IONS-SCNC: ABNORMAL MMOL/L
AST SERPL-CCNC: 39 U/L
AVERAGE GLUCOSE: NORMAL
BILIRUB SERPL-MCNC: 3.1 MG/DL (ref 0.1–1.4)
BUN BLDV-MCNC: 15 MG/DL
CALCIUM SERPL-MCNC: 9.6 MG/DL
CHLORIDE BLD-SCNC: 104 MMOL/L
CHOLESTEROL, TOTAL: 108 MG/DL
CHOLESTEROL/HDL RATIO: ABNORMAL
CO2: 26 MMOL/L
CREAT SERPL-MCNC: 0.92 MG/DL
GFR CALCULATED: ABNORMAL
GLUCOSE BLD-MCNC: 158 MG/DL
HBA1C MFR BLD: 6.2 %
HCT VFR BLD CALC: 37.9 % (ref 41–53)
HDLC SERPL-MCNC: 30 MG/DL (ref 35–70)
HEMOGLOBIN: 13.7 G/DL (ref 13.5–17.5)
LDL CHOLESTEROL CALCULATED: 58 MG/DL (ref 0–160)
NONHDLC SERPL-MCNC: ABNORMAL MG/DL
PLATELET # BLD: 144 K/ΜL
POTASSIUM SERPL-SCNC: 4 MMOL/L
SODIUM BLD-SCNC: 141 MMOL/L
TOTAL PROTEIN: 7.5
TRIGL SERPL-MCNC: 96 MG/DL
VITAMIN D 25-HYDROXY: 29
VITAMIN D2, 25 HYDROXY: NORMAL
VITAMIN D3,25 HYDROXY: NORMAL
VLDLC SERPL CALC-MCNC: ABNORMAL MG/DL
WBC # BLD: 4.7 10^3/ML

## 2022-05-03 ENCOUNTER — NURSE ONLY (OUTPATIENT)
Dept: LAB | Age: 72
End: 2022-05-03

## 2022-05-03 DIAGNOSIS — Z01.818 PRE-OP TESTING: ICD-10-CM

## 2022-05-03 DIAGNOSIS — N40.1 BPH WITH OBSTRUCTION/LOWER URINARY TRACT SYMPTOMS: ICD-10-CM

## 2022-05-03 DIAGNOSIS — N13.8 BPH WITH OBSTRUCTION/LOWER URINARY TRACT SYMPTOMS: ICD-10-CM

## 2022-05-03 LAB
ANION GAP SERPL CALCULATED.3IONS-SCNC: 16 MEQ/L (ref 8–16)
BASOPHILS # BLD: 1.1 %
BASOPHILS ABSOLUTE: 0.1 THOU/MM3 (ref 0–0.1)
BUN BLDV-MCNC: 16 MG/DL (ref 7–22)
CALCIUM SERPL-MCNC: 9.4 MG/DL (ref 8.5–10.5)
CHLORIDE BLD-SCNC: 100 MEQ/L (ref 98–111)
CO2: 23 MEQ/L (ref 23–33)
CREAT SERPL-MCNC: 0.7 MG/DL (ref 0.4–1.2)
EOSINOPHIL # BLD: 1.5 %
EOSINOPHILS ABSOLUTE: 0.1 THOU/MM3 (ref 0–0.4)
ERYTHROCYTE [DISTWIDTH] IN BLOOD BY AUTOMATED COUNT: 12.9 % (ref 11.5–14.5)
ERYTHROCYTE [DISTWIDTH] IN BLOOD BY AUTOMATED COUNT: 41.8 FL (ref 35–45)
GFR SERPL CREATININE-BSD FRML MDRD: > 90 ML/MIN/1.73M2
GLUCOSE BLD-MCNC: 147 MG/DL (ref 70–108)
HCT VFR BLD CALC: 41.6 % (ref 42–52)
HEMOGLOBIN: 14.2 GM/DL (ref 14–18)
IMMATURE GRANS (ABS): 0.02 THOU/MM3 (ref 0–0.07)
IMMATURE GRANULOCYTES: 0.4 %
LYMPHOCYTES # BLD: 26.2 %
LYMPHOCYTES ABSOLUTE: 1.2 THOU/MM3 (ref 1–4.8)
MCH RBC QN AUTO: 30.9 PG (ref 26–33)
MCHC RBC AUTO-ENTMCNC: 34.1 GM/DL (ref 32.2–35.5)
MCV RBC AUTO: 90.4 FL (ref 80–94)
MONOCYTES # BLD: 11.2 %
MONOCYTES ABSOLUTE: 0.5 THOU/MM3 (ref 0.4–1.3)
NUCLEATED RED BLOOD CELLS: 0 /100 WBC
PLATELET # BLD: 166 THOU/MM3 (ref 130–400)
PMV BLD AUTO: 10 FL (ref 9.4–12.4)
POTASSIUM SERPL-SCNC: 4 MEQ/L (ref 3.5–5.2)
RBC # BLD: 4.6 MILL/MM3 (ref 4.7–6.1)
SEG NEUTROPHILS: 59.6 %
SEGMENTED NEUTROPHILS ABSOLUTE COUNT: 2.8 THOU/MM3 (ref 1.8–7.7)
SODIUM BLD-SCNC: 139 MEQ/L (ref 135–145)
WBC # BLD: 4.7 THOU/MM3 (ref 4.8–10.8)

## 2022-05-05 LAB
ORGANISM: ABNORMAL
URINE CULTURE, ROUTINE: ABNORMAL

## 2022-05-06 ENCOUNTER — TELEPHONE (OUTPATIENT)
Dept: UROLOGY | Age: 72
End: 2022-05-06

## 2022-05-06 NOTE — TELEPHONE ENCOUNTER
Please review urine culture on 5/3/2022. Surgery with Dr. Hernandez Zhu on 5/16/2022 for a Cysto, Greenlight PVP. Thanks.

## 2022-05-16 ENCOUNTER — ANESTHESIA EVENT (OUTPATIENT)
Dept: OPERATING ROOM | Age: 72
End: 2022-05-16
Payer: OTHER GOVERNMENT

## 2022-05-16 ENCOUNTER — HOSPITAL ENCOUNTER (OUTPATIENT)
Age: 72
Setting detail: OUTPATIENT SURGERY
Discharge: HOME | End: 2022-05-16
Attending: UROLOGY | Admitting: UROLOGY
Payer: OTHER GOVERNMENT

## 2022-05-16 ENCOUNTER — ANESTHESIA (OUTPATIENT)
Dept: OPERATING ROOM | Age: 72
End: 2022-05-16
Payer: OTHER GOVERNMENT

## 2022-05-16 VITALS
OXYGEN SATURATION: 94 % | RESPIRATION RATE: 18 BRPM | SYSTOLIC BLOOD PRESSURE: 129 MMHG | WEIGHT: 263.2 LBS | DIASTOLIC BLOOD PRESSURE: 68 MMHG | HEIGHT: 70 IN | BODY MASS INDEX: 37.68 KG/M2 | HEART RATE: 64 BPM | TEMPERATURE: 97.7 F

## 2022-05-16 LAB
GLUCOSE BLD-MCNC: 137 MG/DL (ref 70–108)
INR BLD: 1.09 (ref 0.85–1.13)

## 2022-05-16 PROCEDURE — 6360000002 HC RX W HCPCS: Performed by: UROLOGY

## 2022-05-16 PROCEDURE — 2720000010 HC SURG SUPPLY STERILE: Performed by: UROLOGY

## 2022-05-16 PROCEDURE — 3600000013 HC SURGERY LEVEL 3 ADDTL 15MIN: Performed by: UROLOGY

## 2022-05-16 PROCEDURE — 7100000000 HC PACU RECOVERY - FIRST 15 MIN: Performed by: UROLOGY

## 2022-05-16 PROCEDURE — 6360000002 HC RX W HCPCS: Performed by: NURSE ANESTHETIST, CERTIFIED REGISTERED

## 2022-05-16 PROCEDURE — 85610 PROTHROMBIN TIME: CPT

## 2022-05-16 PROCEDURE — 2580000003 HC RX 258: Performed by: UROLOGY

## 2022-05-16 PROCEDURE — 3700000001 HC ADD 15 MINUTES (ANESTHESIA): Performed by: UROLOGY

## 2022-05-16 PROCEDURE — 82948 REAGENT STRIP/BLOOD GLUCOSE: CPT

## 2022-05-16 PROCEDURE — 7100000001 HC PACU RECOVERY - ADDTL 15 MIN: Performed by: UROLOGY

## 2022-05-16 PROCEDURE — 3700000000 HC ANESTHESIA ATTENDED CARE: Performed by: UROLOGY

## 2022-05-16 PROCEDURE — 7100000010 HC PHASE II RECOVERY - FIRST 15 MIN: Performed by: UROLOGY

## 2022-05-16 PROCEDURE — 3600000003 HC SURGERY LEVEL 3 BASE: Performed by: UROLOGY

## 2022-05-16 PROCEDURE — 2500000003 HC RX 250 WO HCPCS: Performed by: NURSE ANESTHETIST, CERTIFIED REGISTERED

## 2022-05-16 PROCEDURE — 36415 COLL VENOUS BLD VENIPUNCTURE: CPT

## 2022-05-16 PROCEDURE — 2709999900 HC NON-CHARGEABLE SUPPLY: Performed by: UROLOGY

## 2022-05-16 PROCEDURE — 7100000011 HC PHASE II RECOVERY - ADDTL 15 MIN: Performed by: UROLOGY

## 2022-05-16 RX ORDER — ATORVASTATIN CALCIUM 40 MG/1
40 TABLET, FILM COATED ORAL DAILY
COMMUNITY

## 2022-05-16 RX ORDER — DEXAMETHASONE SODIUM PHOSPHATE 10 MG/ML
INJECTION, EMULSION INTRAMUSCULAR; INTRAVENOUS PRN
Status: DISCONTINUED | OUTPATIENT
Start: 2022-05-16 | End: 2022-05-16 | Stop reason: SDUPTHER

## 2022-05-16 RX ORDER — EPHEDRINE SULFATE/0.9% NACL/PF 50 MG/5 ML
SYRINGE (ML) INTRAVENOUS PRN
Status: DISCONTINUED | OUTPATIENT
Start: 2022-05-16 | End: 2022-05-16 | Stop reason: SDUPTHER

## 2022-05-16 RX ORDER — PROPOFOL 10 MG/ML
INJECTION, EMULSION INTRAVENOUS PRN
Status: DISCONTINUED | OUTPATIENT
Start: 2022-05-16 | End: 2022-05-16 | Stop reason: SDUPTHER

## 2022-05-16 RX ORDER — ONDANSETRON 2 MG/ML
INJECTION INTRAMUSCULAR; INTRAVENOUS PRN
Status: DISCONTINUED | OUTPATIENT
Start: 2022-05-16 | End: 2022-05-16 | Stop reason: SDUPTHER

## 2022-05-16 RX ORDER — GLYCOPYRROLATE 1 MG/5 ML
SYRINGE (ML) INTRAVENOUS PRN
Status: DISCONTINUED | OUTPATIENT
Start: 2022-05-16 | End: 2022-05-16 | Stop reason: SDUPTHER

## 2022-05-16 RX ORDER — NEOSTIGMINE METHYLSULFATE 5 MG/5 ML
SYRINGE (ML) INTRAVENOUS PRN
Status: DISCONTINUED | OUTPATIENT
Start: 2022-05-16 | End: 2022-05-16 | Stop reason: SDUPTHER

## 2022-05-16 RX ORDER — FENTANYL CITRATE 50 UG/ML
INJECTION, SOLUTION INTRAMUSCULAR; INTRAVENOUS PRN
Status: DISCONTINUED | OUTPATIENT
Start: 2022-05-16 | End: 2022-05-16 | Stop reason: SDUPTHER

## 2022-05-16 RX ORDER — ROCURONIUM BROMIDE 10 MG/ML
INJECTION, SOLUTION INTRAVENOUS PRN
Status: DISCONTINUED | OUTPATIENT
Start: 2022-05-16 | End: 2022-05-16 | Stop reason: SDUPTHER

## 2022-05-16 RX ORDER — SUCCINYLCHOLINE/SOD CL,ISO/PF 200MG/10ML
SYRINGE (ML) INTRAVENOUS PRN
Status: DISCONTINUED | OUTPATIENT
Start: 2022-05-16 | End: 2022-05-16 | Stop reason: SDUPTHER

## 2022-05-16 RX ORDER — CIPROFLOXACIN 500 MG/1
500 TABLET, FILM COATED ORAL 2 TIMES DAILY
Qty: 6 TABLET | Refills: 0 | Status: SHIPPED | OUTPATIENT
Start: 2022-05-16 | End: 2022-05-19

## 2022-05-16 RX ORDER — PHENAZOPYRIDINE HYDROCHLORIDE 100 MG/1
100 TABLET, FILM COATED ORAL 3 TIMES DAILY PRN
Qty: 21 TABLET | Refills: 0 | Status: SHIPPED | OUTPATIENT
Start: 2022-05-16 | End: 2022-05-23

## 2022-05-16 RX ORDER — SODIUM CHLORIDE 9 MG/ML
INJECTION, SOLUTION INTRAVENOUS CONTINUOUS
Status: DISCONTINUED | OUTPATIENT
Start: 2022-05-16 | End: 2022-05-16 | Stop reason: HOSPADM

## 2022-05-16 RX ADMIN — SODIUM CHLORIDE: 9 INJECTION, SOLUTION INTRAVENOUS at 14:21

## 2022-05-16 RX ADMIN — Medication 10 MG: at 13:56

## 2022-05-16 RX ADMIN — DEXAMETHASONE SODIUM PHOSPHATE 10 MG: 10 INJECTION, EMULSION INTRAMUSCULAR; INTRAVENOUS at 13:40

## 2022-05-16 RX ADMIN — Medication 3000 MG: at 13:36

## 2022-05-16 RX ADMIN — ROCURONIUM BROMIDE 30 MG: 50 INJECTION, SOLUTION INTRAVENOUS at 13:51

## 2022-05-16 RX ADMIN — FENTANYL CITRATE 100 MCG: 50 INJECTION, SOLUTION INTRAMUSCULAR; INTRAVENOUS at 13:28

## 2022-05-16 RX ADMIN — Medication 5 MG: at 14:20

## 2022-05-16 RX ADMIN — Medication 100 MG: at 13:28

## 2022-05-16 RX ADMIN — Medication 10 MG: at 13:44

## 2022-05-16 RX ADMIN — Medication 10 MG: at 13:45

## 2022-05-16 RX ADMIN — ONDANSETRON 4 MG: 2 INJECTION INTRAMUSCULAR; INTRAVENOUS at 13:41

## 2022-05-16 RX ADMIN — SODIUM CHLORIDE: 9 INJECTION, SOLUTION INTRAVENOUS at 10:52

## 2022-05-16 RX ADMIN — Medication 0.8 MG: at 14:20

## 2022-05-16 RX ADMIN — PROPOFOL 150 MG: 10 INJECTION, EMULSION INTRAVENOUS at 13:28

## 2022-05-16 RX ADMIN — Medication 140 MG: at 13:28

## 2022-05-16 RX ADMIN — PROPOFOL 50 MG: 10 INJECTION, EMULSION INTRAVENOUS at 13:51

## 2022-05-16 ASSESSMENT — PAIN DESCRIPTION - FREQUENCY: FREQUENCY: INTERMITTENT

## 2022-05-16 ASSESSMENT — PAIN DESCRIPTION - DESCRIPTORS: DESCRIPTORS: BURNING

## 2022-05-16 ASSESSMENT — PAIN SCALES - GENERAL
PAINLEVEL_OUTOF10: 2
PAINLEVEL_OUTOF10: 3
PAINLEVEL_OUTOF10: 0
PAINLEVEL_OUTOF10: 3
PAINLEVEL_OUTOF10: 6
PAINLEVEL_OUTOF10: 2

## 2022-05-16 ASSESSMENT — PAIN - FUNCTIONAL ASSESSMENT: PAIN_FUNCTIONAL_ASSESSMENT: 0-10

## 2022-05-16 ASSESSMENT — PAIN DESCRIPTION - LOCATION: LOCATION: PENIS

## 2022-05-16 ASSESSMENT — PAIN DESCRIPTION - PAIN TYPE: TYPE: ACUTE PAIN

## 2022-05-16 ASSESSMENT — PAIN DESCRIPTION - ONSET: ONSET: ON-GOING

## 2022-05-16 NOTE — PROGRESS NOTES
Pt admitted to [facility] room 18 and oriented to unit. SCD sleeves applied. Nares swabbed. Pt verbalized permission for first name, last initial and physicians name on white board. SDS board and discharge criteria explained, pt and family verbalized understanding. Pt denies thoughts of harming self or others. Call light in reach. Family at the bedside.

## 2022-05-16 NOTE — PROGRESS NOTES
Pt returned to [facility] room 18. Vitals and assessment as charted. 0.9 infusing, @600ml to count from PACU. Pt has ice cream and Pepsi. Family at the bedside. Pt and family verbalized understanding of discharge criteria and call light use. Call light in reach.

## 2022-05-16 NOTE — H&P
[facility]  Urology H&P Note     Patient:  Francesco Umanzor  MRN: [id number]  YOB: 1950    ATTENDING: Sulaiman Casper MD     CHIEF COMPLAINT:  BPH    HISTORY OF PRESENT ILLNESS:   The patient is a 66 y.o. male who presents with BPH with obstruction    Patient's old records, notes and chart reviewed and summarized above. Past Medical History:    Past Medical History:   Diagnosis Date    Arthritis     Back, knees    CAD (coronary artery disease)     CHF (congestive heart failure) ([facility] [address]     \"CABG X5\"    COPD (chronic obstructive pulmonary disease) ([facility] [address]     Hx of blood clots     Hyperlipidemia     Hypertension     PE (pulmonary embolism) 2003    Pneumonia     pneumonia in 2003       Past Surgical History:    Past Surgical History:   Procedure Laterality Date    CARDIAC SURGERY      CABG X5    COLONOSCOPY      CORON[facility]FT  [facility]    ENDOSCOPY, COLON, DIAGNOSTIC      HEEL SPUR SURGERY Left 2007    HEEL SPUR SURGERY Right 2008    HYDROCELE EXCISION Right 1998    INGUINAL HERNIA REPAIR Right 1992    KNEE SURGERY  2016    right knee    VENA CAVA FILTER PLACEMENT  10/2003    cecilio filter       Medications Prior to Admission:   Prior to Admission medications    Medication Sig Start Date End Date Taking?  Authorizing Provider   amLODIPine (NORVASC) 10 MG tablet Take 10 mg by mouth daily    Historical Provider, MD   fluticasone-salmeterol (WIXELA INHUB) 250-50 MCG/DOSE AEPB Inhale 1 puff into the lungs every 12 hours  Patient not taking: Reported on 5/9/2022 12/21/21   Drew Melgoza MD   tiotropium (SPIRIVA RESPIMAT) 2.5 MCG/ACT AERS inhaler Inhale 2 puffs into the lungs daily    Historical Provider, MD   metFORMIN (GLUCOPHAGE) 1000 MG tablet Take 1,000 mg by mouth 2 times daily (with meals)    Historical Provider, MD   clopidogrel (PLAVIX) 75 MG tablet Take 75 mg by mouth daily    Historical Provider, MD   pantoprazole sodium (PROTONIX) 40 MG PACK packet Take 40 mg by mouth every morning (before breakfast)    Historical Provider, MD   aspirin 81 MG EC tablet Take 81 mg by mouth daily    Historical Provider, MD   nitroGLYCERIN (NITROSTAT) 0.4 MG SL tablet Place 0.4 mg under the tongue every 5 minutes as needed for Chest pain up to max of 3 total doses. If no relief after 1 dose, call 911. Historical Provider, MD   finasteride (PROSCAR) 5 MG tablet Take 1 tablet by mouth daily 21   Lory Rasmussen MD   albuterol-ipratropium (COMBIVENT RESPIMAT)  MCG/ACT AERS inhaler Inhale 1 puff into the lungs 4 times daily    Historical Provider, MD   losartan (COZAAR) 100 MG tablet Take 100 mg by mouth daily. Historical Provider, MD   simvastatin (ZOCOR) 40 MG tablet Take 40 mg by mouth nightly. Historical Provider, MD   Multiple Vitamins-Minerals (MULTIVITAMIN PO) Take  by mouth daily. Historical Provider, MD   Omega-3 Fatty Acids (FISH OIL PO) Take  by mouth daily. Historical Provider, MD   Glucosamine-Chondroitin (GLUCOSAMINE CHONDR COMPLEX PO) Take 1 tablet by mouth 2 times daily     Historical Provider, MD       Allergies:  Patient has no known allergies. Social History:    Social History     Socioeconomic History    Marital status:      Spouse name: Not on file    Number of children: Not on file    Years of education: Not on file    Highest education level: Not on file   Occupational History    Not on file   Tobacco Use    Smoking status: Former Smoker     Quit date: 1980     Years since quittin.4    Smokeless tobacco: Never Used   Vaping Use    Vaping Use: Never used   Substance and Sexual Activity    Alcohol use:  Yes     Alcohol/week: 1.0 standard drink     Types: 1 Cans of beer per week     Comment: occasionally    Drug use: No    Sexual activity: Not on file   Other Topics Concern    Not on file   Social History Narrative    Not on file Social Determinants of Health     Financial Resource Strain:     Difficulty of Paying Living Expenses: Not on file   Food Insecurity:     Worried About Running Out of Food in the Last Year: Not on file    Amara of Food in the Last Year: Not on file   Transportation Needs:     Lack of Transportation (Medical): Not on file    Lack of Transportation (Non-Medical): Not on file   Physical Activity:     Days of Exercise per Week: Not on file    Minutes of Exercise per Session: Not on file   Stress:     Feeling of Stress : Not on file   Social Connections:     Frequency of Communication with Friends and Family: Not on file    Frequency of Social Gatherings with Friends and Family: Not on file    Attends Hindu Services: Not on file    Active Member of [address] [place] [address] [employer] or Organizations: Not on file    Attends Club or Organization Meetings: Not on file    Marital Status: Not on file   Intimate Partner Violence:     Fear of Current or Ex-Partner: Not on file    Emotionally Abused: Not on file    Physically Abused: Not on file    Sexually Abused: Not on file   Housing Stability:     Unable to Pay for Housing in the Last Year: Not on file    Number of [place] in the Last Year: Not on file    Unstable Housing in the Last Year: Not on file       Family History:    Family History   Problem Relation Age of Onset    Cancer Mother     High Blood Pressure Mother     Kidney Disease Mother     Heart Disease Father     Heart Disease Maternal Uncle        REVIEW OF SYSTEMS:  All systems reviewed and negative except for that already noted in the HPI. Physical Exam:      No data found. Constitutional: Patient in no acute distress; Neuro: alert and oriented to person place and time. Psych: Mood and affect normal.  Skin: Normal  Lungs: Respiratory effort normal  Cardiovascular:  Normal peripheral pulses  Abdomen: Soft, non-tender, non-distended with no CVA, flank pain, hepatosplenomegaly or hernia. Kidneys normal.  Bladder non-tender and not distended. Lymphatics: no palpable lymphadenopathy        Assessment and Plan   Impression:    Patient Active Problem List   Diagnosis    Chest pain       Plan:   Greenlight PVP  Risks benefits and alternative procedures are explained, informed consent is obtained, and the patient elects to proceed.

## 2022-05-16 NOTE — PROGRESS NOTES
1428 Pt arrives to recovery responsive to verbal stimulation. Pt respirations are unlabored. VSS. Pt denies any pain. 1438 Pt states his pain is a 3/10 and tolerable. Pt VSS. Respirations are unlabored.  Pt tolerating ice chips  1448 pt status remains unchanged   1458 Pt meets criteria for discharge from recovery at this time

## 2022-05-16 NOTE — OP NOTE
FACILITY:  [address] [place] [address] [facility] [address]:  05/16/22  Tayo Kline  1950  [id number]     Surgeon: Dr. Gamaliel Cooley MD MD  Asst.: Dr. Gamaliel Cooley MD MD  PREOPERATIVE DIAGNOSES:  1. Urinary retention. 2. Benign prostatic hyperplasia with obstruction. POSTOPERATIVE DIAGNOSIS:  1. Urinary retention. 2. Benign prostatic hyperplasia with obstruction. PROCEDURES PERFORMED:  1. Cystoscopy. 2. GreenLight photovaporization of the prostate. ANESTHESIA:  General.  COMPLICATIONS:  None. SPECIMENS:  Urine for culture. ESTIMATED BLOOD LOSS:  Minimal.  DRAINS:  A 22 Divehi 3-way Wagner catheter.     INDICATIONS: Tayo Kline is a 66 y.o. male presents today for GreenLight photovaporization of the prostate. After risks, benefits and alternatives of the procedure were discussed with the patient, informed consent was obtained and the patient elected to proceed.     OPERATIVE SUMMARY:  The risks and benefits of the procedure were explained to the patient in the preoperative area. After informed consent was obtained, the patient was taken back to the operating room. The patient was transferred to the operating table and placed in a supine position. General anesthesia was induced and the patient was placed in the dorsal lithotomy position. He was prepped and draped in a sterile fashion and a time-out was performed to confirm patient identity and procedure. Prior to induction of anesthesia the patient was administered preoperative antibiotics and EPC cuffs were on and functioning. Our continuous flow sheath with obturator and lens was inserted through the patient's urethra and into the bladder. Upon entering the bladder we located both ureteral orifices, they were at a safe distance from the vesical neck. On evaluation of the prostate the patient was noted to have small median lobe.  The GreenLight fiber was then inserted after we removed our obturator and placed our working bridge through out continous flow sheath. GreenLight photovaporization was initiated beginning with the median lobe. After the median lobe was taken down, we then turned our attention to the patient's left lateral lobe. At the 5 O'clock position we made a groove from the vesical neck down to the level of the verumontanum, which was used as our distal landmark to protect the external sphincter. We vaporized adenomatous tissue down to the level of the capsule. During this part of the procedure the power level was increased to 160 groves. This was used as a guide of depth, and carried around in a counter-clockwise fashion to the 12 O'clock position. The process was then repeated on the contralateral side starting at the 7 O'clock position. We vaporized adenomatous tissue down to the level of the capsule, which again was used as a guide of depth, and carried around in a clockwise fashion to the 12 O'clock position. Finally we turned our attention to the apical tissue. Extensive photovaporization of the prostate was performed. We then incised the bladder neck with our laser. At this time the irrigation was turned off and the prostatic urethra appeared to be wide open and no longer obstructed. Hemostasis was achieved and persistent. Both ureteral orifices were noted to be uninvolved in the resection. There was no scatter of laser fiber into the bladder. We did not go distal to the verumontanum. We worked at 80 groves of power at the bladder neck and near the apex of the prostate. The scope was removed and a 22-Beninese 3-way Wagner catheter was inserted and irrigated to confirm position. Continuous bladder irrigation with normal saline was then initiated and 3 L of normal saline were allowed to infuse before the catheter was clamped. The patient was awoken and transferred to PACU.  All instruments and equipment were accounted for at the end of the procedure.      DISPOSITION:  The patient was transferred to PACU in good condition.  He will be discharged home from the hospital per the PACU team.     Follow up tomorrow for catheter removal  2 - 4 week OV

## 2022-05-16 NOTE — ANESTHESIA PRE PROCEDURE
Department of Anesthesiology  Preprocedure Note       Name:  Carey Desai   Age:  66 y.o.  :  1950                                          MRN:  [id number]         Date:  2022      Surgeon: Trent Cowart):  Kamilah Amin MD    Procedure: Procedure(s):  CYSTOSCOPY, GREENLIGHT PHOTO VAPORIZATION OF THE PROSTATE    Medications prior to admission:   Prior to Admission medications    Medication Sig Start Date End Date Taking? Authorizing Provider   atorvastatin (LIPITOR) 40 MG tablet Take 40 mg by mouth daily   Yes Historical Provider, MD   amLODIPine (NORVASC) 10 MG tablet Take 10 mg by mouth daily    Historical Provider, MD   tiotropium (SPIRIVA RESPIMAT) 2.5 MCG/ACT AERS inhaler Inhale 2 puffs into the lungs daily    Historical Provider, MD   metFORMIN (GLUCOPHAGE) 1000 MG tablet Take 1,000 mg by mouth 2 times daily (with meals)    Historical Provider, MD   clopidogrel (PLAVIX) 75 MG tablet Take 75 mg by mouth daily    Historical Provider, MD   pantoprazole sodium (PROTONIX) 40 MG PACK packet Take 40 mg by mouth every morning (before breakfast)    Historical Provider, MD   aspirin 81 MG EC tablet Take 81 mg by mouth daily    Historical Provider, MD   nitroGLYCERIN (NITROSTAT) 0.4 MG SL tablet Place 0.4 mg under the tongue every 5 minutes as needed for Chest pain up to max of 3 total doses. If no relief after 1 dose, call 911. Historical Provider, MD   albuterol-ipratropium (COMBIVENT RESPIMAT)  MCG/ACT AERS inhaler Inhale 1 puff into the lungs 4 times daily    Historical Provider, MD   losartan (COZAAR) 100 MG tablet Take 100 mg by mouth daily. Historical Provider, MD   simvastatin (ZOCOR) 40 MG tablet Take 40 mg by mouth nightly. Historical Provider, MD   Multiple Vitamins-Minerals (MULTIVITAMIN PO) Take  by mouth daily. Historical Provider, MD   Omega-3 Fatty Acids (FISH OIL PO) Take  by mouth daily.     Historical Provider, MD   Glucosamine-Chondroitin (Mahad Palomares COMPLEX PO) Take 1 tablet by mouth 2 times daily     Historical Provider, MD       Current medications:    Current Facility-Administered Medications   Medication Dose Route Frequency Provider Last Rate Last Admin    ceFAZolin (ANCEF) 3000 mg in dextrose 5 % 100 mL IVPB  3,000 mg IntraVENous 30 Min Pre-Op Jayy Colón MD        0.9 % sodium chloride infusion   IntraVENous Continuous Jayy Colón  mL/hr at 22 1052 New Bag at 22 1052       Allergies:  No Known Allergies    Problem List:    Patient Active Problem List   Diagnosis Code    Chest pain R07.9       Past Medical History:        Diagnosis Date    Arthritis     Back, knees    CAD (coronary artery disease)     [facility]estive [facility]) [facility] [address]     \"CABG X5\"    COPD (chronic obstructive pulmonary disease) ([facility] [address]     Hx of blood clots     Hyperlipidemia     Hypertension     PE (pulmonary embolism)     Pneumonia     pneumonia in        Past Surgical History:        Procedure Laterality Date    CARDIAC SURGERY      CABG X5    COLONOSCOPY      CORONARY [facility] GRAFT  10/2003    [facility]    ENDOSCOPY, COLON, DIAGNOSTIC      HEEL SPUR SURGERY Left 2007    HEEL SPUR SURGERY Right 2008    HYDROCELE EXCISION Right 1998    INGUINAL HERNIA REPAIR Right 1992    KNEE SURGERY  2016    right knee    SKIN CANCER EXCISION      pt states he had 2 skin spots removed from chest and left arm     VENA CAVA FILTER PLACEMENT  10/2003    cecilio filter       Social History:    Social History     Tobacco Use    Smoking status: Former Smoker     Quit date: 1980     Years since quittin.4    Smokeless tobacco: Never Used   Substance Use Topics    Alcohol use:  Yes     Alcohol/week: 1.0 standard drink     Types: 1 Cans of beer per week     Comment: occasionally                                Counseling given: Not Answered      Vital Signs (Current):   Vitals:    22 1056 22 1009   BP:  137/83 II        Dental:          Pulmonary: breath sounds clear to auscultation  (+) pneumonia:  COPD:                             Cardiovascular:  Exercise tolerance: good (>4 METS),   (+) hypertension:, CAD:, CABG/stent:, CHF:,       ECG reviewed  Rhythm: regular  Rate: normal                    Neuro/Psych:               GI/Hepatic/Renal:             Endo/Other:                     Abdominal:   (+) obese,           Vascular: Other Findings:             Anesthesia Plan      general     ASA 3       Induction: intravenous. Anesthetic plan and risks discussed with patient. Plan discussed with CRNA.                   [address] [place] [address],    5/16/2022

## 2022-05-16 NOTE — PROGRESS NOTES
Prescription called into [facility] for Ciprofloxacin and Phenazopyridine for Dr. Stacy Perkins talked with Leonel Cifuentes in pharmacy.

## 2022-05-16 NOTE — PROGRESS NOTES
Patient ambulated in araujo. Patient tolerated well. Urine remains clear, cherry colored. Patient and wife educated on how to change to leg bag. Leg bag applied. Patient sent with new feng catheter bag for overnight. Patient and wife verbalize understanding.

## 2022-05-16 NOTE — PROGRESS NOTES

## 2022-05-17 ENCOUNTER — NURSE ONLY (OUTPATIENT)
Dept: UROLOGY | Age: 72
End: 2022-05-17

## 2022-05-17 DIAGNOSIS — N40.1 BENIGN PROSTATIC HYPERPLASIA WITH LOWER URINARY TRACT SYMPTOMS, SYMPTOM DETAILS UNSPECIFIED: Primary | ICD-10-CM

## 2022-05-17 PROCEDURE — 99999 PR OFFICE/OUTPT VISIT,PROCEDURE ONLY: CPT | Performed by: NURSE PRACTITIONER

## 2022-05-17 NOTE — PROGRESS NOTES
Patient has given me verbal consent to perform feng removal  Yes    40 cc of water deflated from feng balloon. 22 Fr feng removed without difficulty. Foreskin reduced back down? No    Pt will drink fluids today. Pt will then call office by 2:30 pm if he isn't able to urinate on his own and we will ask provider what the plan should be. Pt will drink fluids and call in 4-6 hours if patient has not urinated. F/u with provider as scheduled.

## 2022-05-19 ENCOUNTER — TELEPHONE (OUTPATIENT)
Dept: UROLOGY | Age: 72
End: 2022-05-19

## 2022-05-19 NOTE — TELEPHONE ENCOUNTER
Patient had Greenlight Photo Vaporization on 5/16/2022; He called to let us know that on Tuesday he was going to the restroom every 10-15 minutes, he had burning, so he reduced his water intake with the thought that less in would be less using the restroom. That evening he started running a low grade fever and contacted the [facility] nurse to let her know he may go into the ER. After he thought about it, it thought he might be dehydrated and started increasing his water intake. His fever went down; he is now feeling better. Patient was told by the [facility] that he should notify us regarding this.

## 2022-06-09 ENCOUNTER — HOSPITAL ENCOUNTER (OUTPATIENT)
Age: 72
Discharge: HOME | End: 2022-06-09
Payer: MEDICAID

## 2022-06-09 DIAGNOSIS — R31.0 GROSS HEMATURIA: ICD-10-CM

## 2022-06-09 LAB
BACTERIA: ABNORMAL
BILIRUBIN URINE: NEGATIVE
BLOOD, URINE: ABNORMAL
CASTS: ABNORMAL /LPF
CASTS: ABNORMAL /LPF
CHARACTER, URINE: CLEAR
COLOR: ABNORMAL
CRYSTALS: ABNORMAL
EPITHELIAL CELLS, UA: ABNORMAL /HPF
GLUCOSE, URINE: NEGATIVE MG/DL
KETONES, URINE: NEGATIVE
LEUKOCYTE EST, POC: ABNORMAL
MISCELLANEOUS LAB TEST RESULT: ABNORMAL
NITRITE, URINE: NEGATIVE
PH UA: 6 (ref 5–9)
PROTEIN UA: 100 MG/DL
RBC URINE: > 200 /HPF
RENAL EPITHELIAL, UA: ABNORMAL
SPECIFIC GRAVITY UA: 1.01 (ref 1–1.03)
UROBILINOGEN, URINE: 1 EU/DL (ref 0–1)
WBC UA: ABNORMAL /HPF
YEAST: ABNORMAL

## 2022-06-09 PROCEDURE — 81001 URINALYSIS AUTO W/SCOPE: CPT

## 2022-06-09 PROCEDURE — 87086 URINE CULTURE/COLONY COUNT: CPT

## 2022-06-10 ENCOUNTER — TELEPHONE (OUTPATIENT)
Dept: UROLOGY | Age: 72
End: 2022-06-10

## 2022-06-10 NOTE — TELEPHONE ENCOUNTER
Pt calling asking results for urine test done yesterday. Culture not in. Pt did stop plavix starting today. Informed patient that I did send results to be reviewed by provider.

## 2022-06-11 LAB — URINE CULTURE, ROUTINE: NORMAL

## 2022-06-13 NOTE — TELEPHONE ENCOUNTER
lvm with petra about culture being negative. No further treatment needed. To call if still having hematuria.

## 2022-06-17 ENCOUNTER — OFFICE VISIT (OUTPATIENT)
Dept: UROLOGY | Age: 72
End: 2022-06-17
Payer: OTHER GOVERNMENT

## 2022-06-17 VITALS
BODY MASS INDEX: 36.51 KG/M2 | HEIGHT: 70 IN | WEIGHT: 255 LBS | DIASTOLIC BLOOD PRESSURE: 72 MMHG | SYSTOLIC BLOOD PRESSURE: 124 MMHG

## 2022-06-17 DIAGNOSIS — N13.8 BPH WITH OBSTRUCTION/LOWER URINARY TRACT SYMPTOMS: Primary | ICD-10-CM

## 2022-06-17 DIAGNOSIS — N40.1 BPH WITH OBSTRUCTION/LOWER URINARY TRACT SYMPTOMS: Primary | ICD-10-CM

## 2022-06-17 LAB
BILIRUBIN URINE: NEGATIVE
BLOOD URINE, POC: ABNORMAL
CHARACTER, URINE: CLEAR
COLOR, URINE: YELLOW
GLUCOSE URINE: NEGATIVE MG/DL
KETONES, URINE: NEGATIVE
LEUKOCYTE CLUMPS, URINE: ABNORMAL
NITRITE, URINE: NEGATIVE
PH, URINE: 6 (ref 5–9)
POST VOID RESIDUAL (PVR): 8 ML
PROTEIN, URINE: NEGATIVE MG/DL
SPECIFIC GRAVITY, URINE: 1.01 (ref 1–1.03)
UROBILINOGEN, URINE: 1 EU/DL (ref 0–1)

## 2022-06-17 PROCEDURE — 81003 URINALYSIS AUTO W/O SCOPE: CPT | Performed by: UROLOGY

## 2022-06-17 PROCEDURE — 51798 US URINE CAPACITY MEASURE: CPT | Performed by: UROLOGY

## 2022-06-17 PROCEDURE — 99024 POSTOP FOLLOW-UP VISIT: CPT | Performed by: UROLOGY

## 2022-06-17 RX ORDER — DUTASTERIDE 0.5 MG/1
0.5 CAPSULE, LIQUID FILLED ORAL DAILY
Qty: 90 CAPSULE | Refills: 4 | Status: SHIPPED | OUTPATIENT
Start: 2022-06-17

## 2022-06-17 NOTE — PROGRESS NOTES
MD MD [facility] Consultation / New Patient Visit    Patient:  Panfilo Shelley  YOB: 1950  Date: 6/17/2022  Consult requested from DIMA Matos CNP     HISTORY OF PRESENT ILLNESS:   The patient is a 66 y.o. male who presents today for follow-up for the following problem(s): BPH with LUTs  Overall the problem(s) : are worsening. Associated Symptoms: No dysuria, gross hematuria. Pain Severity:      Today visit:   6/17/22   Patient presents with BPH s/p Greenlight PVP - PVR 8 ml. Symptoms improving. Summary of old records:   (Patient's old records, notes and chart reviewed and summarized above.)    Cystoscopy Operative Note  Surgeon: Maco Renteria MD   Anesthesia: Urethral 2%  Indications: BPH  Position: supine  Findings:   The patient was prepped and draped in the usual sterile fashion. The flexible cystoscope was advanced through the urethra and into the bladder. The bladder was thoroughly inspected and the following was noted:    Residual Urine: Moderate  Urethra: No abnormalities of the urethra are noted. Prostate: Medium to large gland (80 gm) Complete obstruction by lateral & small median lobe of prostate. Bladder: No tumors or CIS noted. No bladder diverticulum. Moderate  trabeculation noted. Ureters: Clear efflux from both ureters. Orifices with normal configuration and location. The cystoscope was removed. The patient tolerated the procedure well.   Plan  Good candidate for Greenlight vs Urolift (would need US prior)        Last several PSA's:  Lab Results   Component Value Date    PSA 0.73 09/22/2021       Last total testosterone:  No results found for: TESTOSTERONE    Urinalysis today:  Results for POC orders placed in visit on 06/17/22   POCT Urinalysis No Micro (Auto)   Result Value Ref Range    Glucose, Ur Negative NEGATIVE mg/dl    Bilirubin Urine Negative     Ketones, Urine Negative NEGATIVE    Specific Gravity, Urine 1.010 1.002 - 1.030    Blood, UA POC Large (A) NEGATIVE    pH, Urine 6.00 5.0 - 9.0    Protein, Urine Negative NEGATIVE mg/dl    Urobilinogen, Urine 1.00 0.0 - 1.0 eu/dl    Nitrite, Urine Negative NEGATIVE    Leukocyte Clumps, Urine Small (A) NEGATIVE    Color, Urine Yellow YELLOW-STRAW    Character, Urine Clear CLR-SL.CLOUD   poct post void residual   Result Value Ref Range    post void residual 8 ml         Last BUN and creatinine:  Lab Results   Component Value Date    BUN 16 05/03/2022     Lab Results   Component Value Date    CREATININE 0.7 05/03/2022       Imaging Reviewed during this Office Visit:   (results were independently reviewed by physician and radiology report verified)    PAST MEDICAL, FAMILY AND SOCIAL HISTORY:  Past Medical History:   Diagnosis Date    Arthritis     Back, knees    CAD (coronary artery disease)     CHF (congestive heart failure) ([facility] [address]     \"CABG X5\"    COPD (chronic obstructive pulmonary disease) [facility] [address]     Hx of blood clots     Hyperlipidemia     Hypertension     PE (pulmonary embolism) 2003    Pneumonia     pneumonia in 2003     Past Surgical History:   Procedure Laterality Date    CARDIAC SURGERY      CABG X5    COLONOSCOPY      CORONARY [facility]ERY [facility]RAFT  10/2003    [facility]    ENDOSCOPY, COLON, DIAGNOSTIC      HEEL SPUR SURGERY Left 2007    HEEL SPUR SURGERY Right 2008    HYDROCELE EXCISION Right 1998    INGUINAL HERNIA REPAIR Right 1992    KNEE SURGERY  2016    right knee    SKIN CANCER EXCISION      pt states he had 2 skin spots removed from chest and left arm     TURP N/A 5/16/2022    CYSTOSCOPY, GREENLIGHT PHOTO VAPORIZATION OF THE PROSTATE performed by Lory Rasmussen MD at [address] [place] [address]  10/2003    cecilio filter     Family History   Problem Relation Age of Onset    Cancer Mother     High Blood Pressure Mother     Kidney Disease Mother     Heart Disease Father     Heart Disease Maternal Uncle Outpatient Medications Marked as Taking for the 22 encounter (Office Visit) with Kirby Shukla MD   Medication Sig Dispense Refill    atorvastatin (LIPITOR) 40 MG tablet Take 40 mg by mouth daily      amLODIPine (NORVASC) 10 MG tablet Take 10 mg by mouth daily      tiotropium (SPIRIVA RESPIMAT) 2.5 MCG/ACT AERS inhaler Inhale 2 puffs into the lungs daily      metFORMIN (GLUCOPHAGE) 1000 MG tablet Take 1,000 mg by mouth 2 times daily (with meals)      clopidogrel (PLAVIX) 75 MG tablet Take 75 mg by mouth daily      pantoprazole sodium (PROTONIX) 40 MG PACK packet Take 40 mg by mouth every morning (before breakfast)      aspirin 81 MG EC tablet Take 81 mg by mouth daily      nitroGLYCERIN (NITROSTAT) 0.4 MG SL tablet Place 0.4 mg under the tongue every 5 minutes as needed for Chest pain up to max of 3 total doses. If no relief after 1 dose, call 911.  albuterol-ipratropium (COMBIVENT RESPIMAT)  MCG/ACT AERS inhaler Inhale 1 puff into the lungs 4 times daily      losartan (COZAAR) 100 MG tablet Take 100 mg by mouth daily.  simvastatin (ZOCOR) 40 MG tablet Take 40 mg by mouth nightly.  Multiple Vitamins-Minerals (MULTIVITAMIN PO) Take  by mouth daily.  Omega-3 Fatty Acids (FISH OIL PO) Take  by mouth daily.  Glucosamine-Chondroitin (GLUCOSAMINE CHONDR COMPLEX PO) Take 1 tablet by mouth 2 times daily          Patient has no known allergies.   Social History     Tobacco Use   Smoking Status Former Smoker    Quit date: 1980    Years since quittin.4   Smokeless Tobacco Never Used       Social History     Substance and Sexual Activity   Alcohol Use Yes    Alcohol/week: 1.0 standard drink    Types: 1 Cans of beer per week    Comment: occasionally       REVIEW OF SYSTEMS:  Constitutional: negative  Eyes: negative  Respiratory: negative  Cardiovascular: negative  Gastrointestinal: negative  Musculoskeletal: negative  Genitourinary: negative  Skin: negative Neurological: negative  Hematological/Lymphatic: negative  Psychological: negative    Physical Exam:    This a 66 y.o. male   Vitals:    06/17/22 1046   BP: 124/72     Constitutional: Patient in no acute distress   Neuro: alert and oriented to person place and time. Psych: Mood and affect normal.  Head: atraumatic normocephalic  Eyes: EOMi  HEENT: neck supple, trachea midline  Lungs: Respiratory effort normal  Cardiovascular:  Normal peripheral pulses  Abdomen: Soft, non-tender, non-distended, No CVA  Bladder: non-tender and not distended. FROMx4, no cyanosis clubbing edema  Skin: warm and dry      Assessment and Plan      1. BPH with obstruction/lower urinary tract symptoms           Plan:      No follow-ups on file. BPH with LUTs - controlled after Greenlight PVP  - will start dutasteride to prevent regrowth.

## 2022-08-11 ENCOUNTER — PATIENT MESSAGE (OUTPATIENT)
Dept: UROLOGY | Age: 72
End: 2022-08-11

## 2022-08-11 NOTE — TELEPHONE ENCOUNTER
From: Hilario Sidhu  To: Dr. Venancio Concepcion: 8/11/2022 9:19 AM EDT  Subject: Unice Gathers after surgery    It's bewen 3 months now since I had the Greenlight surgery. Thought you would like to know how things are going. My steam is strong and I seem to empty when I go. Still have to get up multiple times during the night to pee. When I have to go I still can't hold it. So I hurry to the restroom. Sometimes I still experience burning when I go but not always. When I urinate I never know what direction the flow will go. So I have to sit to pee or it can go everywhere. It usually starts out going left then shiftd to the right and then maybe straitens up. This can be quite a disaster. I just thought you should know what I am dealing with after 3 months.     Thanks,    Dearl Marquis

## 2022-08-17 ENCOUNTER — HOSPITAL ENCOUNTER (EMERGENCY)
Age: 72
Discharge: HOME | End: 2022-08-17
Attending: EMERGENCY MEDICINE
Payer: OTHER GOVERNMENT

## 2022-08-17 ENCOUNTER — APPOINTMENT (OUTPATIENT)
Dept: GENERAL RADIOLOGY | Age: 72
End: 2022-08-17
Payer: OTHER GOVERNMENT

## 2022-08-17 VITALS
RESPIRATION RATE: 18 BRPM | OXYGEN SATURATION: 96 % | SYSTOLIC BLOOD PRESSURE: 153 MMHG | HEART RATE: 80 BPM | DIASTOLIC BLOOD PRESSURE: 75 MMHG | BODY MASS INDEX: 36.59 KG/M2 | TEMPERATURE: 98.7 F | WEIGHT: 255 LBS

## 2022-08-17 DIAGNOSIS — S80.12XA CONTUSION OF LEFT KNEE AND LOWER LEG, INITIAL ENCOUNTER: Primary | ICD-10-CM

## 2022-08-17 DIAGNOSIS — S80.02XA CONTUSION OF LEFT KNEE AND LOWER LEG, INITIAL ENCOUNTER: Primary | ICD-10-CM
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
## 2022-08-17 DIAGNOSIS — S80.212A ABRASION, LEFT KNEE, INITIAL ENCOUNTER: ICD-10-CM

## 2022-08-17 DIAGNOSIS — M17.12 PRIMARY OSTEOARTHRITIS OF LEFT KNEE: ICD-10-CM

## 2022-08-17 PROCEDURE — 73564 X-RAY EXAM KNEE 4 OR MORE: CPT

## 2022-08-17 PROCEDURE — 99213 OFFICE O/P EST LOW 20 MIN: CPT

## 2022-08-17 PROCEDURE — 99203 OFFICE O/P NEW LOW 30 MIN: CPT | Performed by: EMERGENCY MEDICINE

## 2022-08-17 ASSESSMENT — ENCOUNTER SYMPTOMS
SORE THROAT: 0
DIARRHEA: 0
STRIDOR: 0
EYE REDNESS: 0
SINUS PRESSURE: 0
TROUBLE SWALLOWING: 0
VOMITING: 0
NAUSEA: 0
ABDOMINAL PAIN: 0
EYE PAIN: 0
ROS SKIN COMMENTS: ABRASION LEFT KNEE
COUGH: 0
VOICE CHANGE: 0
WHEEZING: 0
SHORTNESS OF BREATH: 0
BACK PAIN: 0
EYE DISCHARGE: 0

## 2022-08-17 ASSESSMENT — PAIN - FUNCTIONAL ASSESSMENT: PAIN_FUNCTIONAL_ASSESSMENT: 0-10

## 2022-08-17 ASSESSMENT — PAIN SCALES - GENERAL: PAINLEVEL_OUTOF10: 7

## 2022-08-17 ASSESSMENT — PAIN DESCRIPTION - PAIN TYPE: TYPE: ACUTE PAIN

## 2022-08-17 ASSESSMENT — PAIN DESCRIPTION - FREQUENCY: FREQUENCY: CONTINUOUS

## 2022-08-17 ASSESSMENT — PAIN DESCRIPTION - LOCATION: LOCATION: KNEE

## 2022-08-17 ASSESSMENT — PAIN DESCRIPTION - ORIENTATION: ORIENTATION: LEFT

## 2022-08-17 ASSESSMENT — PAIN DESCRIPTION - DESCRIPTORS: DESCRIPTORS: ACHING

## 2022-08-17 NOTE — ED TRIAGE NOTES
Patient ambulated to room with c/o left knee pain beginning two days ago, post fall at his home. Abrasions and edema noted to left knee.

## 2022-08-17 NOTE — ED PROVIDER NOTES
[address] [facility] Encounter      [facility]       Chief Complaint   Patient presents with    Knee Pain     Left         Nurses Notes reviewed and I agree except as noted in the HPI. HISTORY OF PRESENT ILLNESS   Julietta Schwab is a 67 y.o. male who presents 2 days after he fell at home in [facility], striking his left knee with abrasions, swelling and pain. He rates pain 7 out of 10 in severity. Patient able ambulate. No head neck or back injury. No deformity, locking up or instability of left knee. History of PE and DVT with IVC filter. UTD tetnaus. Quit smoking. REVIEW OF SYSTEMS     Review of Systems   Constitutional:  Negative for appetite change, chills, fatigue, fever and unexpected weight change. Normal appetite no fever   HENT:  Negative for congestion, ear discharge, ear pain, sinus pressure, sneezing, sore throat, trouble swallowing and voice change. No head or facial injury   Eyes:  Negative for pain, discharge and redness. No redness or drainage   Respiratory:  Negative for cough, shortness of breath, wheezing and stridor. No cough or shortness of breath   Cardiovascular:  Negative for chest pain and leg swelling. No chest pain or syncope   Gastrointestinal:  Negative for abdominal pain, diarrhea, nausea and vomiting. No abdominal pain or vomiting   Genitourinary:  Negative for dysuria, frequency, hematuria and urgency. Musculoskeletal:  Positive for joint swelling. Negative for arthralgias, back pain, myalgias and neck pain. Abrasion swelling pain left knee after fall   Skin:  Negative for rash. Abrasion left knee   Neurological:  Negative for dizziness, syncope, weakness and headaches. No head injury or lethargy   Hematological:  Negative for adenopathy. Psychiatric/Behavioral:  Negative for behavioral problems, confusion, sleep disturbance and suicidal ideas.  The patient is not nervous/anxious. All other systems reviewed and are negative. Red and bold elements reviewed  PAST MEDICAL HISTORY         Diagnosis Date    Arthritis     Back, knees    CAD (coronary artery disease)     CHF (congestive heart failure) (HCC)     \"CABG X5\"    COPD (chronic obstructive pulmonary disease) (HCC)     Hx of blood clots     Hyperlipidemia     Hypertension     PE (pulmonary embolism) 2003    Pneumonia     pneumonia in 2003       SURGICAL HISTORY     Patient  has a past surgical history that includes Coronary artery bypass graft (10/2003); Vena Cava Filter Placement (10/2003); Inguinal hernia repair (Right, 1992); Hydrocele surgery (Right, 1998); Heel spur surgery (Left, 2007); Heel spur surgery (Right, 2008); Colonoscopy; knee surgery (2016); Cardiac surgery; Endoscopy, colon, diagnostic; Skin cancer excision; and TURP (N/A, 5/16/2022). CURRENT MEDICATIONS       Discharge Medication List as of 8/17/2022  7:42 PM        CONTINUE these medications which have NOT CHANGED    Details   dutasteride (AVODART) 0.5 MG capsule Take 1 capsule by mouth daily, Disp-90 capsule, R-4Print      atorvastatin (LIPITOR) 40 MG tablet Take 40 mg by mouth dailyHistorical Med      amLODIPine (NORVASC) 10 MG tablet Take 10 mg by mouth dailyHistorical Med      tiotropium (SPIRIVA RESPIMAT) 2.5 MCG/ACT AERS inhaler Inhale 2 puffs into the lungs dailyHistorical Med      metFORMIN (GLUCOPHAGE) 1000 MG tablet Take 1,000 mg by mouth 2 times daily (with meals)Historical Med      clopidogrel (PLAVIX) 75 MG tablet Take 75 mg by mouth dailyHistorical Med      pantoprazole sodium (PROTONIX) 40 MG PACK packet Take 40 mg by mouth every morning (before breakfast)Historical Med      aspirin 81 MG EC tablet Take 81 mg by mouth dailyHistorical Med      nitroGLYCERIN (NITROSTAT) 0.4 MG SL tablet Place 0.4 mg under the tongue every 5 minutes as needed for Chest pain up to max of 3 total doses. If no relief after 1 dose, call 911.  Historical Med albuterol-ipratropium (COMBIVENT RESPIMAT)  MCG/ACT AERS inhaler Inhale 1 puff into the lungs 4 times dailyHistorical Med      losartan (COZAAR) 100 MG tablet Take 100 mg by mouth daily. simvastatin (ZOCOR) 40 MG tablet Take 40 mg by mouth nightly. Multiple Vitamins-Minerals (MULTIVITAMIN PO) Take  by mouth daily. Omega-3 Fatty Acids (FISH OIL PO) Take  by mouth daily. Glucosamine-Chondroitin (GLUCOSAMINE CHONDR COMPLEX PO) Take 1 tablet by mouth 2 times daily Historical Med             ALLERGIES     Patient is has No Known Allergies. FAMILY HISTORY     Patient'sfamily history includes Cancer in his mother; Heart Disease in his father and maternal uncle; High Blood Pressure in his mother; Kidney Disease in his mother. SOCIAL HISTORY     Patient  reports that he quit smoking about 42 years ago. He has never used smokeless tobacco. He reports current alcohol use of about 1.0 standard drink per week. He reports that he does not use drugs. PHYSICAL EXAM     ED TRIAGE VITALS  BP: (!) 153/75, Temp: 98.7 °F (37.1 °C), Heart Rate: 80, Resp: 18, SpO2: 96 %  Physical Exam  Vitals and nursing note reviewed. Constitutional:       General: He is not in acute distress. Appearance: He is well-developed. He is not ill-appearing. Comments: Moist membranes   HENT:      Head: Normocephalic and atraumatic. Right Ear: External ear normal.      Left Ear: External ear normal.      Nose: Nose normal.      Mouth/Throat:      Pharynx: No oropharyngeal exudate. Comments: Oropharynx normal  Eyes:      General: No scleral icterus. Right eye: No discharge. Left eye: No discharge. Extraocular Movements:      Right eye: Normal extraocular motion. Left eye: Normal extraocular motion. Conjunctiva/sclera: Conjunctivae normal.      Pupils: Pupils are equal, round, and reactive to light. Comments:  orbits atraumatic   Neck:      Thyroid: No thyromegaly. Vascular: No JVD. Comments: Neck nontender  Cardiovascular:      Rate and Rhythm: Normal rate and regular rhythm. Pulses: Normal pulses. Heart sounds: Normal heart sounds, S1 normal and S2 normal. No murmur heard. No friction rub. No gallop. Comments: No murmur  Pulmonary:      Effort: Pulmonary effort is normal. No tachypnea or respiratory distress. Breath sounds: Normal breath sounds. No stridor. No decreased breath sounds, wheezing, rhonchi or rales. Comments: Chest atraumatic  Chest:      Chest wall: No tenderness. Abdominal:      General: Bowel sounds are normal. There is no distension. Palpations: Abdomen is soft. There is no mass. Tenderness: There is no abdominal tenderness. There is no guarding or rebound. Musculoskeletal:         General: No tenderness. Normal range of motion. Cervical back: Normal range of motion. No spinous process tenderness or muscular tenderness. Right knee: Normal.      Left knee: Swelling and bony tenderness present. Comments: Abrasion anterior inferior knee. Good range of motion. Distal neurovascular intact left lower   Lymphadenopathy:      Cervical: No cervical adenopathy. Right cervical: No superficial or deep cervical adenopathy. Left cervical: No superficial or deep cervical adenopathy. Skin:     General: Skin is warm and dry. Findings: No erythema or rash. Neurological:      Mental Status: He is alert and oriented to person, place, and time. Cranial Nerves: No cranial nerve deficit. Motor: No abnormal muscle tone. Coordination: Coordination normal.      Deep Tendon Reflexes: Reflexes are normal and symmetric. Reflexes normal.      Comments: Appropriate, no focal findings. Distal neurovascular intact left lower extremity   Psychiatric:         Behavior: Behavior normal.         Thought Content:  Thought content normal.         Judgment: Judgment normal.       DIAGNOSTIC RESULTS Labs: No results found for this visit on 08/17/22. IMAGING:  XR KNEE LEFT (MIN 4 VIEWS)   Final Result   1. No radiographic evidence of acute injury to the left knee. 2. Moderate to advanced tricompartment degenerative changes of the left    knee most pronounced within the medial compartment. Chondrocalcinosis. This document has been electronically signed by: Jacob Holcomb MD on    08/17/2022 07:32 PM        URGENT CARE COURSE:     Vitals:    08/17/22 1913   BP: (!) 153/75   Pulse: 80   Resp: 18   Temp: 98.7 °F (37.1 °C)   TempSrc: Temporal   SpO2: 96%   Weight: 255 lb (115.7 kg)       Medications - No data to display  PROCEDURES:  None  FINALIMPRESSION      1. Contusion of left knee and lower leg, initial encounter    2. Abrasion, left knee, initial encounter    3. Primary osteoarthritis of left knee    4. Fall from ground level        DISPOSITION/PLAN   DISPOSITION Decision To Discharge 08/17/2022 07:39:01 PM  Nontoxic, well-hydrated, normal airway. No radiographic evidence of fracture, dislocation. There is osteoarthritis and joint effusion of left knee. .  No infectious process. No neurovascular complication. No associated head, neck or back injury. Will treat with RICE treatment, Ace wrap, Tylenol. Patient to contact [facility] immediately for further advice regarding treatment and follow-up. Patient understands to go to ED if worse.   PATIENT REFERRED [facility]  [facility]        DISCHARGE MEDICATIONS:  Discharge Medication List as of 8/17/2022  7:42 PM        Discharge Medication List as of 8/17/2022  7:42 PM          MD Obey Vazquez MD  08/17/22 1900

## 2022-09-16 ENCOUNTER — OFFICE VISIT (OUTPATIENT)
Dept: UROLOGY | Age: 72
End: 2022-09-16
Payer: OTHER GOVERNMENT

## 2022-09-16 VITALS
HEART RATE: 78 BPM | BODY MASS INDEX: 36.08 KG/M2 | DIASTOLIC BLOOD PRESSURE: 70 MMHG | HEIGHT: 70 IN | SYSTOLIC BLOOD PRESSURE: 128 MMHG | WEIGHT: 252 LBS

## 2022-09-16 DIAGNOSIS — N99.115 POSTPROCEDURAL MALE FOSSA NAVICULARIS URETHRAL STRICTURE: ICD-10-CM

## 2022-09-16 DIAGNOSIS — N13.8 BPH WITH OBSTRUCTION/LOWER URINARY TRACT SYMPTOMS: Primary | ICD-10-CM

## 2022-09-16 DIAGNOSIS — N40.1 BPH WITH OBSTRUCTION/LOWER URINARY TRACT SYMPTOMS: Primary | ICD-10-CM

## 2022-09-16 LAB
BILIRUBIN URINE: NEGATIVE
BLOOD URINE, POC: ABNORMAL
CHARACTER, URINE: CLEAR
COLOR, URINE: YELLOW
GLUCOSE URINE: NEGATIVE MG/DL
KETONES, URINE: NEGATIVE
LEUKOCYTE CLUMPS, URINE: ABNORMAL
NITRITE, URINE: NEGATIVE
PH, URINE: 6 (ref 5–9)
POST VOID RESIDUAL (PVR): 54 ML
PROTEIN, URINE: NEGATIVE MG/DL
SPECIFIC GRAVITY, URINE: 1.02 (ref 1–1.03)
UROBILINOGEN, URINE: 0.2 EU/DL (ref 0–1)

## 2022-09-16 PROCEDURE — 51798 US URINE CAPACITY MEASURE: CPT | Performed by: UROLOGY

## 2022-09-16 PROCEDURE — 81003 URINALYSIS AUTO W/O SCOPE: CPT | Performed by: UROLOGY

## 2022-09-16 PROCEDURE — 99214 OFFICE O/P EST MOD 30 MIN: CPT | Performed by: UROLOGY

## 2022-09-16 PROCEDURE — 1123F ACP DISCUSS/DSCN MKR DOCD: CPT | Performed by: UROLOGY

## 2022-09-16 RX ORDER — MAGNESIUM OXIDE 400 MG/1
400 TABLET ORAL 2 TIMES DAILY
COMMUNITY

## 2022-09-16 NOTE — PROGRESS NOTES
MD MD [facility] Consultation / New Patient Visit    Patient:  Sarah Moon  YOB: 1950  Date: 9/16/2022  Consult requested from DIMA Matos CNP     HISTORY OF PRESENT ILLNESS:   The patient is a 67 y.o. male who presents today for follow-up for the following problem(s): BPH with LUTs  Overall the problem(s) : are worsening. Associated Symptoms: No dysuria, gross hematuria. Pain Severity:      Today visit:   9/16/22   Patient presents with BPH s/p Greenlight PVP (5/16/22) - PVR 8 ml. Symptoms improving, but has signs of a stricture. Summary of old records:   (Patient's old records, notes and chart reviewed and summarized above.)    Cystoscopy Operative Note  Surgeon: Brayan Alcantar MD   Anesthesia: Urethral 2%  Indications: BPH  Position: supine  Findings:   The patient was prepped and draped in the usual sterile fashion. The flexible cystoscope was advanced through the urethra and into the bladder. The bladder was thoroughly inspected and the following was noted:    Residual Urine: Moderate  Urethra: No abnormalities of the urethra are noted. Prostate: Medium to large gland (80 gm) Complete obstruction by lateral & small median lobe of prostate. Bladder: No tumors or CIS noted. No bladder diverticulum. Moderate  trabeculation noted. Ureters: Clear efflux from both ureters. Orifices with normal configuration and location. The cystoscope was removed. The patient tolerated the procedure well.   Plan  Good candidate for Greenlight vs Urolift (would need US prior)        Last several PSA's:  Lab Results   Component Value Date    PSA 0.73 09/22/2021       Last total testosterone:  No results found for: TESTOSTERONE    Urinalysis today:  Results for POC orders placed in visit on 09/16/22   POCT Urinalysis No Micro (Auto)   Result Value Ref Range    Glucose, Ur Negative NEGATIVE mg/dl    Bilirubin Urine Negative     Ketones, Urine Negative NEGATIVE    Specific Gravity, Urine 1.025 1.002 - 1.030    Blood, UA POC Large (A) NEGATIVE    pH, Urine 6.00 5.0 - 9.0    Protein, Urine Negative NEGATIVE mg/dl    Urobilinogen, Urine 0.20 0.0 - 1.0 eu/dl    Nitrite, Urine Negative NEGATIVE    Leukocyte Clumps, Urine Small (A) NEGATIVE    Color, Urine Yellow YELLOW-STRAW    Character, Urine Clear CLR-SL.CLOUD   poct post void residual   Result Value Ref Range    post void residual 54 ml         Last BUN and creatinine:  Lab Results   Component Value Date    BUN 16 05/03/2022     Lab Results   Component Value Date    CREATININE 0.7 05/03/2022       Imaging Reviewed during this Office Visit:   (results were independently reviewed by physician and radiology report verified)    PAST MEDICAL, FAMILY AND SOCIAL HISTORY:  Past Medical History:   Diagnosis Date    Arthritis     Back, knees    CAD (coronary artery disease)     CHF (congestive heart failure) ([facility])     \"CABG X5\"    COPD (chronic obstructive pulmonary disease) ([facility])     Hx of blood clots     Hyperlipidemia     Hypertension     PE (pulmonary embolism) 2003    Pneumonia     pneumonia in 2003     Past Surgical History:   Procedure Laterality Date    CARDIAC SURGERY      CABG X5    COLONOSCOPY      CORONARY ARTERY BYPASS GRAFT  10/2003    [facility]    ENDOSCOPY, COLON, DIAGNOSTIC      HEEL SPUR SURGERY Left 2007    HEEL SPUR SURGERY Right 2008    HYDROCELE EXCISION Right 1998    INGUINAL HERNIA REPAIR Right 1992    KNEE SURGERY  2016    right knee    SKIN CANCER EXCISION      pt states he had 2 skin spots removed from chest and left arm     TURP N/A 5/16/2022    CYSTOSCOPY, GREENLIGHT PHOTO VAPORIZATION OF THE PROSTATE performed by Guerline Polanco MD at [place] [address]  10/2003    cecilio filter     Family History   Problem Relation Age of Onset    Cancer Mother     High Blood Pressure Mother     Kidney Disease Mother     Heart Disease Father     Heart Disease Maternal Uncle      Outpatient Medications Marked as Taking for the 22 encounter (Office Visit) with Edson Luo MD   Medication Sig Dispense Refill    magnesium oxide (MAG-OX) 400 MG tablet Take 400 mg by mouth 2 times daily      dutasteride (AVODART) 0.5 MG capsule Take 1 capsule by mouth daily 90 capsule 4    atorvastatin (LIPITOR) 40 MG tablet Take 40 mg by mouth daily      amLODIPine (NORVASC) 10 MG tablet Take 10 mg by mouth daily      tiotropium (SPIRIVA RESPIMAT) 2.5 MCG/ACT AERS inhaler Inhale 2 puffs into the lungs daily      metFORMIN (GLUCOPHAGE) 1000 MG tablet Take 1,000 mg by mouth 2 times daily (with meals)      clopidogrel (PLAVIX) 75 MG tablet Take 75 mg by mouth daily      pantoprazole sodium (PROTONIX) 40 MG PACK packet Take 40 mg by mouth every morning (before breakfast)      aspirin 81 MG EC tablet Take 81 mg by mouth daily      albuterol-ipratropium (COMBIVENT RESPIMAT)  MCG/ACT AERS inhaler Inhale 1 puff into the lungs 4 times daily      losartan (COZAAR) 100 MG tablet Take 100 mg by mouth daily. Multiple Vitamins-Minerals (MULTIVITAMIN PO) Take  by mouth daily. Omega-3 Fatty Acids (FISH OIL PO) Take  by mouth daily. Glucosamine-Chondroitin (GLUCOSAMINE CHONDR COMPLEX PO) Take 1 tablet by mouth 2 times daily          Patient has no known allergies.   Social History     Tobacco Use   Smoking Status Former    Types: Cigarettes    Quit date: 1980    Years since quittin.7   Smokeless Tobacco Never       Social History     Substance and Sexual Activity   Alcohol Use Yes    Alcohol/week: 1.0 standard drink    Types: 1 Cans of beer per week    Comment: occasionally       REVIEW OF SYSTEMS:  Constitutional: negative  Eyes: negative  Respiratory: negative  Cardiovascular: negative  Gastrointestinal: negative  Musculoskeletal: negative  Genitourinary: negative  Skin: negative   Neurological: negative  Hematological/Lymphatic: negative  Psychological: negative    Physical Exam:    This a 67 y.o. male   Vitals:    09/16/22 1212   BP: 128/70   Pulse: 78     Constitutional: Patient in no acute distress   Neuro: alert and oriented to person place and time. Psych: Mood and affect normal.  Head: atraumatic normocephalic  Eyes: EOMi  HEENT: neck supple, trachea midline  Lungs: Respiratory effort normal  Cardiovascular:  Normal peripheral pulses  Abdomen: Soft, non-tender, non-distended, No CVA  Bladder: non-tender and not distended. FROMx4, no cyanosis clubbing edema  Skin: warm and dry      Assessment and Plan      1. BPH with obstruction/lower urinary tract symptoms    2. 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 BPH with LUTs - controlled after Greenlight PVP  - will start dutasteride to prevent regrowth.   - Cystoscopy with urethral dilation

## 2022-09-18 LAB
ORGANISM: ABNORMAL
URINE CULTURE, ROUTINE: ABNORMAL

## 2022-10-10 ENCOUNTER — TELEPHONE (OUTPATIENT)
Dept: UROLOGY | Age: 72
End: 2022-10-10

## 2022-10-10 DIAGNOSIS — N40.1 BPH WITH OBSTRUCTION/LOWER URINARY TRACT SYMPTOMS: Primary | ICD-10-CM

## 2022-10-10 DIAGNOSIS — N13.8 BPH WITH OBSTRUCTION/LOWER URINARY TRACT SYMPTOMS: Primary | ICD-10-CM

## 2022-10-10 DIAGNOSIS — Z01.818 PRE-OP TESTING: ICD-10-CM

## 2022-10-10 NOTE — TELEPHONE ENCOUNTER
CARDIAC CLEARANCE FORM    Clearance From  Dr Darek Gardner     Appointment Date    Time       Stephenie Spaulding  1950  Surgeon:  Dr Jake Melton    Procedure:  Cystoscopy with urethral dilation   Date:  11/21/22  Facility: [facility] HISTORY  DM CAD PVD CVA DVT/PE MI CHFMalignant Hyperthemia HTN Tobacco/ETOH Sleep Apnea GERD Hyperlipidemia Renal Insufficiency COPD/Asthma Bleeding Disorder Pacemaker/AICD  II. CURRENT MEDICATIONS: Attach list or complete     Pt is on following meds that need special instructions for surgery:  Anticoagulants Heart Meds ASA Insulin Oral anti-diabetics NSAIDS Diuretics     K replacements  III. ALLERGIES:   IV.  FUNCTIONAL CAPACITY  >4 METS (CAN VACUUM/HOUSEWORK,CLIMB FLIGHT STAIRS WITHOUT DYSPNEA)  <4METS (FLIGHT OF STEPS CAUSES DYSPNEA/CARDIAC SYMPTOMS)   Stress Test Recommended:    Stress tests or Cardiac Cath in last 5 years:  Yes (attach report)  No   Results: WNL   ABN  Any Change in Cardiac symptoms: Yes  NO  Comments:    Revascularization in last 5 years: Yes  NO  CABG: Yes  No   Comments:     Stents:  Date: ________  Any change in cardiac symptoms  Yes  NO  Comments:   V.  REVIEW OF SYSTEMS:  (Pertinent positive or negative)    VI. PHYSICIAL EXAM  HEENT:1.  Dentations   Good Poor          HT:_______ WT:______      2. Neck Pathology: Rheumatoid DDD C-spine  BP:______ P:________  PULMONARY:  CARDIAC  ABDOMEN  EXTREMITIES  OTHER  VII.   Testing Ordered by Surgeon  Reviewed by Clearance Physician  Test      Result  Plan,if Abnormal  ___CBS     WNL  ABN____________________  ___BMP/BUN/CR    WNL  ABN____________________  ___K+      WNL  ABN____________________  ___UA      WNL  ABN____________________  ___CXR     WNL  ABN____________________  ___EKG     WNL  ABN____________________  ___MRSA     WNL  ABN____________________  VIII.  ___Acceptable risk for surgery ___Risk Unacceptable-Communication to Follow Comments:_____________________________________________________  ________________________________________________________________________  Physician ___________________________  Date:_______________  Physician Printed Name:  _________________________    Alfonso Dugger  [phone]

## 2022-10-10 NOTE — TELEPHONE ENCOUNTER
Patient is scheduled for surgery on 11/21/22 with Dr Cherry Richardson. Surgery consent on arrival. Patient to do pre op testing on 11/7/22. Dr Arnel Luque to clear. Surgery instructions gone over verbally and mailed to patient. Patient will have an adult over the age of 25 with them at discharge and 24 hours after procedure.

## 2022-10-10 NOTE — TELEPHONE ENCOUNTER
SURGERY [address]  [address] [place] [address] [address] [facility] [address] [address], [address]      Phone *[phone] *[phone]   Surgical Scheduling Direct Line Phone *[phone] Fax *[phone]      Lanre Vivas 1950 male    [address] [address]   Marital Status:          Home Phone: [phone]      Cell Phone:    Telephone Information:   Mobile [phone]          Surgeon: Dr. 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 urethral stricture     Important Medical History:  In Epic    Special Inst/Equip:     CPT Codes:    13895  Latex Allergy: No     Cardiac Device:  No    Anesthesia:  General          Admission Type:  Same Day                        Admit Prior to Day of Surgery: No    Case Location:  Main OR            Preadmission Testing:  Phone Call          PAT Date and Time:______________________________________________________    PAT Confirmation #: ______________________________________________________    Post Op Visit: ___________________________________________________________    Need Preop Cardiac Clearance: Yes    Does Patient have Cardiologist/physician?      Dr Estefania Ray Confirmation #: __________________________________________________    [address]: ________________________   Date: __________________________     [employer] Name: [place]

## 2022-10-10 NOTE — TELEPHONE ENCOUNTER
DO NOT TAKE  FISH OIL, MOBIC,COUMADIN, IBUPROFEN, MOTRIN-LIKE DRUGS AND ANY MULTIVITAMINS OR OVER THE COUNTER SUPPLEMENTS 14 DAYS PRIOR TO SURGERY. MUST HAVE AN ADULT OVER THE AGE OF 18 WITH YOU AT THE TIME OF THE DISCHARGE AND WITH YOU AT HOME AFTER THE PROCEDURE FOR 24 HOURS     **HOLD ASPIRIN AND PLAVIX AS DR Kaiser Conway Giselle English     Christian Deals 1950 Diagnosis:     Surgical Physician: Dr. Mirta Avila have been scheduled for the procedure marked below:      Surgery: Cystoscopy with urethral dilation         Date: 11/21/22     Anesthesia: Anesthesiologist (General/Spinal)     Place of Service: [address] [place] [address] Second Floor Same Day Surgery         Arrive to same day surgery by:  6:00 AM  (Surgery time is subject to change)      INSTRUCTIONS AS MARKED BELOW:    1.  DO NOT eat or drink anything after midnight before surgery. 2.  We prefer you shower or bathe with an antibacterial soap (Dial) the morning of surgery. 3  Please bring a current medication list, photo ID and insurance card(s) with you  4. Okay to take Tylenol  5. If you take Glucophage, Metformin or Janumet, hold 48-hours prior to surgery  6  Take blood pressure or heart medication as directed, if taken in the morning take with a small sip of water  7. The office will call you in 1-2 days after your procedure to schedule a follow up. DATE SENSITIVE TESTING-DO ON THE DATE LISTED *WALK IN *NO APPOINTMENT    DO PRE OP FASTING LABS AND URINE CULTURE ON 11/7/22. ORDERS INCLUDED.         Date: 10/10/2022

## 2022-10-25 ENCOUNTER — NURSE ONLY (OUTPATIENT)
Dept: LAB | Age: 72
End: 2022-10-25

## 2022-10-25 DIAGNOSIS — N40.1 BPH WITH OBSTRUCTION/LOWER URINARY TRACT SYMPTOMS: ICD-10-CM

## 2022-10-25 DIAGNOSIS — N13.8 BPH WITH OBSTRUCTION/LOWER URINARY TRACT SYMPTOMS: ICD-10-CM

## 2022-10-25 DIAGNOSIS — Z01.818 PRE-OP TESTING: ICD-10-CM

## 2022-10-25 LAB
ANION GAP SERPL CALCULATED.3IONS-SCNC: 15 MEQ/L (ref 8–16)
BACTERIA: ABNORMAL /HPF
BASOPHILS # BLD: 0.7 %
BASOPHILS ABSOLUTE: 0 THOU/MM3 (ref 0–0.1)
BILIRUBIN URINE: NEGATIVE
BLOOD, URINE: ABNORMAL
BUN BLDV-MCNC: 14 MG/DL (ref 7–22)
CALCIUM SERPL-MCNC: 9.8 MG/DL (ref 8.5–10.5)
CASTS 2: ABNORMAL /LPF
CASTS UA: ABNORMAL /LPF
CHARACTER, URINE: CLEAR
CHLORIDE BLD-SCNC: 101 MEQ/L (ref 98–111)
CO2: 23 MEQ/L (ref 23–33)
COLOR: YELLOW
CREAT SERPL-MCNC: 0.8 MG/DL (ref 0.4–1.2)
CRYSTALS, UA: ABNORMAL
EOSINOPHIL # BLD: 1.6 %
EOSINOPHILS ABSOLUTE: 0.1 THOU/MM3 (ref 0–0.4)
EPITHELIAL CELLS, UA: ABNORMAL /HPF
ERYTHROCYTE [DISTWIDTH] IN BLOOD BY AUTOMATED COUNT: 12.4 % (ref 11.5–14.5)
ERYTHROCYTE [DISTWIDTH] IN BLOOD BY AUTOMATED COUNT: 39.7 FL (ref 35–45)
GFR SERPL CREATININE-BSD FRML MDRD: > 60 ML/MIN/1.73M2
GLUCOSE BLD-MCNC: 148 MG/DL (ref 70–108)
GLUCOSE URINE: NEGATIVE MG/DL
HCT VFR BLD CALC: 37.8 % (ref 42–52)
HEMOGLOBIN: 13.6 GM/DL (ref 14–18)
IMMATURE GRANS (ABS): 0.02 THOU/MM3 (ref 0–0.07)
IMMATURE GRANULOCYTES: 0.3 %
KETONES, URINE: NEGATIVE
LEUKOCYTE ESTERASE, URINE: ABNORMAL
LYMPHOCYTES # BLD: 16.8 %
LYMPHOCYTES ABSOLUTE: 1.1 THOU/MM3 (ref 1–4.8)
MCH RBC QN AUTO: 31.8 PG (ref 26–33)
MCHC RBC AUTO-ENTMCNC: 36 GM/DL (ref 32.2–35.5)
MCV RBC AUTO: 88.3 FL (ref 80–94)
MISCELLANEOUS 2: ABNORMAL
MONOCYTES # BLD: 9.3 %
MONOCYTES ABSOLUTE: 0.6 THOU/MM3 (ref 0.4–1.3)
NITRITE, URINE: NEGATIVE
NUCLEATED RED BLOOD CELLS: 0 /100 WBC
PH UA: 7.5 (ref 5–9)
PLATELET # BLD: 156 THOU/MM3 (ref 130–400)
PMV BLD AUTO: 10 FL (ref 9.4–12.4)
POTASSIUM SERPL-SCNC: 4.4 MEQ/L (ref 3.5–5.2)
PROTEIN UA: NEGATIVE
RBC # BLD: 4.28 MILL/MM3 (ref 4.7–6.1)
RBC URINE: ABNORMAL /HPF
RENAL EPITHELIAL, UA: ABNORMAL
SEG NEUTROPHILS: 71.3 %
SEGMENTED NEUTROPHILS ABSOLUTE COUNT: 4.8 THOU/MM3 (ref 1.8–7.7)
SODIUM BLD-SCNC: 139 MEQ/L (ref 135–145)
SPECIFIC GRAVITY, URINE: 1.01 (ref 1–1.03)
UROBILINOGEN, URINE: 1 EU/DL (ref 0–1)
WBC # BLD: 6.8 THOU/MM3 (ref 4.8–10.8)
WBC UA: ABNORMAL /HPF
YEAST: ABNORMAL

## 2022-10-27 ENCOUNTER — TELEPHONE (OUTPATIENT)
Dept: UROLOGY | Age: 72
End: 2022-10-27

## 2022-10-27 DIAGNOSIS — R31.9 HEMATURIA, UNSPECIFIED TYPE: ICD-10-CM

## 2022-10-27 DIAGNOSIS — R31.0 GROSS HEMATURIA: Primary | ICD-10-CM

## 2022-10-27 NOTE — TELEPHONE ENCOUNTER
Patient called wanting lab results reviewed for upcoming surgery. Micro UA completed without a culture, does he need a culture completed. Patient is going out of town on Nov 1st if antibiotics needed ordered.

## 2022-10-28 ENCOUNTER — PATIENT MESSAGE (OUTPATIENT)
Dept: UROLOGY | Age: 72
End: 2022-10-28

## 2022-10-28 DIAGNOSIS — R31.9 HEMATURIA, UNSPECIFIED TYPE: Primary | ICD-10-CM

## 2022-10-29 LAB — URINE CULTURE, ROUTINE: NORMAL

## 2022-10-31 NOTE — TELEPHONE ENCOUNTER
From: Noni Ny  To: Dr. Senthil Boyd: 10/28/2022 1:32 PM EDT  Subject: Blood in urine    Two of my previous tests shows a large amount of blood in my urine. When I talked to the nurse about it she said to wait and talk to you on the date of my surgery. I just feel if I have some kind of infection we probably shouldn't wait another 3 weeks before doing something. Could you please let me know your thoughts.     Thank you,    Kelly Meadows  July 27, 1950

## 2022-10-31 NOTE — TELEPHONE ENCOUNTER
Last urine culture on 10/25/2022 was negative. He is scheduled for cystoscopy urethral dilation on 11/21/2022. Do you want an urine repeated prior to surgery.

## 2022-11-04 ENCOUNTER — PREP FOR PROCEDURE (OUTPATIENT)
Dept: UROLOGY | Age: 72
End: 2022-11-04

## 2022-11-05 RX ORDER — SODIUM CHLORIDE 9 MG/ML
INJECTION, SOLUTION INTRAVENOUS PRN
Status: CANCELLED | OUTPATIENT
Start: 2022-11-05

## 2022-11-05 RX ORDER — SODIUM CHLORIDE 0.9 % (FLUSH) 0.9 %
5-40 SYRINGE (ML) INJECTION EVERY 12 HOURS SCHEDULED
Status: CANCELLED | OUTPATIENT
Start: 2022-11-05

## 2022-11-05 RX ORDER — SODIUM CHLORIDE 0.9 % (FLUSH) 0.9 %
5-40 SYRINGE (ML) INJECTION PRN
Status: CANCELLED | OUTPATIENT
Start: 2022-11-05

## 2022-11-10 NOTE — FLOWSHEET NOTE
Follow all instructions given by your physician    NPO after midnight   Sips of water am of surgery with allowed medications  Bring insurance info and 's license  Wear comfortable clean, loose fitting clothing  No jewelry or contact lenses to be worn day of surgery  No glue on dentures morning of surgery;you will be asked to remove them for surgery. Case for glasses. Shower night before and morning of surgery with a liquid antibacterial soap, dry with fresh clean towel; no lotions, creams or powder. Clean sheets and pillow case on bed night before surgery  Bring medications in original bottles  Bring CPAP/BIPAP machine if you have one ( you may be charged if one is needed in recovery room )   needed at discharge and someone over 18 to stay with you for 24 hours overnight (surgery may be cancelled if you don't have this)  Report to [facility] on 2nd floor  If you would become ill prior to surgery, please call the surgeon  May have a visitor with you, we request that you limit to 2 visitors in pre-op area    Call [phone] for any questions  Covid questionnaire Complete; Patient negative for symptoms or exposure. See documentation.

## 2022-11-11 ENCOUNTER — TELEPHONE (OUTPATIENT)
Dept: UROLOGY | Age: 72
End: 2022-11-11

## 2022-11-14 NOTE — TELEPHONE ENCOUNTER
Received UC from [address] [place] [address] in [facility], Routed to Ajit Powell and Veronica Bermudez

## 2022-11-14 NOTE — TELEPHONE ENCOUNTER
Requested Pre-Op Urine from [facility] # [phone]   Fax # [phone]

## 2022-11-21 ENCOUNTER — ANESTHESIA EVENT (OUTPATIENT)
Dept: OPERATING ROOM | Age: 72
End: 2022-11-21
Payer: OTHER GOVERNMENT

## 2022-11-21 ENCOUNTER — ANESTHESIA (OUTPATIENT)
Dept: OPERATING ROOM | Age: 72
End: 2022-11-21
Payer: OTHER GOVERNMENT

## 2022-11-21 ENCOUNTER — HOSPITAL ENCOUNTER (OUTPATIENT)
Age: 72
Setting detail: OUTPATIENT SURGERY
Discharge: HOME | End: 2022-11-21
Attending: UROLOGY | Admitting: UROLOGY
Payer: OTHER GOVERNMENT

## 2022-11-21 VITALS
RESPIRATION RATE: 16 BRPM | HEART RATE: 57 BPM | WEIGHT: 268 LBS | DIASTOLIC BLOOD PRESSURE: 75 MMHG | SYSTOLIC BLOOD PRESSURE: 123 MMHG | OXYGEN SATURATION: 97 % | HEIGHT: 70 IN | BODY MASS INDEX: 38.37 KG/M2 | TEMPERATURE: 97.1 F

## 2022-11-21 LAB
GLUCOSE BLD-MCNC: 161 MG/DL (ref 70–108)
INR BLD: 1.04 (ref 0.85–1.13)

## 2022-11-21 PROCEDURE — 6360000002 HC RX W HCPCS: Performed by: NURSE ANESTHETIST, CERTIFIED REGISTERED

## 2022-11-21 PROCEDURE — 3600000012 HC SURGERY LEVEL 2 ADDTL 15MIN: Performed by: UROLOGY

## 2022-11-21 PROCEDURE — 2709999900 HC NON-CHARGEABLE SUPPLY: Performed by: UROLOGY

## 2022-11-21 PROCEDURE — 7100000010 HC PHASE II RECOVERY - FIRST 15 MIN: Performed by: UROLOGY

## 2022-11-21 PROCEDURE — 2580000003 HC RX 258: Performed by: UROLOGY

## 2022-11-21 PROCEDURE — 2720000010 HC SURG SUPPLY STERILE: Performed by: UROLOGY

## 2022-11-21 PROCEDURE — 2580000003 HC RX 258: Performed by: NURSE ANESTHETIST, CERTIFIED REGISTERED

## 2022-11-21 PROCEDURE — 82948 REAGENT STRIP/BLOOD GLUCOSE: CPT

## 2022-11-21 PROCEDURE — 2500000003 HC RX 250 WO HCPCS: Performed by: NURSE ANESTHETIST, CERTIFIED REGISTERED

## 2022-11-21 PROCEDURE — 36415 COLL VENOUS BLD VENIPUNCTURE: CPT

## 2022-11-21 PROCEDURE — 3700000000 HC ANESTHESIA ATTENDED CARE: Performed by: UROLOGY

## 2022-11-21 PROCEDURE — 3600000002 HC SURGERY LEVEL 2 BASE: Performed by: UROLOGY

## 2022-11-21 PROCEDURE — 85610 PROTHROMBIN TIME: CPT

## 2022-11-21 PROCEDURE — 7100000011 HC PHASE II RECOVERY - ADDTL 15 MIN: Performed by: UROLOGY

## 2022-11-21 PROCEDURE — 7100000001 HC PACU RECOVERY - ADDTL 15 MIN: Performed by: UROLOGY

## 2022-11-21 PROCEDURE — C1769 GUIDE WIRE: HCPCS | Performed by: UROLOGY

## 2022-11-21 PROCEDURE — 3700000001 HC ADD 15 MINUTES (ANESTHESIA): Performed by: UROLOGY

## 2022-11-21 PROCEDURE — 7100000000 HC PACU RECOVERY - FIRST 15 MIN: Performed by: UROLOGY

## 2022-11-21 RX ORDER — PROPOFOL 10 MG/ML
INJECTION, EMULSION INTRAVENOUS PRN
Status: DISCONTINUED | OUTPATIENT
Start: 2022-11-21 | End: 2022-11-21 | Stop reason: SDUPTHER

## 2022-11-21 RX ORDER — FENTANYL CITRATE 50 UG/ML
INJECTION, SOLUTION INTRAMUSCULAR; INTRAVENOUS PRN
Status: DISCONTINUED | OUTPATIENT
Start: 2022-11-21 | End: 2022-11-21 | Stop reason: SDUPTHER

## 2022-11-21 RX ORDER — SODIUM CHLORIDE 0.9 % (FLUSH) 0.9 %
5-40 SYRINGE (ML) INJECTION EVERY 12 HOURS SCHEDULED
Status: DISCONTINUED | OUTPATIENT
Start: 2022-11-21 | End: 2022-11-21 | Stop reason: HOSPADM

## 2022-11-21 RX ORDER — SODIUM CHLORIDE 0.9 % (FLUSH) 0.9 %
5-40 SYRINGE (ML) INJECTION PRN
Status: DISCONTINUED | OUTPATIENT
Start: 2022-11-21 | End: 2022-11-21 | Stop reason: HOSPADM

## 2022-11-21 RX ORDER — PHENAZOPYRIDINE HYDROCHLORIDE 100 MG/1
100 TABLET, FILM COATED ORAL 3 TIMES DAILY PRN
Qty: 21 TABLET | Refills: 0 | Status: SHIPPED | OUTPATIENT
Start: 2022-11-21 | End: 2022-11-28

## 2022-11-21 RX ORDER — SODIUM CHLORIDE 9 MG/ML
INJECTION, SOLUTION INTRAVENOUS CONTINUOUS PRN
Status: DISCONTINUED | OUTPATIENT
Start: 2022-11-21 | End: 2022-11-21 | Stop reason: SDUPTHER

## 2022-11-21 RX ORDER — DOXYCYCLINE HYCLATE 100 MG
100 TABLET ORAL 2 TIMES DAILY
Qty: 10 TABLET | Refills: 0 | Status: SHIPPED | OUTPATIENT
Start: 2022-11-21 | End: 2022-11-26

## 2022-11-21 RX ORDER — LIDOCAINE HYDROCHLORIDE 20 MG/ML
INJECTION, SOLUTION EPIDURAL; INFILTRATION; INTRACAUDAL; PERINEURAL PRN
Status: DISCONTINUED | OUTPATIENT
Start: 2022-11-21 | End: 2022-11-21 | Stop reason: SDUPTHER

## 2022-11-21 RX ORDER — SODIUM CHLORIDE 9 MG/ML
INJECTION, SOLUTION INTRAVENOUS PRN
Status: DISCONTINUED | OUTPATIENT
Start: 2022-11-21 | End: 2022-11-21 | Stop reason: HOSPADM

## 2022-11-21 RX ADMIN — SODIUM CHLORIDE: 9 INJECTION, SOLUTION INTRAVENOUS at 06:13

## 2022-11-21 RX ADMIN — FENTANYL CITRATE 25 MCG: 50 INJECTION, SOLUTION INTRAMUSCULAR; INTRAVENOUS at 08:33

## 2022-11-21 RX ADMIN — FENTANYL CITRATE 25 MCG: 50 INJECTION, SOLUTION INTRAMUSCULAR; INTRAVENOUS at 08:40

## 2022-11-21 RX ADMIN — LIDOCAINE HYDROCHLORIDE 100 MG: 20 INJECTION, SOLUTION EPIDURAL; INFILTRATION; INTRACAUDAL; PERINEURAL at 08:30

## 2022-11-21 RX ADMIN — CEFAZOLIN 2000 MG: 10 INJECTION, POWDER, FOR SOLUTION INTRAVENOUS at 08:34

## 2022-11-21 RX ADMIN — SODIUM CHLORIDE: 9 INJECTION, SOLUTION INTRAVENOUS at 08:27

## 2022-11-21 RX ADMIN — PROPOFOL 200 MG: 10 INJECTION, EMULSION INTRAVENOUS at 08:30

## 2022-11-21 ASSESSMENT — PAIN SCALES - GENERAL
PAINLEVEL_OUTOF10: 0

## 2022-11-21 ASSESSMENT — PAIN - FUNCTIONAL ASSESSMENT: PAIN_FUNCTIONAL_ASSESSMENT: 0-10

## 2022-11-21 NOTE — ANESTHESIA PRE PROCEDURE
Department of Anesthesiology  Preprocedure Note       Name:  Víctor Urbina   Age:  67 y.o.  :  1950                                          MRN:  [id number]         Date:  2022      Surgeon: Mireya Jacob):  Yeni Ferreira MD    Procedure: Procedure(s):  CYSTOSCOPY URETHRAL DILATATION    Medications prior to admission:   Prior to Admission medications    Medication Sig Start Date End Date Taking? Authorizing Provider   magnesium oxide (MAG-OX) 400 MG tablet Take 400 mg by mouth 2 times daily    Historical Provider, MD   dutasteride (AVODART) 0.5 MG capsule Take 1 capsule by mouth daily 22   Yeni Ferreira MD   atorvastatin (LIPITOR) 40 MG tablet Take 40 mg by mouth daily    Historical Provider, MD   amLODIPine (NORVASC) 10 MG tablet Take 10 mg by mouth daily    Historical Provider, MD   tiotropium (SPIRIVA RESPIMAT) 2.5 MCG/ACT AERS inhaler Inhale 2 puffs into the lungs daily    Historical Provider, MD   metFORMIN (GLUCOPHAGE) 1000 MG tablet Take 1,000 mg by mouth 2 times daily (with meals)    Historical Provider, MD   clopidogrel (PLAVIX) 75 MG tablet Take 75 mg by mouth daily    Historical Provider, MD   pantoprazole sodium (PROTONIX) 40 MG PACK packet Take 40 mg by mouth every morning (before breakfast)    Historical Provider, MD   aspirin 81 MG EC tablet Take 81 mg by mouth daily    Historical Provider, MD   nitroGLYCERIN (NITROSTAT) 0.4 MG SL tablet Place 0.4 mg under the tongue every 5 minutes as needed for Chest pain up to max of 3 total doses. If no relief after 1 dose, call 911. Patient not taking: No sig reported    Historical Provider, MD   albuterol-ipratropium (COMBIVENT RESPIMAT)  MCG/ACT AERS inhaler Inhale 1 puff into the lungs 4 times daily    Historical Provider, MD   losartan (COZAAR) 100 MG tablet Take 100 mg by mouth daily. Historical Provider, MD   Multiple Vitamins-Minerals (MULTIVITAMIN PO) Take  by mouth daily.     Historical Provider, MD   Omega-3 Fatty Acids (FISH OIL PO) Take  by mouth daily.     Historical Provider, MD   Glucosamine-Chondroitin (GLUCOSAMINE CHONDR COMPLEX PO) Take 1 tablet by mouth 2 times daily     Historical Provider, MD       Current medications:    Current Facility-Administered Medications   Medication Dose Route Frequency Provider Last Rate Last Admin    sodium chloride flush 0.9 % injection 5-40 mL  5-40 mL IntraVENous 2 times per day Abby Lawton MD        sodium chloride flush 0.9 % injection 5-40 mL  5-40 mL IntraVENous PRN Abby Lawton MD        0.9 % sodium chloride infusion   IntraVENous PRN Abby Lawton  mL/hr at 11/21/22 0613 New Bag at 11/21/22 0613    ceFAZolin (ANCEF) 2000 mg in dextrose 5 % 50 mL IVPB  2,000 mg IntraVENous On Call to 39 Gaby Chaves MD           Allergies:  No Known Allergies    Problem List:    Patient Active Problem List   Diagnosis Code    Chest pain R07.9       Past Medical History:        Diagnosis Date    Arthritis     Back, knees    CAD (coronary artery disease)     CHF (congestive heart failure) ([facility]     \"CABG X5\"    COPD (chronic obstructive pulmonary disease) ([facility]     Hx of blood clots     Hyperlipidemia     Hypertension     PE (pulmonary embolism) 2003    Pneumonia     pneumonia in 2003       Past Surgical History:        Procedure Laterality Date    CARDIAC SURGERY      CABG X5    COLONOSCOPY      CORONARY ARTERY BYPASS GRAFT  10/2003    [facility]    ENDOSCOPY, COLON, DIAGNOSTIC      HEEL SPUR SURGERY Left 2007    HEEL SPUR SURGERY Right 2008    HYDROCELE EXCISION Right 1998    INGUINAL HERNIA REPAIR Right 1992    KNEE SURGERY  2016    right knee    SKIN CANCER EXCISION      pt states he had 2 skin spots removed from chest and left arm     TURP N/A 5/16/2022    CYSTOSCOPY, GREENLIGHT PHOTO VAPORIZATION OF THE PROSTATE performed by Abby Lawton MD at [address] [place] [address]  10/2003    cceilio filter Social History:    Social History     Tobacco Use    Smoking status: Former     Types: Cigarettes     Quit date: 1980     Years since quittin.9    Smokeless tobacco: Never   Substance Use Topics    Alcohol use: Yes     Alcohol/week: 1.0 standard drink     Types: 1 Cans of beer per week     Comment: occasionally                                Counseling given: Not Answered      Vital Signs (Current):   Vitals:    11/10/22 1603 22 0550 22 0553   BP:  138/79    Pulse:  58    Resp:  16    Temp:  97.6 °F (36.4 °C)    SpO2:  95%    Weight: 260 lb (117.9 kg)  268 lb (121.6 kg)   Height: 5' 10\" (1.778 m)  5' 10\" (1.778 m)                                              BP Readings from Last 3 Encounters:   22 138/79   22 128/70   22 (!) 153/75       NPO Status: Time of last liquid consumption: 430                        Time of last solid consumption:                         Date of last liquid consumption: 22                        Date of last solid food consumption: 22    BMI:   Wt Readings from Last 3 Encounters:   22 268 lb (121.6 kg)   22 252 lb (114.3 kg)   22 255 lb (115.7 kg)     Body mass index is 38.45 kg/m².     CBC:   Lab Results   Component Value Date/Time    WBC 6.8 10/25/2022 09:00 AM    RBC 4.28 10/25/2022 09:00 AM    HGB 13.6 10/25/2022 09:00 AM    HCT 37.8 10/25/2022 09:00 AM    MCV 88.3 10/25/2022 09:00 AM    RDW 12.9 2015 09:34 AM     10/25/2022 09:00 AM       CMP:   Lab Results   Component Value Date/Time     10/25/2022 09:00 AM    K 4.4 10/25/2022 09:00 AM     10/25/2022 09:00 AM    CO2 23 10/25/2022 09:00 AM    BUN 14 10/25/2022 09:00 AM    CREATININE 0.8 10/25/2022 09:00 AM    LABGLOM >60 10/25/2022 08:59 AM    GLUCOSE 148 10/25/2022 09:00 AM    PROT 7.2 2021 09:36 AM    CALCIUM 9.8 10/25/2022 09:00 AM    BILITOT 3.1 2022 12:00 AM    ALKPHOS 59 2022 12:00 AM    AST 39 2022 12:00 AM    ALT 42 04/25/2022 12:00 AM       POC Tests:   Recent Labs     11/21/22  0616   POCGLU 161*       Coags:   Lab Results   Component Value Date/Time    INR 1.09 05/16/2022 10:29 AM       HCG (If Applicable): No results found for: PREGTESTUR, PREGSERUM, HCG, HCGQUANT     ABGs: No results found for: PHART, PO2ART, YLV8RDC, SNJ3WJV, BEART, U1TARWRO     Type & Screen (If Applicable):  No results found for: LABABO, LABRH    Drug/Infectious Status (If Applicable):  No results found for: HIV, HEPCAB    COVID-19 Screening (If Applicable): No results found for: COVID19        Anesthesia Evaluation  Patient summary reviewed no history of anesthetic complications:   Airway: Mallampati: II  TM distance: >3 FB   Neck ROM: full  Mouth opening: > = 3 FB   Dental: normal exam         Pulmonary:   (+) COPD:  decreased breath sounds                            Cardiovascular:    (+) hypertension:, CAD:, CABG/stent:, CHF:,                   Neuro/Psych:               GI/Hepatic/Renal:             Endo/Other:                     Abdominal:   (+) obese,           Vascular:   + PE. Other Findings:           Anesthesia Plan      general     ASA 3       Induction: intravenous. MIPS: Postoperative opioids intended and Prophylactic antiemetics administered. Anesthetic plan and risks discussed with patient and spouse.       Plan discussed with CRNA and surgical team.                    Minal Stokes MD   11/21/2022

## 2022-11-21 NOTE — ANESTHESIA POSTPROCEDURE EVALUATION
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 urethral stricture [N99.115])    Surgeons: Radha Burgos MD Responsible Provider: Matt Simmons MD    Anesthesia Type: General ASA Status: 3          Anesthesia Type: General    Justin Phase I: Justin Score: 10    Justin Phase II: Justin Score: 10      Anesthesia Post Evaluation    Patient location during evaluation: PACU  Patient participation: complete - patient participated  Level of consciousness: awake and alert  Airway patency: patent  Nausea & Vomiting: no nausea and no vomiting  Complications: no  Cardiovascular status: hemodynamically stable  Respiratory status: acceptable  Hydration status: euvolemic

## 2022-11-21 NOTE — H&P
[facility]  Urology H&P Note     Patient:  Pauly Wolfe  MRN: [id number]  YOB: 1950    ATTENDING: Moe Brar MD     CHIEF COMPLAINT:  urethral stricture    HISTORY OF PRESENT ILLNESS:   The patient is a 67 y.o. male who presents with urethral stricture    Patient's old records, notes and chart reviewed and summarized above. Past Medical History:    Past Medical History:   Diagnosis Date    Arthritis     Back, knees    CAD (coronary artery disease)     CHF (congestive heart failure) ([facility])     \"CABG X5\"    COPD (chronic obstructive pulmonary disease) ([facility])     Hx of blood clots     Hyperlipidemia     Hypertension     PE (pulmonary embolism) 2003    Pneumonia     pneumonia in 2003       Past Surgical History:    Past Surgical History:   Procedure Laterality Date    CARDIAC SURGERY      CABG X5    COLONOSCOPY      [facility]    ENDOSCOPY, COLON, DIAGNOSTIC      HEEL SPUR SURGERY Left 2007    HEEL SPUR SURGERY Right 2008    HYDROCELE EXCISION Right 1998    INGUINAL HERNIA REPAIR Right 1992    KNEE SURGERY  2016    right knee    SKIN CANCER EXCISION      pt states he had 2 skin spots removed from chest and left arm     TURP N/A 5/16/2022    CYSTOSCOPY, GREENLIGHT PHOTO VAPORIZATION OF THE PROSTATE performed by Sav Richmond MD at [address] [place] [address]  10/2003    [employer] filter       Medications Prior to Admission:   Prior to Admission medications    Medication Sig Start Date End Date Taking?  Authorizing Provider   magnesium oxide (MAG-OX) 400 MG tablet Take 400 mg by mouth 2 times daily    Historical Provider, MD   dutasteride (AVODART) 0.5 MG capsule Take 1 capsule by mouth daily 6/17/22   Sav Richmond MD   atorvastatin (LIPITOR) 40 MG tablet Take 40 mg by mouth daily    Historical Provider, MD   amLODIPine (NORVASC) 10 MG tablet Take 10 mg by mouth daily    Historical Provider, MD   tiotropium (SPIRIVA RESPIMAT) 2.5 MCG/ACT AERS inhaler Inhale 2 puffs into the lungs daily    Historical Provider, MD   metFORMIN (GLUCOPHAGE) 1000 MG tablet Take 1,000 mg by mouth 2 times daily (with meals)    Historical Provider, MD   clopidogrel (PLAVIX) 75 MG tablet Take 75 mg by mouth daily    Historical Provider, MD   pantoprazole sodium (PROTONIX) 40 MG PACK packet Take 40 mg by mouth every morning (before breakfast)    Historical Provider, MD   aspirin 81 MG EC tablet Take 81 mg by mouth daily    Historical Provider, MD   nitroGLYCERIN (NITROSTAT) 0.4 MG SL tablet Place 0.4 mg under the tongue every 5 minutes as needed for Chest pain up to max of 3 total doses. If no relief after 1 dose, call 911. Patient not taking: No sig reported    Historical Provider, MD   albuterol-ipratropium (COMBIVENT RESPIMAT)  MCG/ACT AERS inhaler Inhale 1 puff into the lungs 4 times daily    Historical Provider, MD   losartan (COZAAR) 100 MG tablet Take 100 mg by mouth daily. Historical Provider, MD   Multiple Vitamins-Minerals (MULTIVITAMIN PO) Take  by mouth daily. Historical Provider, MD   Omega-3 Fatty Acids (FISH OIL PO) Take  by mouth daily. Historical Provider, MD   Glucosamine-Chondroitin (GLUCOSAMINE CHONDR COMPLEX PO) Take 1 tablet by mouth 2 times daily     Historical Provider, MD       Allergies:  Patient has no known allergies. Social History:    Social History     Socioeconomic History    Marital status:      Spouse name: Not on file    Number of children: Not on file    Years of education: Not on file    Highest education level: Not on file   Occupational History    Not on file   Tobacco Use    Smoking status: Former     Types: Cigarettes     Quit date: 1980     Years since quittin.9    Smokeless tobacco: Never   Vaping Use    Vaping Use: Never used   Substance and Sexual Activity    Alcohol use:  Yes     Alcohol/week: 1.0 standard drink     Types: 1 Cans of beer per week     Comment: occasionally    Drug use: No    Sexual activity: Not on file   Other Topics Concern    Not on file   Social History Narrative    Not on file     Social Determinants of Health     Financial Resource Strain: Not on file   Food Insecurity: Not on file   Transportation Needs: Not on file   Physical Activity: Not on file   Stress: Not on file   Social Connections: Not on file   Intimate Partner Violence: Not on file   Housing Stability: Not on file       Family History:    Family History   Problem Relation Age of Onset    Cancer Mother     High Blood Pressure Mother     Kidney Disease Mother     Heart Disease Father     Heart Disease Maternal Uncle        REVIEW OF SYSTEMS:  All systems reviewed and negative except for that already noted in the HPI. Physical Exam:      Patient Vitals for the past 24 hrs:   BP Temp Pulse Resp SpO2 Height Weight   11/21/22 0553 -- -- -- -- -- 5' 10\" (1.778 m) 268 lb (121.6 kg)   11/21/22 0550 138/79 97.6 °F (36.4 °C) 58 16 95 % -- --     Constitutional: Patient in no acute distress; Neuro: alert and oriented to person place and time. Psych: Mood and affect normal.  Skin: Normal  Lungs: Respiratory effort normal  Cardiovascular:  Normal peripheral pulses  Abdomen: Soft, non-tender, non-distended with no CVA, flank pain, hepatosplenomegaly or hernia. Kidneys normal.  Bladder non-tender and not distended. Lymphatics: no palpable lymphadenopathy        Assessment and Plan   Impression:    Patient Active Problem List   Diagnosis    Chest pain       Plan: cysto urethral dilation    Risks benefits and alternative procedures are explained, informed consent is obtained, and the patient elects to proceed.

## 2022-11-21 NOTE — OP NOTE
FACILITY: [facility], [facility], [address]  1950  [id number]    DATE:  11/21/22  SURGEON:  Dr. Emerald Ely MD , M.D.  Chandni Harms:  Dr. Emerald Ely MD MJETHRO. PREOPERATIVE DIAGNOSIS: Urethral stricture  POSTOPERATIVE DIAGNOSIS: Same  PROCEDURES PERFORMED:  Meatal dilation  2. Cystourethroscopy. 3. Urethral dilation. 4. Difficult feng placement  ANESTHESIA: MAC  COMPLICATIONS: None. DRAINS: 16 fr feng  SPECIMEN: none  ESTIMATED BLOOD LOSS: Less than 5 mL. INDICATIONS FOR THE PROCEDURE:  Noni Ny is a 67 y.o. male  with a history of urethral stricture. Risks benefits and alternative procedures were explained and informed consents was explained. DETAILS OF THE PROCEDURE:  The patient was correctly identified in the preoperative holding area. He was brought back to the operating room and placed in the dorsal lithotomy position. Anesthesia was administered; antibiotics administered by Anesthesia. EPC cuffs were on and functional. Patient was then prepped and draped in the usual sterile fashion. Once an appropriate time out had been performed, with all parties consenting,  we dilated his meatal stricture with Marzette Hollister buren sounds (8 fr to 26 fr), and a 25 Estonian cystoscope with a 30-degree lens was placed through the urethra and we encountered a urethral stricture that was difficult to by pass with the scope. We elected to dilate with Cook-S dilators, from 12 fr to 20 fr.  We then advanced the cystoscope into the bladder. A pan cystoscopy was preformed and the bladder was unremarkable. No discrete tumors were identified. No bladder stones. We then placed a difficult feng, 16 fr catheter was placed over the glide wire. The patient tolerated the procedure well and the primary service will proceed with their part of the operation.        Plan:  The patient can be discharged home with feng catheter  Follow up: 11/23/22 for catheter removal  Please teach intermittent catheterization daily x 3 months  Office visit in 4-6 weeks

## 2022-11-21 NOTE — DISCHARGE INSTRUCTIONS
Discharge instructions: Cystoscopy  You May experience painful urination and see blood in the urine after your procedure. This should resolve over time. Home with feng catheter. Please teach feng education and send home with leg and night bag. You may see intermittent blood in the urine while the catheter in place. If the catheter becomes obstructed and needs to be exchanged, please call. Pt ok to discharge home in good condition  No heavy lifting, >10 lbs for today  Pt should avoid strenuous activity for today  Pt should walk moderately at home  Pt ok to shower   Pt may resume diet as tolerated  Please call attending physician or hospital  with questions  Call or Present to ED if fever (> 101F), intractable nausea vomiting or pain.   Rx in chart     Pt should follow up with Dr. Britt La MD , in 1 weeks, call to confirm appointment

## 2022-11-21 NOTE — PROGRESS NOTES
Pt has met discharge criteria and states he is ready for discharge to home. IV removed, gauze and tape applied. Dressed in own clothes and personal belongings gathered. Discharge instructions (with opioid medication education information) given to pt and family; pt and family verbalized understanding of discharge instructions, prescriptions and follow up appointments. Pt transported to discharge lobby by [facility] staff.

## 2022-11-21 NOTE — PROGRESS NOTES
4610- Pt to PACU. Awake and alert, VSS on RA, denies pain and nausea. 0900- Resting with eyes closed. Denies pain/nausea.  VSS.    0920- Pt met PACU discharge criteria, transferred to [facility].     [facility]

## 2022-11-23 ENCOUNTER — NURSE ONLY (OUTPATIENT)
Dept: UROLOGY | Age: 72
End: 2022-11-23
Payer: OTHER GOVERNMENT

## 2022-11-23 DIAGNOSIS — N40.1 BPH WITH URINARY OBSTRUCTION: Primary | ICD-10-CM

## 2022-11-23 DIAGNOSIS — N13.8 BPH WITH URINARY OBSTRUCTION: Primary | ICD-10-CM

## 2022-11-23 PROCEDURE — 51701 INSERT BLADDER CATHETER: CPT

## 2022-11-23 NOTE — PROGRESS NOTES
Patient has given me verbal consent to perform feng removal  Yes    10 cc of water deflated from feng balloon. 16 Fr feng removed without difficulty. Foreskin reduced back down? Yes      Pt will drink fluids and to ER in 5-6 hours if patient unable to urinate. F/u with provider Dr. Cherry Richardson. Patient was a post op urethroplasty cath removal today, patient was informed if he is unable to urinate and has to report to ER to have them notify Urology Services prior to re-placing the feng due to recent urethroplasty. Following Dr. Blackmon Pulse of care. Patient instructed on self cath with 16 Fr straight catheter     Patient was supplied with a 16 Fr straight catheter containing a water soluble solution, and a urinary hat for voiding log. Patient was instructed to wash his hands and clean the head of the penis. While patient is sitting on the edge of the chair I explained to the patient I will place the catheter first so he knows what to expect. Once I had shown the patient I gave the catheter to him and ensured he was comfortable with placing the catheter. Patient was advised that he needs to void on his own prior to using the catheter, and then to keep track of urine amount from the catheter. Prescription for catheter was faxed to company, supplying a new catheter for each use.

## 2022-11-30 ENCOUNTER — TELEPHONE (OUTPATIENT)
Dept: UROLOGY | Age: 72
End: 2022-11-30

## 2022-11-30 NOTE — TELEPHONE ENCOUNTER
Charisse Jones called stating that he is having [facility] with no hydrophilic marked.

## 2022-12-13 ENCOUNTER — HOSPITAL ENCOUNTER (EMERGENCY)
Age: 72
Discharge: HOME | End: 2022-12-13
Attending: STUDENT IN AN ORGANIZED HEALTH CARE EDUCATION/TRAINING PROGRAM
Payer: OTHER GOVERNMENT

## 2022-12-13 ENCOUNTER — APPOINTMENT (OUTPATIENT)
Dept: GENERAL RADIOLOGY | Age: 72
End: 2022-12-13
Payer: OTHER GOVERNMENT

## 2022-12-13 VITALS
HEIGHT: 70 IN | DIASTOLIC BLOOD PRESSURE: 84 MMHG | RESPIRATION RATE: 16 BRPM | SYSTOLIC BLOOD PRESSURE: 145 MMHG | OXYGEN SATURATION: 96 % | WEIGHT: 260 LBS | BODY MASS INDEX: 37.22 KG/M2 | TEMPERATURE: 100 F | HEART RATE: 74 BPM

## 2022-12-13 DIAGNOSIS — T83.511A URINARY TRACT INFECTION ASSOCIATED WITH CATHETERIZATION OF URINARY TRACT, UNSPECIFIED INDWELLING URINARY CATHETER TYPE, INITIAL ENCOUNTER (HCC): Primary | ICD-10-CM

## 2022-12-13 DIAGNOSIS — N39.0 URINARY TRACT INFECTION ASSOCIATED WITH CATHETERIZATION OF URINARY TRACT, UNSPECIFIED INDWELLING URINARY CATHETER TYPE, INITIAL ENCOUNTER (HCC): Primary | ICD-10-CM

## 2022-12-13 LAB
ANION GAP SERPL CALCULATED.3IONS-SCNC: 11 MEQ/L (ref 8–16)
BACTERIA: ABNORMAL /HPF
BASOPHILS # BLD: 0.3 %
BASOPHILS ABSOLUTE: 0 THOU/MM3 (ref 0–0.1)
BILIRUBIN URINE: NEGATIVE
BLOOD, URINE: ABNORMAL
BUN BLDV-MCNC: 18 MG/DL (ref 7–22)
CALCIUM SERPL-MCNC: 9.1 MG/DL (ref 8.5–10.5)
CASTS 2: ABNORMAL /LPF
CASTS UA: ABNORMAL /LPF
CHARACTER, URINE: ABNORMAL
CHLORIDE BLD-SCNC: 98 MEQ/L (ref 98–111)
CO2: 23 MEQ/L (ref 23–33)
COLOR: YELLOW
CREAT SERPL-MCNC: 0.8 MG/DL (ref 0.4–1.2)
CRYSTALS, UA: ABNORMAL
EOSINOPHIL # BLD: 0.3 %
EOSINOPHILS ABSOLUTE: 0 THOU/MM3 (ref 0–0.4)
EPITHELIAL CELLS, UA: ABNORMAL /HPF
ERYTHROCYTE [DISTWIDTH] IN BLOOD BY AUTOMATED COUNT: 12.1 % (ref 11.5–14.5)
ERYTHROCYTE [DISTWIDTH] IN BLOOD BY AUTOMATED COUNT: 37.6 FL (ref 35–45)
GFR SERPL CREATININE-BSD FRML MDRD: > 60 ML/MIN/1.73M2
GLUCOSE BLD-MCNC: 261 MG/DL (ref 70–108)
GLUCOSE URINE: NEGATIVE MG/DL
HCT VFR BLD CALC: 32.1 % (ref 42–52)
HEMOGLOBIN: 11.7 GM/DL (ref 14–18)
IMMATURE GRANS (ABS): 0.3 THOU/MM3 (ref 0–0.07)
IMMATURE GRANULOCYTES: 3.3 %
INFLUENZA A: NOT DETECTED
INFLUENZA B: NOT DETECTED
KETONES, URINE: NEGATIVE
LEUKOCYTE ESTERASE, URINE: ABNORMAL
LYMPHOCYTES # BLD: 4.1 %
LYMPHOCYTES ABSOLUTE: 0.4 THOU/MM3 (ref 1–4.8)
MCH RBC QN AUTO: 31.5 PG (ref 26–33)
MCHC RBC AUTO-ENTMCNC: 36.4 GM/DL (ref 32.2–35.5)
MCV RBC AUTO: 86.3 FL (ref 80–94)
MISCELLANEOUS 2: ABNORMAL
MONOCYTES # BLD: 8.8 %
MONOCYTES ABSOLUTE: 0.8 THOU/MM3 (ref 0.4–1.3)
NITRITE, URINE: POSITIVE
NUCLEATED RED BLOOD CELLS: 0 /100 WBC
OSMOLALITY CALCULATION: 275.4 MOSMOL/KG (ref 275–300)
PH UA: 6 (ref 5–9)
PLATELET # BLD: 132 THOU/MM3 (ref 130–400)
PMV BLD AUTO: 9.4 FL (ref 9.4–12.4)
POTASSIUM SERPL-SCNC: 4.6 MEQ/L (ref 3.5–5.2)
PRO-BNP: 260.4 PG/ML (ref 0–900)
PROTEIN UA: NEGATIVE
RBC # BLD: 3.72 MILL/MM3 (ref 4.7–6.1)
RBC URINE: ABNORMAL /HPF
RENAL EPITHELIAL, UA: ABNORMAL
SARS-COV-2 RNA, RT PCR: NOT DETECTED
SEG NEUTROPHILS: 83.2 %
SEGMENTED NEUTROPHILS ABSOLUTE COUNT: 7.7 THOU/MM3 (ref 1.8–7.7)
SODIUM BLD-SCNC: 132 MEQ/L (ref 135–145)
SPECIFIC GRAVITY, URINE: 1.01 (ref 1–1.03)
TROPONIN T: < 0.01 NG/ML
UROBILINOGEN, URINE: 0.2 EU/DL (ref 0–1)
WBC # BLD: 9.2 THOU/MM3 (ref 4.8–10.8)
WBC UA: ABNORMAL /HPF
YEAST: ABNORMAL

## 2022-12-13 PROCEDURE — 87086 URINE CULTURE/COLONY COUNT: CPT

## 2022-12-13 PROCEDURE — 84484 ASSAY OF TROPONIN QUANT: CPT

## 2022-12-13 PROCEDURE — 96365 THER/PROPH/DIAG IV INF INIT: CPT

## 2022-12-13 PROCEDURE — 81001 URINALYSIS AUTO W/SCOPE: CPT

## 2022-12-13 PROCEDURE — 85025 COMPLETE CBC W/AUTO DIFF WBC: CPT

## 2022-12-13 PROCEDURE — 36415 COLL VENOUS BLD VENIPUNCTURE: CPT

## 2022-12-13 PROCEDURE — 87636 SARSCOV2 & INF A&B AMP PRB: CPT

## 2022-12-13 PROCEDURE — 83880 ASSAY OF NATRIURETIC PEPTIDE: CPT

## 2022-12-13 PROCEDURE — 80048 BASIC METABOLIC PNL TOTAL CA: CPT

## 2022-12-13 PROCEDURE — 6360000002 HC RX W HCPCS: Performed by: STUDENT IN AN ORGANIZED HEALTH CARE EDUCATION/TRAINING PROGRAM

## 2022-12-13 PROCEDURE — 87077 CULTURE AEROBIC IDENTIFY: CPT

## 2022-12-13 PROCEDURE — 71046 X-RAY EXAM CHEST 2 VIEWS: CPT

## 2022-12-13 PROCEDURE — 2580000003 HC RX 258: Performed by: STUDENT IN AN ORGANIZED HEALTH CARE EDUCATION/TRAINING PROGRAM

## 2022-12-13 PROCEDURE — 99284 EMERGENCY DEPT VISIT MOD MDM: CPT

## 2022-12-13 PROCEDURE — 87186 SC STD MICRODIL/AGAR DIL: CPT

## 2022-12-13 RX ORDER — SULFAMETHOXAZOLE AND TRIMETHOPRIM 800; 160 MG/1; MG/1
1 TABLET ORAL 2 TIMES DAILY
Qty: 20 TABLET | Refills: 0 | Status: SHIPPED | OUTPATIENT
Start: 2022-12-13 | End: 2022-12-23

## 2022-12-13 RX ADMIN — CEFTRIAXONE SODIUM 1000 MG: 1 INJECTION, POWDER, FOR SOLUTION INTRAMUSCULAR; INTRAVENOUS at 07:19

## 2022-12-13 ASSESSMENT — PAIN - FUNCTIONAL ASSESSMENT
PAIN_FUNCTIONAL_ASSESSMENT: NONE - DENIES PAIN

## 2022-12-13 NOTE — ED NOTES
Patient ambulating to restroom to provide urine sample at this time.      Jael Card, RN  12/13/22 [id number]

## 2022-12-13 NOTE — ED PROVIDER NOTES
[facility] [address] [address] EMERGENCY DEPT  EMERGENCY DEPARTMENT     Pt Name: Loco Arauz  MRN: [id number]  [place] 1950  Date of evaluation: 12/13/2022  Provider: King Montalvo MD,     [address] [place] [address]       Chief Complaint   Patient presents with    Fever       HISTORY OF PRESENT ILLNESS    Loco Arauz is a 67 y.o. male who presents to the emergency department from home with chief complaint of fever intermittently for the last several days. He reports T-max of 104 however states this was with a forehead thermometer. He denies chills. He denies fatigue. He denies abdominal pain, nausea, vomiting. He denies chest pain or shortness of breath. He reports a chronic cough but denies any acute exacerbations. Denies nasal congestion, rhinorrhea, sore throat. He denies headache. He denies neck pain or vision changes. Patient reports he intermittently caths for urethral strictures and states that he is felt as though he has had some burning with urination    Triage notes and Nursing notes were reviewed by myself. Any discrepancies are addressed above.     PAST MEDICAL HISTORY     Past Medical History:   Diagnosis Date    Arthritis     Back, knees    CAD (coronary artery disease)     CHF (congestive heart failure) ([facility])     \"CABG X5\"    COPD (chronic obstructive pulmonary disease) ([facility])     Hx of blood clots     Hyperlipidemia     Hypertension     PE (pulmonary embolism) 2003    Pneumonia     pneumonia in 2003       SURGICAL HISTORY       Past Surgical History:   Procedure Laterality Date    CARDIAC SURGERY      CABG X5    COLONOSCOPY      CORONARY ARTERY BYPASS GRAFT  10/2003    [facility]    CYSTOSCOPY N/A 11/21/2022    CYSTOSCOPY URETHRAL DILATATION performed by Isaias Jaime MD at [address] [place] [address], COLON, DIAGNOSTIC      HEEL SPUR SURGERY Left 2007    HEEL SPUR SURGERY Right 2008    HYDROCELE EXCISION Right 1998    INGUINAL HERNIA REPAIR Right 1992    KNEE SURGERY  2016    right knee    SKIN CANCER EXCISION      pt states he had 2 skin spots removed from chest and left arm     TURP N/A 5/16/2022    CYSTOSCOPY, GREENLIGHT PHOTO VAPORIZATION OF THE PROSTATE performed by Laurent Gillette MD at [address] [place] [address]  10/2003    cecilio filter       CURRENT MEDICATIONS       Previous Medications    ALBUTEROL-IPRATROPIUM (COMBIVENT RESPIMAT)  MCG/ACT AERS INHALER    Inhale 1 puff into the lungs 4 times daily    AMLODIPINE (NORVASC) 10 MG TABLET    Take 10 mg by mouth daily    ASPIRIN 81 MG EC TABLET    Take 81 mg by mouth daily    ATORVASTATIN (LIPITOR) 40 MG TABLET    Take 40 mg by mouth daily    CLOPIDOGREL (PLAVIX) 75 MG TABLET    Take 75 mg by mouth daily    DUTASTERIDE (AVODART) 0.5 MG CAPSULE    Take 1 capsule by mouth daily    GLUCOSAMINE-CHONDROITIN (GLUCOSAMINE CHONDR COMPLEX PO)    Take 1 tablet by mouth 2 times daily     LOSARTAN (COZAAR) 100 MG TABLET    Take 100 mg by mouth daily. MAGNESIUM OXIDE (MAG-OX) 400 MG TABLET    Take 400 mg by mouth 2 times daily    METFORMIN (GLUCOPHAGE) 1000 MG TABLET    Take 1,000 mg by mouth 2 times daily (with meals)    MULTIPLE VITAMINS-MINERALS (MULTIVITAMIN PO)    Take  by mouth daily. NITROGLYCERIN (NITROSTAT) 0.4 MG SL TABLET    Place 0.4 mg under the tongue every 5 minutes as needed for Chest pain up to max of 3 total doses. If no relief after 1 dose, call 911. OMEGA-3 FATTY ACIDS (FISH OIL PO)    Take  by mouth daily. PANTOPRAZOLE SODIUM (PROTONIX) 40 MG PACK PACKET    Take 40 mg by mouth every morning (before breakfast)    TIOTROPIUM (SPIRIVA RESPIMAT) 2.5 MCG/ACT AERS INHALER    Inhale 2 puffs into the lungs daily       ALLERGIES     Patient has no known allergies.     FAMILY HISTORY       Family History   Problem Relation Age of Onset    Cancer Mother     High Blood Pressure Mother     Kidney Disease Mother     Heart Disease Father     Heart Disease Maternal Uncle         SOCIAL HISTORY       Social History Socioeconomic History    Marital status:      Spouse name: None    Number of children: None    Years of education: None    Highest education level: None   Tobacco Use    Smoking status: Former     Types: Cigarettes     Quit date: 1980     Years since quittin.9    Smokeless tobacco: Never   Vaping Use    Vaping Use: Never used   Substance and Sexual Activity    Alcohol use: Yes     Alcohol/week: 1.0 standard drink     Types: 1 Cans of beer per week     Comment: occasionally    Drug use: No       REVIEW OF SYSTEMS     Review of Systems  - CONSTITUTIONAL: Denies weight loss, reports fever    - HEENT: Denies changes in vision and hearing.    -   RESPIRATORY: Denies SOB and cough. - CV: Denies palpitations and CP.       - GI: Denies abdominal pain, nausea, vomiting and diarrhea.      - : Reports dysuria      - MSK: Denies myalgia and joint pain. - SKIN: Denies rash and pruritus.    -   NEUROLOGICAL: Denies headache and syncope. - PSYCHIATRIC: Denies recent changes in mood. Denies anxiety and depression. Except as noted above the remainder of the review of systems was reviewed and is. PHYSICAL EXAM    (up to 7 for level 4, 8 or more for level 5)     ED Triage Vitals [22 0245]   BP Temp Temp Source Heart Rate Resp SpO2 Height Weight   (!) 151/87 100 °F (37.8 °C) Oral 96 19 95 % 5' 10\" (1.778 m) 260 lb (117.9 kg)       Physical Exam  Constitutional:  Well developed, well nourished, no acute distress, non-toxic appearance   Eyes: No scleral icterus, conjunctiva normal   HENT:  Atraumatic, external ears normal, nose normal, oropharynx moist, no pharyngeal exudates.  Neck- normal range of motion, no tenderness, supple, no JVD, no meningismus  Respiratory:  No respiratory distress, normal breath sounds, no rales, no wheezing   Cardiovascular:  Normal rate, normal rhythm, no murmurs, no gallops, no rubs   GI:  Soft, nondistended, normal bowel sounds, nontender, no organomegaly, no mass, no rebound, no guarding   :  No costovertebral angle tenderness   Musculoskeletal:  No edema, no tenderness, no deformities. Back- no tenderness  Integument:  Well hydrated, no rash   Lymphatic:  No lymphadenopathy noted   Neurologic:  Alert & oriented x 3,  no focal deficits noted   Psychiatric:  Speech and behavior appropriate  DIAGNOSTIC RESULTS     EKG:(none if blank)  All EKG's are interpreted by [facility] who either signs or Co-signs this chart in the absence of a cardiologist.        RADIOLOGY: (none if blank)   Interpretation per the Radiologistbelow, if available at the time of this note:    XR CHEST (2 VW)   Final Result      Left midlung scarring. Small left pleural effusion is seen on CT. This document has been electronically signed by: Jadon Centeno MD on    12/13/2022 04:54 AM          LABS:  Labs Reviewed   CBC WITH AUTO DIFFERENTIAL - Abnormal; Notable for the following components:       Result Value    RBC 3.72 (*)     Hemoglobin 11.7 (*)     Hematocrit 32.1 (*)     MCHC 36.4 (*)     Lymphocytes Absolute 0.4 (*)     Immature Grans (Abs) 0.30 (*)     All other components within normal limits   BASIC METABOLIC PANEL - Abnormal; Notable for the following components:    Sodium 132 (*)     Glucose 261 (*)     All other components within normal limits   URINE WITH REFLEXED MICRO - Abnormal; Notable for the following components:    Blood, Urine SMALL (*)     Nitrite, Urine POSITIVE (*)     Leukocyte Esterase, Urine MODERATE (*)     All other components within normal limits   COVID-19 & INFLUENZA COMBO   CULTURE, REFLEXED, URINE   TROPONIN   BRAIN NATRIURETIC PEPTIDE   ANION GAP   GLOMERULAR FILTRATION RATE, ESTIMATED   OSMOLALITY       All other labs were within normal range or not returned as of this dictation. Please note, any cultures that may have been sent were not resulted at the time of this patient visit.     EMERGENCY DEPARTMENT COURSE andMedical Decision Making:     MDM  /   Physical examination grossly unremarkable. Low-grade temp. Chest x-ray without evidence of pneumonia. Left pleural effusion is reportedly old and the patient is entirely asymptomatic in that regard. Considering comorbidities laboratory eval was ordered and was thoroughly unremarkable. Urinalysis however did show evidence of a urinary tract infection. Considering this is catheter associated the patient was given a gram of IV Rocephin in the emergency department and will be discharged home with a prescription for Bactrim. This is evidence-based as the patient is high risk for comorbidities from ciprofloxacin considering his underlying cardiac comorbidities. Patient has a follow-up appointment with Dr. Keyanna Bearden scheduled for Friday. Considering her normal laboratory evaluation and the overall well appearance of the patient it is reasonable to treat him in the outpatient setting as opposed to admit him for IV antibiotics. Strict returnprecautions and follow up instructions were discussed with the patient with which the patient agrees    ED Medications administered this visit:  Medications - No data to display      Procedures: (None if blank)       CLINICAL       1.  Urinary tract infection associated with catheterization of urinary tract, unspecified indwelling urinary catheter type, initial encounter [facility])          DISPOSITION/PLAN   DISPOSITION    Discharge    PATIENT REFERRED TO:  Debera Severance., MD  [address] [place] [address]  [phone]    In 3 days      DISCHARGE MEDICATIONS:  Discharge Medication List as of 12/13/2022  7:54 AM        START taking these medications    Details   sulfamethoxazole-trimethoprim (BACTRIM DS) 800-160 MG per tablet Take 1 tablet by mouth 2 times daily for 10 days, Disp-20 tablet, R-0Normal                    (Please note that portions of this note were completed with a voice recognition program.  Efforts were made to edit the dictations

## 2022-12-13 NOTE — ED TRIAGE NOTES
Pt presents to the ED with c/o a fever. Pt states he has a forehead thermometer at home that he has been using and it showed him that he had a 104.9 degree fever. Pt current oral temp is 100.0 degrees. VSS.

## 2022-12-15 LAB
ORGANISM: ABNORMAL
URINE CULTURE REFLEX: ABNORMAL

## 2022-12-16 ENCOUNTER — OFFICE VISIT (OUTPATIENT)
Dept: UROLOGY | Age: 72
End: 2022-12-16
Payer: OTHER GOVERNMENT

## 2022-12-16 VITALS — RESPIRATION RATE: 20 BRPM | WEIGHT: 261 LBS | HEIGHT: 70 IN | BODY MASS INDEX: 37.37 KG/M2

## 2022-12-16 DIAGNOSIS — N30.01 ACUTE CYSTITIS WITH HEMATURIA: ICD-10-CM

## 2022-12-16 DIAGNOSIS — N99.116 POSTPROCEDURAL STRICTURE OF OVERLAPPING SITES OF URETHRA IN MALE: ICD-10-CM

## 2022-12-16 DIAGNOSIS — N13.8 BPH WITH URINARY OBSTRUCTION: Primary | ICD-10-CM

## 2022-12-16 DIAGNOSIS — N40.1 BPH WITH URINARY OBSTRUCTION: Primary | ICD-10-CM

## 2022-12-16 PROCEDURE — G8417 CALC BMI ABV UP PARAM F/U: HCPCS | Performed by: UROLOGY

## 2022-12-16 PROCEDURE — 1036F TOBACCO NON-USER: CPT | Performed by: UROLOGY

## 2022-12-16 PROCEDURE — G8484 FLU IMMUNIZE NO ADMIN: HCPCS | Performed by: UROLOGY

## 2022-12-16 PROCEDURE — 99214 OFFICE O/P EST MOD 30 MIN: CPT | Performed by: UROLOGY

## 2022-12-16 PROCEDURE — G8427 DOCREV CUR MEDS BY ELIG CLIN: HCPCS | Performed by: UROLOGY

## 2022-12-16 PROCEDURE — 1123F ACP DISCUSS/DSCN MKR DOCD: CPT | Performed by: UROLOGY

## 2022-12-16 PROCEDURE — 3017F COLORECTAL CA SCREEN DOC REV: CPT | Performed by: UROLOGY

## 2022-12-16 RX ORDER — NITROFURANTOIN 25; 75 MG/1; MG/1
100 CAPSULE ORAL DAILY
Qty: 30 CAPSULE | Refills: 3 | Status: SHIPPED | OUTPATIENT
Start: 2022-12-16 | End: 2023-01-15

## 2022-12-16 RX ORDER — NITROFURANTOIN 25; 75 MG/1; MG/1
100 CAPSULE ORAL DAILY
Qty: 30 CAPSULE | Refills: 3 | Status: SHIPPED | OUTPATIENT
Start: 2022-12-16 | End: 2022-12-16

## 2022-12-16 NOTE — PROGRESS NOTES
Pharmacy Note  ED Culture Follow-up    Loco Arauz is a 67 y.o. male. Allergies: Patient has no known allergies. Current antimicrobials:   Reviewed patient's urine culture - culture positive for Klebsiella pneumoniae. Patient was discharged on sulfamethoxazole/trimethoprim 800-160mg BID x 10 days, and culture is sensitive to prescribed medication. Antibiotic prescribed at discharge is appropriate - no changes made to antibiotic regimen. Please call with any questions.  Kristy Nix [facility], PharmD 2:43 PM 12/16/2022

## 2022-12-16 NOTE — PROGRESS NOTES
MD [facility] Consultation / New Patient Visit    Patient:  Colby Cavanaugh  YOB: 1950  Date: 12/16/2022  Consult requested from DIMA Matos CNP     HISTORY OF PRESENT ILLNESS:   The patient is a 67 y.o. male who presents today for follow-up for the following problem(s): BPH with LUTs  Overall the problem(s) : are worsening. Associated Symptoms: No dysuria, gross hematuria. Pain Severity:      Today visit:   12/16/22   Patient presents with BPH s/p Greenlight PVP (5/16/22) - PVR 8 ml. Had post op stricture, s/p dilation. On an ISC regimen x 3 months. He also has developed UTI, which is treated on abx. Summary of old records:   (Patient's old records, notes and chart reviewed and summarized above.)    Cystoscopy Operative Note  Surgeon: Sri Jones MD   Anesthesia: Urethral 2%  Indications: BPH  Position: supine  Findings:   The patient was prepped and draped in the usual sterile fashion. The flexible cystoscope was advanced through the urethra and into the bladder. The bladder was thoroughly inspected and the following was noted:    Residual Urine: Moderate  Urethra: No abnormalities of the urethra are noted. Prostate: Medium to large gland (80 gm) Complete obstruction by lateral & small median lobe of prostate. Bladder: No tumors or CIS noted. No bladder diverticulum. Moderate  trabeculation noted. Ureters: Clear efflux from both ureters. Orifices with normal configuration and location. The cystoscope was removed. The patient tolerated the procedure well. Plan  Good candidate for Greenlight vs Urolift (would need US prior)        Last several PSA's:  Lab Results   Component Value Date    PSA 0.73 09/22/2021       Last total testosterone:  No results found for: TESTOSTERONE    Urinalysis today:  No results found for this visit on 12/16/22.         Last BUN and creatinine:  Lab Results   Component Value Date    BUN 18 12/13/2022     Lab Results   Component Value Date    CREATININE 0.8 12/13/2022       Imaging Reviewed during this Office Visit:   (results were independently reviewed by physician and radiology report verified)    PAST MEDICAL, FAMILY AND SOCIAL HISTORY:  Past Medical History:   Diagnosis Date    Arthritis     Back, knees    CAD (coronary artery disease)     CHF (congestive heart failure) ([facility] [address]     \"CABG X5\"    COPD (chronic obstructive pulmonary disease) ([facility])     Hx of blood clots     Hyperlipidemia     Hypertension     PE (pulmonary embolism) 2003    Pneumonia     pneumonia in 2003     Past Surgical History:   Procedure Laterality Date    CARDIAC SURGERY      CABG X5    COLONOSCOPY      CORONARY ARTERY BYPASS GRAFT  10/2003    [facility]    CYSTOSCOPY N/A 11/21/2022    CYSTOSCOPY URETHRAL DILATATION performed by Charles Davis MD at [address] [facility] [address] SURGERY Left 2007    HEEL SPUR SURGERY Right 2008    HYDROCELE EXCISION Right [id number] [facility] Right 1992    KNEE SURGERY  2016    right knee    SKIN CANCER EXCISION      pt states he had 2 skin spots removed from chest and left arm     TURP N/A 5/16/2022    CYSTOSCOPY, GREENLIGHT PHOTO VAPORIZATION OF THE PROSTATE performed by Charles Davis MD at [facility]  10/2003    cecilio filter     Family History   Problem Relation Age of Onset    Cancer Mother     High Blood Pressure Mother     Kidney Disease Mother     Heart Disease Father     Heart Disease Maternal Uncle      Outpatient Medications Marked as Taking for the 12/16/22 encounter (Office Visit) with Charles Davis MD   Medication Sig Dispense Refill    sulfamethoxazole-trimethoprim (BACTRIM DS) 800-160 MG per tablet Take 1 tablet by mouth 2 times daily for 10 days 20 tablet 0    magnesium oxide (MAG-OX) 400 MG tablet Take 400 mg by mouth 2 times daily      dutasteride (AVODART) 0.5 MG capsule Take 1 capsule by mouth daily 90 capsule 4    atorvastatin (LIPITOR) 40 MG tablet Take 40 mg by mouth daily      amLODIPine (NORVASC) 10 MG tablet Take 10 mg by mouth daily      tiotropium (SPIRIVA RESPIMAT) 2.5 MCG/ACT AERS inhaler Inhale 2 puffs into the lungs daily      metFORMIN (GLUCOPHAGE) 1000 MG tablet Take 1,000 mg by mouth 2 times daily (with meals)      clopidogrel (PLAVIX) 75 MG tablet Take 75 mg by mouth daily      pantoprazole sodium (PROTONIX) 40 MG PACK packet Take 40 mg by mouth every morning (before breakfast)      aspirin 81 MG EC tablet Take 81 mg by mouth daily      nitroGLYCERIN (NITROSTAT) 0.4 MG SL tablet Place 0.4 mg under the tongue every 5 minutes as needed for Chest pain up to max of 3 total doses. If no relief after 1 dose, call 911.      albuterol-ipratropium (COMBIVENT RESPIMAT)  MCG/ACT AERS inhaler Inhale 1 puff into the lungs 4 times daily      losartan (COZAAR) 100 MG tablet Take 100 mg by mouth daily. Multiple Vitamins-Minerals (MULTIVITAMIN PO) Take  by mouth daily. Omega-3 Fatty Acids (FISH OIL PO) Take  by mouth daily. Glucosamine-Chondroitin (GLUCOSAMINE CHONDR COMPLEX PO) Take 1 tablet by mouth 2 times daily          Patient has no known allergies.   Social History     Tobacco Use   Smoking Status Former    Types: Cigarettes    Quit date: 1980    Years since quittin.9   Smokeless Tobacco Never       Social History     Substance and Sexual Activity   Alcohol Use Yes    Alcohol/week: 1.0 standard drink    Types: 1 Cans of beer per week    Comment: occasionally       REVIEW OF SYSTEMS:  Constitutional: negative  Eyes: negative  Respiratory: negative  Cardiovascular: negative  Gastrointestinal: negative  Musculoskeletal: negative  Genitourinary: negative  Skin: negative   Neurological: negative  Hematological/Lymphatic: negative  Psychological: negative    Physical Exam:    This a 67 y.o. male   Vitals:    22 1058   Resp: 20     Constitutional: Patient in no acute distress   Neuro: alert and oriented to person place and time. Psych: Mood and affect normal.  Head: atraumatic normocephalic  Eyes: EOMi  HEENT: neck supple, trachea midline  Lungs: Respiratory effort normal  Cardiovascular:  Normal peripheral pulses  Abdomen: Soft, non-tender, non-distended, No CVA  Bladder: non-tender and not distended. FROMx4, no cyanosis clubbing edema  Skin: warm and dry      Assessment and Plan      1. BPH with urinary obstruction [N40.1, N13.8 (ICD-10-CM)]    2. Postprocedural stricture of overlapping sites of urethra in male    3. Acute cystitis with hematuria             Plan:      No follow-ups on file. BPH with LUTs - controlled after Greenlight PVP  - will start dutasteride to prevent regrowth.   - Cystoscopy with urethral dilation

## 2023-02-09 ENCOUNTER — HOSPITAL ENCOUNTER (OUTPATIENT)
Dept: ULTRASOUND IMAGING | Age: 73
Discharge: HOME | End: 2023-02-09
Payer: OTHER GOVERNMENT

## 2023-02-09 VITALS
TEMPERATURE: 98.6 F | WEIGHT: 268 LBS | OXYGEN SATURATION: 95 % | DIASTOLIC BLOOD PRESSURE: 76 MMHG | HEART RATE: 65 BPM | RESPIRATION RATE: 18 BRPM | SYSTOLIC BLOOD PRESSURE: 127 MMHG | BODY MASS INDEX: 38.45 KG/M2

## 2023-02-09 LAB
CREAT SERPL-MCNC: 0.8 MG/DL (ref 0.4–1.2)
DEPRECATED RDW RBC AUTO: 41 FL (ref 35–45)
ERYTHROCYTE [DISTWIDTH] IN BLOOD BY AUTOMATED COUNT: 13.2 % (ref 11.5–14.5)
GFR SERPL CREATININE-BSD FRML MDRD: > 60 ML/MIN/1.73M2
HCT VFR BLD AUTO: 35.3 % (ref 42–52)
HGB BLD-MCNC: 12.6 GM/DL (ref 14–18)
MCH RBC QN AUTO: 30.9 PG (ref 26–33)
MCHC RBC AUTO-ENTMCNC: 35.7 GM/DL (ref 32.2–35.5)
MCV RBC AUTO: 86.5 FL (ref 80–94)
PLATELET # BLD AUTO: 125 THOU/MM3 (ref 130–400)
PMV BLD AUTO: 9.3 FL (ref 9.4–12.4)
RBC # BLD AUTO: 4.08 MILL/MM3 (ref 4.7–6.1)
WBC # BLD AUTO: 4.7 THOU/MM3 (ref 4.8–10.8)

## 2023-02-09 PROCEDURE — 85027 COMPLETE CBC AUTOMATED: CPT

## 2023-02-09 PROCEDURE — 36415 COLL VENOUS BLD VENIPUNCTURE: CPT

## 2023-02-09 PROCEDURE — 6370000000 HC RX 637 (ALT 250 FOR IP): Performed by: RADIOLOGY

## 2023-02-09 PROCEDURE — 2580000003 HC RX 258: Performed by: RADIOLOGY

## 2023-02-09 PROCEDURE — 6360000002 HC RX W HCPCS: Performed by: RADIOLOGY

## 2023-02-09 PROCEDURE — 88307 TISSUE EXAM BY PATHOLOGIST: CPT

## 2023-02-09 PROCEDURE — 82565 ASSAY OF CREATININE: CPT

## 2023-02-09 PROCEDURE — 47000 NEEDLE BIOPSY OF LIVER PERQ: CPT | Performed by: NURSE PRACTITIONER

## 2023-02-09 PROCEDURE — 88313 SPECIAL STAINS GROUP 2: CPT

## 2023-02-09 RX ORDER — BACITRACIN, NEOMYCIN, POLYMYXIN B 400; 3.5; 5 [USP'U]/G; MG/G; [USP'U]/G
OINTMENT TOPICAL
Status: COMPLETED | OUTPATIENT
Start: 2023-02-09 | End: 2023-02-09

## 2023-02-09 RX ORDER — DUTASTERIDE 0.5 MG/1
0.5 CAPSULE, LIQUID FILLED ORAL DAILY
COMMUNITY

## 2023-02-09 RX ORDER — CETIRIZINE HYDROCHLORIDE 10 MG/1
10 TABLET ORAL DAILY
COMMUNITY

## 2023-02-09 RX ORDER — MIDAZOLAM HYDROCHLORIDE 1 MG/ML
1 INJECTION INTRAMUSCULAR; INTRAVENOUS ONCE
Status: COMPLETED | OUTPATIENT
Start: 2023-02-09 | End: 2023-02-09

## 2023-02-09 RX ORDER — FENTANYL CITRATE 50 UG/ML
50 INJECTION, SOLUTION INTRAMUSCULAR; INTRAVENOUS ONCE
Status: COMPLETED | OUTPATIENT
Start: 2023-02-09 | End: 2023-02-09

## 2023-02-09 RX ORDER — FLUTICASONE PROPIONATE 50 MCG
1 SPRAY, SUSPENSION (ML) NASAL DAILY
COMMUNITY

## 2023-02-09 RX ORDER — SODIUM CHLORIDE 450 MG/100ML
INJECTION, SOLUTION INTRAVENOUS CONTINUOUS
Status: DISCONTINUED | OUTPATIENT
Start: 2023-02-09 | End: 2023-02-10 | Stop reason: HOSPADM

## 2023-02-09 RX ADMIN — SODIUM CHLORIDE: 4.5 INJECTION, SOLUTION INTRAVENOUS at 07:07

## 2023-02-09 RX ADMIN — FENTANYL CITRATE 50 MCG: 50 INJECTION, SOLUTION INTRAMUSCULAR; INTRAVENOUS at 08:34

## 2023-02-09 RX ADMIN — BACITRACIN ZINC, NEOMYCIN SULFATE, AND POLYMYXIN B SULFATE 1 EACH: 400; 3.5; 5 OINTMENT TOPICAL at 08:47

## 2023-02-09 RX ADMIN — MIDAZOLAM 1 MG: 1 INJECTION INTRAMUSCULAR; INTRAVENOUS at 08:34

## 2023-02-09 ASSESSMENT — PAIN - FUNCTIONAL ASSESSMENT: PAIN_FUNCTIONAL_ASSESSMENT: NONE - DENIES PAIN

## 2023-02-09 NOTE — PROGRESS NOTES
__m__ Safety:       (Environmental)  [place] to environment  Ensure ID band is correct and in place/ allergy band as needed  Assess for fall risk  Initiate fall precautions as applicable (fall band, side rails, etc.)  Call light within reach  Bed in low position/ wheels locked    __m__ Pain:       Assess pain level and characteristics  Administer analgesics as ordered  Assess effectiveness of pain management and report to MD as needed    _m___ Knowledge Deficit:  Assess baseline knowledge  Provide teaching at level of understanding  Provide teaching via preferred learning method  Evaluate teaching effectiveness    _m___ Hemodynamic/Respiratory Status:       (Pre and Post Procedure Monitoring)  Assess/Monitor vital signs and LOC  Assess Baseline SpO2 prior to any sedation  Obtain weight/height  Assess vital signs/ LOC until patient meets discharge criteria  Monitor procedure site and notify MD of any issues    _

## 2023-02-09 NOTE — PROGRESS NOTES
0228 Patient received in ultrasound for ultrasound guided random liver biopsy procedure with wife at ultrasound holding area  0808 Monitor applied and pre scan started. 9376 Dr. Fili Youssef here; spoke to patient. 0820 This procedure has been fully reviewed with the patient and written informed consent has been obtained. 0220 Procedure started with Dr. Fili Youssef. 1469 Lidocaine given. 1872 Procedure completed; patient tolerated well. Six samples collected by the radiologist, placed in a sterile specimen cup, and given to the ultrasound tech to be labeled and sent to the pathologist for further review. Post scan images obtained. 0847 Bacitracin oint, band aid to site; no bleeding noted. 2209 Patient on cart, comfort ensured. 8757 Report called to OPN.  0004 Patient taken to OPN via cart with family at bedside.

## 2023-02-09 NOTE — H&P
[address] [address]  Sedation/Analgesia History & Physical    Pt Name: Clifford Gregory  MRN: [id number]  YOB: 1950  Provider Performing Procedure: Kimmy Siegel DO, MD  Primary Care Physician: DIMA Thacker CNP    Formulation and discussion of sedation / procedure plans, risks, benefits, side effects and alternatives with patient and/or responsible adult completed. PRE-PROCEDURE   DNR-CCA/DNR-CC []Yes [x]No  Brief History/Pre-Procedure Diagnosis: hepatic steatosis          MEDICAL HISTORY  [x]CAD/Valve  [x]Liver Disease  [x]Lung Disease [x]Diabetes  [x]Hypertension []Renal Disease  [x]Additional information:       has a past medical history of Arthritis, CAD (coronary artery disease), CHF (congestive heart failure) ([facility] [address] COPD (chronic obstructive pulmonary disease) ([facility] [address] Hx of blood clots, Hyperlipidemia, Hypertension, PE (pulmonary embolism), and Pneumonia. SURGICAL HISTORY   has a past surgical history that includes Coronary artery bypass graft (10/2003); Vena Cava Filter Placement (10/2003); Inguinal hernia repair (Right, 1992); Hydrocele surgery (Right, 1998); Heel spur surgery (Left, 2007); Heel spur surgery (Right, 2008); Colonoscopy; knee surgery (2016); Cardiac surgery; Endoscopy, colon, diagnostic; Skin cancer excision; TURP (N/A, 5/16/2022); and Cystoscopy (N/A, 11/21/2022).   Additional information:       ALLERGIES   Allergies as of 02/09/2023    (No Known Allergies)     Additional information:       MEDICATIONS   Coumadin Use Last 5 Days [x]No []Yes  Antiplatelet drug therapy use last 5 days  [x]No []Yes  Other anticoagulant use last 5 days  [x]No []Yes    Current Outpatient Medications:     fluticasone (FLONASE) 50 MCG/ACT nasal spray, 1 spray by Each Nostril route daily, Disp: , Rfl:     cetirizine (ZYRTEC) 10 MG tablet, Take 10 mg by mouth daily, Disp: , Rfl:     dutasteride (AVODART) 0.5 MG capsule, Take 0.5 mg by mouth daily, Disp: , Rfl:     magnesium oxide (MAG-OX) 400 MG tablet, Take 400 mg by mouth 2 times daily, Disp: , Rfl:     atorvastatin (LIPITOR) 40 MG tablet, Take 40 mg by mouth daily, Disp: , Rfl:     amLODIPine (NORVASC) 10 MG tablet, Take 10 mg by mouth daily, Disp: , Rfl:     tiotropium (SPIRIVA RESPIMAT) 2.5 MCG/ACT AERS inhaler, Inhale 2 puffs into the lungs daily, Disp: , Rfl:     metFORMIN (GLUCOPHAGE) 1000 MG tablet, Take 1,000 mg by mouth 2 times daily (with meals), Disp: , Rfl:     clopidogrel (PLAVIX) 75 MG tablet, Take 75 mg by mouth daily, Disp: , Rfl:     pantoprazole sodium (PROTONIX) 40 MG PACK packet, Take 40 mg by mouth every morning (before breakfast), Disp: , Rfl:     aspirin 81 MG EC tablet, Take 81 mg by mouth daily, Disp: , Rfl:     nitroGLYCERIN (NITROSTAT) 0.4 MG SL tablet, Place 0.4 mg under the tongue every 5 minutes as needed for Chest pain up to max of 3 total doses. If no relief after 1 dose, call 911., Disp: , Rfl:     albuterol-ipratropium (COMBIVENT RESPIMAT)  MCG/ACT AERS inhaler, Inhale 1 puff into the lungs 4 times daily, Disp: , Rfl:     losartan (COZAAR) 100 MG tablet, Take 100 mg by mouth daily. , Disp: , Rfl:     Multiple Vitamins-Minerals (MULTIVITAMIN PO), Take  by mouth daily. , Disp: , Rfl:     Omega-3 Fatty Acids (FISH OIL PO), Take  by mouth daily. , Disp: , Rfl:     Glucosamine-Chondroitin (GLUCOSAMINE CHONDR COMPLEX PO), Take 1 tablet by mouth 2 times daily , Disp: , Rfl:     Current Facility-Administered Medications:     0.45 % sodium chloride infusion, , IntraVENous, Continuous, Angelita Ceron DO, Last Rate: 20 mL/hr at 02/09/23 0707, New Bag at 02/09/23 0707    fentaNYL (SUBLIMAZE) injection 50 mcg, 50 mcg, IntraVENous, Once, Angelita Ceron DO    midazolam (VERSED) injection 1 mg, 1 mg, IntraVENous, Once, Angelita Ceron DO  Prior to Admission medications    Medication Sig Start Date End Date Taking?  Authorizing Provider   fluticasone (FLONASE) 50 MCG/ACT nasal spray 1 spray by Each Nostril route daily Yes Historical Provider, MD   cetirizine (ZYRTEC) 10 MG tablet Take 10 mg by mouth daily   Yes Historical Provider, MD   dutasteride (AVODART) 0.5 MG capsule Take 0.5 mg by mouth daily   Yes Historical Provider, MD   magnesium oxide (MAG-OX) 400 MG tablet Take 400 mg by mouth 2 times daily    Historical Provider, MD   atorvastatin (LIPITOR) 40 MG tablet Take 40 mg by mouth daily    Historical Provider, MD   amLODIPine (NORVASC) 10 MG tablet Take 10 mg by mouth daily    Historical Provider, MD   tiotropium (SPIRIVA RESPIMAT) 2.5 MCG/ACT AERS inhaler Inhale 2 puffs into the lungs daily    Historical Provider, MD   metFORMIN (GLUCOPHAGE) 1000 MG tablet Take 1,000 mg by mouth 2 times daily (with meals)    Historical Provider, MD   clopidogrel (PLAVIX) 75 MG tablet Take 75 mg by mouth daily    Historical Provider, MD   pantoprazole sodium (PROTONIX) 40 MG PACK packet Take 40 mg by mouth every morning (before breakfast)    Historical Provider, MD   aspirin 81 MG EC tablet Take 81 mg by mouth daily    Historical Provider, MD   nitroGLYCERIN (NITROSTAT) 0.4 MG SL tablet Place 0.4 mg under the tongue every 5 minutes as needed for Chest pain up to max of 3 total doses. If no relief after 1 dose, call 911. Historical Provider, MD   albuterol-ipratropium (COMBIVENT RESPIMAT)  MCG/ACT AERS inhaler Inhale 1 puff into the lungs 4 times daily    Historical Provider, MD   losartan (COZAAR) 100 MG tablet Take 100 mg by mouth daily. Historical Provider, MD   Multiple Vitamins-Minerals (MULTIVITAMIN PO) Take  by mouth daily. Historical Provider, MD   Omega-3 Fatty Acids (FISH OIL PO) Take  by mouth daily.     Historical Provider, MD   Glucosamine-Chondroitin (GLUCOSAMINE CHONDR COMPLEX PO) Take 1 tablet by mouth 2 times daily     Historical Provider, MD     Additional information:       VITAL SIGNS   Vitals:    02/09/23 0825   BP: (!) 152/72   Pulse: 61   Resp: 14   Temp:    SpO2: 98%       PHYSICAL:   Heart:  [x]Regular rate and rhythm  [x]Other: Murmur    Lungs:  [x]Clear    []Other:    Abdomen: [x]Soft    []Other:    Mental Status: [x]Alert & Oriented  []Other:      PLANNED PROCEDURE   [x]Biospy []Arteriogram              []Drainage   []Mediport Insertion  []Fistulogram []IV access       []Vertebroplasty / Augmentation  []IVC filter []Dialysis catheter []Biliary drainage  []Other: []CAPD Catheter []Nephrostomy Tube / Stent  SEDATION  Planned agent:[x]Midazolam []Meperidine [x]Sublimaze []Dilaudid []Morphine     []Diazepam  []Other:     ASA Classification:  []1 [x]2 []3 []4 []5  Class 1: A normal healthy patient  Class 2: Pt with mild to moderate systemic disease  Class 3: Severe systemic disease or disturbance  Class 4: Severe systemic disorders that are already life threatening. Class 5: Moribund pt with little chances of survival, for more than 24 hours. Mallampati I Airway Classification:   []1 []2 []3 [x]4    [x]Pre-procedure diagnostic studies complete and results available. Comment:    [x]Previous sedation/anesthesia experiences assessed. Comment:    [x]The patient is an appropriate candidate to undergo the planned procedure sedation and anesthesia. (Refer to nursing sedation/analgesia documentation record)  [x]Formulation and discussion of sedation/procedure plan, risks, and expectations with patient and/or responsible adult completed. [x]Patient examined immediately prior to the procedure.  (Refer to nursing sedation/analgesia documentation record)    Gil Mitchell DO, DO, MD  Electronically signed 2/9/2023 at 8:27 AM

## 2023-02-09 NOTE — POST SEDATION
Sedation Post Procedure Note    Patient Name: Kristen Greene   YOB: 1950  Room/Bed: Room/bed info not found  Medical Record Number: [id number]  Date: 2/9/2023   Time: 8:54 AM         Physicians/Assistants: Lisa Hooker DO, MD    Procedure Performed:  US guided random liver biopsy    Post-Sedation Vital Signs:  Vitals:    02/09/23 0845   BP: 135/83   Pulse: 66   Resp: 14   Temp:    SpO2: 95%      Vital signs were reviewed and were stable after the procedure (see flow sheet for vitals)            Post-Sedation Exam: stable           Complications: none    Electronically signed by Lisa Hooker DO on 2/9/2023 at 8:54 AM

## 2023-02-09 NOTE — DISCHARGE INSTRUCTIONS
POST BIOPSY DISCHARGE INSTRUCTION SHEET    DIET:  As tolerated    ACTIVITY:  Rest at home on sofa, bed or recliner today. Bathroom privileges only today. Limit any exertion (pushing or pulling) today. No lifting for 3 days. No driving today. Check biopsy site frequently today. Resume any blood thinners in 24 hours. Keep band aid clean & dry - replace as needed, may remove in 48 hours. RETURN TO NEAREST EMERGENCY ROOM IF YOU HAVE ANY OF THE FOLLOWING:  Sign of bleeding, swelling, drainage from biopsy site or severe pain (slight discomfort to be expected) around biopsy site. Repeated nausea/vomiting/abdominal pain. Elevated temperature above 101 degrees. Shortness of breath. Chest pain. Keep scheduled appointment with your physician. If sedation given, follow post sedation instruction sheet.      _ SEDATION/ANALGESIA INFORMATION HOME GOING ADVICE    Review the following information with the patient prior to the procedure. Sedation/agalgesia is used during short medical procedures under controlled supervision. The medication will produce a strong relaxation. You will be able to hear, speak and follow instructions, but you memory and alertness will be decreased. You will be able to swallow and breathe on your own. During sedation/analgesia you blood pressure, hear and breathing will be watched closely. After the procedure, you may not remember what was said or done. Procedure: Liver biopsy. Date: 2/9/2023 8:00 am.  You may have the following effects from the medication. Drowsiness, dizziness, sleepiness or confusion. Difficulty remembering or delayed reaction times. Loss of fine muscle control or difficulty with your balance especially while walking. Difficulty focusing or blurred vision. You may not be aware of slight changes in your behavior and/or your reaction time because of the medication used during the procedure. Therefore you should follow these instructions.   Have someone responsible help you with your care. Do not drive for 24 hours. Do not operate equipment for 24 hours (lawnmowers, power tools, kitchen accessories, stove). Do not drink any alcoholic beverages for a minimum of 24 hours. Do not make important personal, legal or business decisions for 24 hours. You may experience dizziness or lightheadedness. Move slowly and carefully, do not make sudden position changes. Drink extra amounts of fluids today. Increase your diet as tolerated (unless you have received specific instructions from your doctor). If you feel nauseated, continue with liquids until nausea is gone  Notify your physician if you have not urinated within 8 hours after the procedure. Resume your medications unless otherwise instructed. contact your physician if you have any questions or concerns. I have been informed and understand how to care for myself/the patient at home. i know who to call if Viki Alessandro question or concerns. The adult responsible for my discharge care is: You have received the sedation/analgesia medications/s:  1 mg Versed and 50 mcg Fentanyl.  IF YOU REPORT TO AN EMERGENCY ROOM, DOCTOR'S OFFICE OR HOSPITAL WITHIN 24 HRS AFTER PROCEDURE, BRING THIS SHEET WITH YOU AND GIVE IT THE PHYSICIAN OR NURSE ATTENDING YOU.

## 2023-02-17 ENCOUNTER — OFFICE VISIT (OUTPATIENT)
Dept: UROLOGY | Age: 73
End: 2023-02-17

## 2023-02-17 VITALS
SYSTOLIC BLOOD PRESSURE: 110 MMHG | HEIGHT: 70 IN | BODY MASS INDEX: 38.65 KG/M2 | WEIGHT: 270 LBS | DIASTOLIC BLOOD PRESSURE: 70 MMHG

## 2023-02-17 DIAGNOSIS — N13.8 BPH WITH URINARY OBSTRUCTION: Primary | ICD-10-CM

## 2023-02-17 DIAGNOSIS — R31.9 HEMATURIA, UNSPECIFIED TYPE: ICD-10-CM

## 2023-02-17 DIAGNOSIS — N40.1 BPH WITH URINARY OBSTRUCTION: Primary | ICD-10-CM

## 2023-02-17 LAB — POST VOID RESIDUAL (PVR): 77 ML

## 2023-02-17 RX ORDER — NITROFURANTOIN 25; 75 MG/1; MG/1
100 CAPSULE ORAL 2 TIMES DAILY
COMMUNITY

## 2023-02-17 RX ORDER — DUTASTERIDE 0.5 MG/1
0.5 CAPSULE, LIQUID FILLED ORAL DAILY
Qty: 90 CAPSULE | Refills: 3 | Status: SHIPPED | OUTPATIENT
Start: 2023-02-17 | End: 2023-05-18

## 2023-02-17 NOTE — PROGRESS NOTES
Dr. Mamta Camacho MD MD  [facility] Consultation / New Patient Visit    Patient:  Antony Vaz  YOB: 1950  Date: 2/17/2023  Consult requested from DIMA Matos CNP     HISTORY OF PRESENT ILLNESS:   The patient is a 67 y.o. male who presents today for follow-up for the following problem(s): BPH with LUTs  Overall the problem(s) : are worsening. Associated Symptoms: No dysuria, gross hematuria. Pain Severity:      Today visit:   2/17/23   Patient presents with BPH s/p Greenlight PVP (5/16/22) - PVR 8 ml. Had post op stricture, s/p dilation (11/2022). S/p ISC x 3 months   AUASS: 2/1  Dutasteride - to prevent regrowth      Summary of old records:   (Patient's old records, notes and chart reviewed and summarized above.)    Cystoscopy Operative Note  Surgeon: Mamta Camacho MD   Anesthesia: Urethral 2%  Indications: BPH  Position: supine  Findings:   The patient was prepped and draped in the usual sterile fashion. The flexible cystoscope was advanced through the urethra and into the bladder. The bladder was thoroughly inspected and the following was noted:    Residual Urine: Moderate  Urethra: No abnormalities of the urethra are noted. Prostate: Medium to large gland (80 gm) Complete obstruction by lateral & small median lobe of prostate. Bladder: No tumors or CIS noted. No bladder diverticulum. Moderate  trabeculation noted. Ureters: Clear efflux from both ureters. Orifices with normal configuration and location. The cystoscope was removed. The patient tolerated the procedure well. Plan  Good candidate for Greenlight vs Urolift (would need US prior)        Last several PSA's:  Lab Results   Component Value Date    PSA 0.73 09/22/2021       Last total testosterone:  No results found for: TESTOSTERONE    Urinalysis today:  No results found for this visit on 02/17/23.         Last BUN and creatinine:  Lab Results   Component Value Date    BUN 18 12/13/2022     Lab Results   Component Value Date    CREATININE 0.8 02/09/2023       Imaging Reviewed during this Office Visit:   (results were independently reviewed by physician and radiology report verified)    PAST MEDICAL, FAMILY AND SOCIAL HISTORY:  Past Medical History:   Diagnosis Date    Arthritis     Back, knees    CAD (coronary artery disease)     CHF (congestive heart failure) ([facility] [address]     \"CABG X5\"    COPD (chronic obstructive pulmonary disease) (H[facility])     Hx of blood clots     Hyperlipidemia     Hypertension     PE (pulmonary embolism) 2003    Pneumonia     pneumonia in 2003     Past Surgical History:   Procedure Laterality Date    CARDIAC SURGERY      CABG X5    COLONOSCOPY      CORONARY ARTERY BYPASS GRAFT  10/2003    [facility]    CYSTOSCOPY N/A 11/21/2022    CYSTOSCOPY URETHRAL DILATATION performed by Rochelle Rodriguez MD at [address], [address] SURGERY Left 2007    HEEL SPUR SURGERY Right 2008    HYDROCELE EXCISION Right [id number] [facility] Right 1992    KNEE SURGERY  2016    right knee    SKIN CANCER EXCISION      pt states he had 2 skin spots removed from chest and left arm     TURP N/A 5/16/2022    CYSTOSCOPY, GREENLIGHT PHOTO VAPORIZATION OF THE PROSTATE performed by Rochelle Rodriguez MD at [facility]  2/9/2023    US GUIDED LIVER BIOPSY PERCUTANEOUS 2/9/2023 Aristides Moss,  [facility]  10/2003    cecilio filter     Family History   Problem Relation Age of Onset    Cancer Mother     High Blood Pressure Mother     Kidney Disease Mother     Heart Disease Father     Heart Disease Maternal Uncle      Outpatient Medications Marked as Taking for the 2/17/23 encounter (Office Visit) with Rochelle Rodriguez MD   Medication Sig Dispense Refill    fluticasone (FLONASE) 50 MCG/ACT nasal spray 1 spray by Each Nostril route daily      cetirizine (ZYRTEC) 10 MG tablet Take 10 mg by mouth daily      dutasteride (AVODART) 0.5 MG capsule Take 0.5 mg by mouth daily      magnesium oxide (MAG-OX) 400 MG tablet Take 400 mg by mouth 2 times daily      atorvastatin (LIPITOR) 40 MG tablet Take 40 mg by mouth daily      amLODIPine (NORVASC) 10 MG tablet Take 10 mg by mouth daily      metFORMIN (GLUCOPHAGE) 1000 MG tablet Take 1,000 mg by mouth 2 times daily (with meals)      clopidogrel (PLAVIX) 75 MG tablet Take 75 mg by mouth daily      aspirin 81 MG EC tablet Take 81 mg by mouth daily      albuterol-ipratropium (COMBIVENT RESPIMAT)  MCG/ACT AERS inhaler Inhale 1 puff into the lungs 4 times daily      losartan (COZAAR) 100 MG tablet Take 100 mg by mouth daily. Glucosamine-Chondroitin (GLUCOSAMINE CHONDR COMPLEX PO) Take 1 tablet by mouth 2 times daily          Patient has no known allergies. Social History     Tobacco Use   Smoking Status Former    Types: Cigarettes    Quit date: 1980    Years since quittin.1   Smokeless Tobacco Never       Social History     Substance and Sexual Activity   Alcohol Use Yes    Alcohol/week: 1.0 standard drink    Types: 1 Cans of beer per week    Comment: occasionally       REVIEW OF SYSTEMS:  Constitutional: negative  Eyes: negative  Respiratory: negative  Cardiovascular: negative  Gastrointestinal: negative  Musculoskeletal: negative  Genitourinary: negative  Skin: negative   Neurological: negative  Hematological/Lymphatic: negative  Psychological: negative    Physical Exam:    This a 67 y.o. male   Vitals:    23 1029   BP: 110/70     Constitutional: Patient in no acute distress   Neuro: alert and oriented to person place and time. Psych: Mood and affect normal.  Head: atraumatic normocephalic  Eyes: EOMi  HEENT: neck supple, trachea midline  Lungs: Respiratory effort normal  Cardiovascular:  Normal peripheral pulses  Abdomen: Soft, non-tender, non-distended, No CVA  Bladder: non-tender and not distended.   FROMx4, no cyanosis clubbing edema  Skin: warm and dry      Assessment and Plan      1. BPH with urinary obstruction    2. Hematuria, unspecified type             Plan:      No follow-ups on file. BPH with LUTs - controlled after Greenlight PVP  - will start dutasteride to prevent regrowth.   - Cystoscopy with urethral dilation - on ISC  - on prophylactic Abx through [facility]

## 2023-03-24 ENCOUNTER — OFFICE VISIT (OUTPATIENT)
Dept: PULMONOLOGY | Age: 73
End: 2023-03-24
Payer: OTHER GOVERNMENT

## 2023-03-24 VITALS
HEIGHT: 70 IN | DIASTOLIC BLOOD PRESSURE: 82 MMHG | HEART RATE: 57 BPM | BODY MASS INDEX: 38.65 KG/M2 | SYSTOLIC BLOOD PRESSURE: 128 MMHG | OXYGEN SATURATION: 96 % | WEIGHT: 270 LBS | TEMPERATURE: 97.9 F

## 2023-03-24 DIAGNOSIS — Z87.891 PERSONAL HISTORY OF SMOKING: ICD-10-CM

## 2023-03-24 DIAGNOSIS — E66.9 OBESITY (BMI 30-39.9): ICD-10-CM

## 2023-03-24 DIAGNOSIS — J44.9 STAGE 2 MODERATE COPD BY GOLD CLASSIFICATION (HCC): Primary | ICD-10-CM

## 2023-03-24 PROCEDURE — 1123F ACP DISCUSS/DSCN MKR DOCD: CPT | Performed by: NURSE PRACTITIONER

## 2023-03-24 PROCEDURE — 99213 OFFICE O/P EST LOW 20 MIN: CPT | Performed by: NURSE PRACTITIONER

## 2023-03-24 ASSESSMENT — ENCOUNTER SYMPTOMS
VOMITING: 0
EYES NEGATIVE: 1
DIARRHEA: 0
NAUSEA: 0
CHEST TIGHTNESS: 0
ALLERGIC/IMMUNOLOGIC NEGATIVE: 1
GASTROINTESTINAL NEGATIVE: 1
STRIDOR: 0
RESPIRATORY NEGATIVE: 1
WHEEZING: 0

## 2023-03-24 NOTE — PROGRESS NOTES
[facility]    Patient: Ana Trejo, 67 y.o.   : 1950  3/24/2023    Pt of Dr. Christina Perez   Patient presents with    Follow-up     1 year pulmonary follow up, no testing           HPI  Abdi Cornejo is here for follow up for COPD  Last CT chest reveals a stable area of pleural plaque, calcification and pleural effusion which originally occurred during thoracotomy of OHS. No coughing, stable SOB  Swims at the [facility]  No significant weight loss  No pulmonary issues or concerns today in the office     Patient is experiencing SOB: no     Patient is experiencing wheezing: No     Patient states they have had a Absent = 4 cough. Phlegm is none     Patient is coughing up blood: no     Patient has been experiencing chest pains: non-existent     Spiriva and combivent working well. Progress History:   Since last visit any new medical issues? No  New ER or hospital visits? Yes- 22 ED visit UTI  Any new or changes in medicines? No  Using inhalers? Yes   Are they helpful? Yes   Flu vaccine- 10/1/22  Pneumonia vaccine?   Covid vaccine- done x 4    Past Medical hx   PMH:  Past Medical History:   Diagnosis Date    Arthritis     Back, knees    CAD (coronary artery disease)     CHF (congestive heart failure) (HCC)     \"CABG X5\"    COPD (chronic obstructive pulmonary disease) (HCC)     Hx of blood clots     Hyperlipidemia     Hypertension     PE (pulmonary embolism)     Pneumonia     pneumonia in      SURGICAL HISTORY:  Past Surgical History:   Procedure Laterality Date    CARDIAC SURGERY      CABG X5    COLONOSCOPY      CORONARY ARTERY BYPASS GRAFT  10/2003    [facility]    CYSTOSCOPY N/A 2022    CYSTOSCOPY URETHRAL DILATATION performed by Loreta Nicole MD at [address] [facility], COLON, DIAGNOSTIC      HEEL SPUR SURGERY Left     HEEL SPUR SURGERY Right     HYDROCELE EXCISION Right     INGUINAL HERNIA REPAIR Right

## 2023-03-24 NOTE — PROGRESS NOTES
Patient is experiencing SOB: no    Patient is experiencing wheezing: No    Patient states they have had a Absent = 4 cough. Phlegm is none    Patient is coughing up blood: no    Patient has been experiencing chest pains: non-existent    Spiriva and combivent working well.

## 2023-06-27 DIAGNOSIS — J44.9 STAGE 2 MODERATE COPD BY GOLD CLASSIFICATION (HCC): Primary | ICD-10-CM

## 2024-01-22 ENCOUNTER — NURSE ONLY (OUTPATIENT)
Dept: LAB | Age: 74
End: 2024-01-22

## 2024-01-22 LAB
ALBUMIN SERPL BCG-MCNC: 4.7 G/DL (ref 3.5–5.1)
ALP SERPL-CCNC: 79 U/L (ref 38–126)
ALT SERPL W/O P-5'-P-CCNC: 21 U/L (ref 11–66)
AST SERPL-CCNC: 18 U/L (ref 5–40)
BILIRUB CONJ SERPL-MCNC: < 0.2 MG/DL (ref 0–0.3)
BILIRUB SERPL-MCNC: 1.1 MG/DL (ref 0.3–1.2)
DEPRECATED RDW RBC AUTO: 40.7 FL (ref 35–45)
ERYTHROCYTE [DISTWIDTH] IN BLOOD BY AUTOMATED COUNT: 12.6 % (ref 11.5–14.5)
FERRITIN SERPL IA-MCNC: 250 NG/ML (ref 22–322)
FOLATE SERPL-MCNC: 15.2 NG/ML (ref 4.8–24.2)
HCT VFR BLD AUTO: 37.8 % (ref 42–52)
HGB BLD-MCNC: 13.1 GM/DL (ref 14–18)
IRON SERPL-MCNC: 62 UG/DL (ref 65–195)
MCH RBC QN AUTO: 30.7 PG (ref 26–33)
MCHC RBC AUTO-ENTMCNC: 34.7 GM/DL (ref 32.2–35.5)
MCV RBC AUTO: 88.5 FL (ref 80–94)
PLATELET # BLD AUTO: 143 THOU/MM3 (ref 130–400)
PMV BLD AUTO: 10.7 FL (ref 9.4–12.4)
PROT SERPL-MCNC: 7.6 G/DL (ref 6.1–8)
RBC # BLD AUTO: 4.27 MILL/MM3 (ref 4.7–6.1)
TIBC SERPL-MCNC: 274 UG/DL (ref 171–450)
VIT B12 SERPL-MCNC: 287 PG/ML (ref 211–911)
WBC # BLD AUTO: 4.4 THOU/MM3 (ref 4.8–10.8)

## 2024-03-27 ENCOUNTER — OFFICE VISIT (OUTPATIENT)
Dept: PULMONOLOGY | Age: 74
End: 2024-03-27
Payer: OTHER GOVERNMENT

## 2024-03-27 VITALS
WEIGHT: 271.8 LBS | HEART RATE: 60 BPM | SYSTOLIC BLOOD PRESSURE: 118 MMHG | HEIGHT: 70 IN | TEMPERATURE: 97.5 F | OXYGEN SATURATION: 95 % | BODY MASS INDEX: 38.91 KG/M2 | DIASTOLIC BLOOD PRESSURE: 68 MMHG

## 2024-03-27 DIAGNOSIS — Z87.891 PERSONAL HISTORY OF SMOKING: ICD-10-CM

## 2024-03-27 DIAGNOSIS — E66.9 OBESITY (BMI 30-39.9): ICD-10-CM

## 2024-03-27 DIAGNOSIS — J44.9 STAGE 2 MODERATE COPD BY GOLD CLASSIFICATION (HCC): Primary | ICD-10-CM

## 2024-03-27 PROCEDURE — 99213 OFFICE O/P EST LOW 20 MIN: CPT | Performed by: NURSE PRACTITIONER

## 2024-03-27 PROCEDURE — 1123F ACP DISCUSS/DSCN MKR DOCD: CPT | Performed by: NURSE PRACTITIONER

## 2024-03-27 RX ORDER — ALBUTEROL SULFATE 90 UG/1
2 AEROSOL, METERED RESPIRATORY (INHALATION) EVERY 4 HOURS PRN
Qty: 54 G | Refills: 3 | Status: SHIPPED | OUTPATIENT
Start: 2024-03-27

## 2024-03-27 ASSESSMENT — ENCOUNTER SYMPTOMS
EYES NEGATIVE: 1
CHEST TIGHTNESS: 0
ALLERGIC/IMMUNOLOGIC NEGATIVE: 1
GASTROINTESTINAL NEGATIVE: 1
WHEEZING: 0
VOMITING: 0
RESPIRATORY NEGATIVE: 1
NAUSEA: 0
STRIDOR: 0
DIARRHEA: 0

## 2024-03-27 NOTE — PROGRESS NOTES
[facility]    Patient: BRADLEY BECERRA, 73 y.o.   : 1950  3/27/2024    Pt of Dr. Blackwood     Subjective     Chief Complaint   Patient presents with    Follow-up     COPD 1 year f/u no testing.        HPI  Bradley is here for follow up for his moderate COPD.    No new pulmonary testing to review today   Remote smoking hx quit in   Current inhaler use of Spiriva daily and will stop Combivent and place patient just on Albuterol PRN   BMI 38  No home oxygen use last 6MWT done 2021  SOB only with exertion   Productive cough in the AM only   No wheezing     Progress History:   Since last visit any new medical issues? Yes hearth cath per Dr. Vuong coming up   New ER or hospital visits? No  Any new or changes in medicines? No  Using inhalers? Yes   Are they helpful? Yes   Immunization History   Administered Date(s) Administered    COVID-19, MODERNA Bivalent, (age 12y+), IM, 50 mcg/0.5 mL 10/24/2022    COVID-19, MODERNA, ( formula), (age 12y+), IM, 50mcg/0.5mL 2023       Tobacco Use      Smoking status: Former        Packs/day: 0.00        Types: Cigarettes        Quit date: 1980        Years since quittin.2      Smokeless tobacco: Never     Past Medical hx   PMH:  Past Medical History:   Diagnosis Date    Arthritis     Back, knees    CAD (coronary artery disease)     CHF (congestive heart failure) (HCC)     \"CABG X5\"    COPD (chronic obstructive pulmonary disease) (HCC)     Hx of blood clots     Hyperlipidemia     Hypertension     PE (pulmonary embolism)     Pneumonia     pneumonia in      SURGICAL HISTORY:  Past Surgical History:   Procedure Laterality Date    CARDIAC SURGERY      CABG X5    COLONOSCOPY      CORONARY ARTERY BYPASS GRAFT  10/2003    [facility]    CYSTOSCOPY N/A 2022    CYSTOSCOPY URETHRAL DILATATION performed by Sotero uDrbin Jr., MD at [facility] OR    ENDOSCOPY, COLON, DIAGNOSTIC      HEEL SPUR SURGERY Left     HEEL SPUR

## 2024-07-26 ENCOUNTER — APPOINTMENT (OUTPATIENT)
Dept: GENERAL RADIOLOGY | Age: 74
End: 2024-07-26
Payer: OTHER GOVERNMENT

## 2024-07-26 ENCOUNTER — APPOINTMENT (OUTPATIENT)
Dept: INTERVENTIONAL RADIOLOGY/VASCULAR | Age: 74
End: 2024-07-26
Payer: OTHER GOVERNMENT

## 2024-07-26 ENCOUNTER — APPOINTMENT (OUTPATIENT)
Dept: CT IMAGING | Age: 74
End: 2024-07-26
Payer: OTHER GOVERNMENT

## 2024-07-26 ENCOUNTER — HOSPITAL ENCOUNTER (INPATIENT)
Age: 74
LOS: 5 days | Discharge: HOME | End: 2024-08-01
Attending: OBSTETRICS & GYNECOLOGY | Admitting: INTERNAL MEDICINE
Payer: OTHER GOVERNMENT

## 2024-07-26 DIAGNOSIS — R57.8 HEMORRHAGIC SHOCK (HCC): ICD-10-CM

## 2024-07-26 DIAGNOSIS — Z95.2 S/P TAVR (TRANSCATHETER AORTIC VALVE REPLACEMENT): ICD-10-CM

## 2024-07-26 DIAGNOSIS — I72.9 PSEUDOANEURYSM FOLLOWING PROCEDURE (HCC): Primary | ICD-10-CM

## 2024-07-26 DIAGNOSIS — D62 ACUTE BLOOD LOSS ANEMIA: ICD-10-CM

## 2024-07-26 DIAGNOSIS — T81.718A PSEUDOANEURYSM FOLLOWING PROCEDURE (HCC): Primary | ICD-10-CM

## 2024-07-26 DIAGNOSIS — S70.12XA HEMATOMA OF LEFT THIGH, INITIAL ENCOUNTER: ICD-10-CM

## 2024-07-26 LAB
ABO: NORMAL
ANION GAP SERPL CALC-SCNC: 18 MEQ/L (ref 8–16)
ANTIBODY SCREEN: NORMAL
BASOPHILS ABSOLUTE: 0.1 THOU/MM3 (ref 0–0.1)
BASOPHILS NFR BLD AUTO: 0.8 %
BUN SERPL-MCNC: 14 MG/DL (ref 7–22)
CALCIUM SERPL-MCNC: 8.5 MG/DL (ref 8.5–10.5)
CHLORIDE SERPL-SCNC: 97 MEQ/L (ref 98–111)
CO2 SERPL-SCNC: 17 MEQ/L (ref 23–33)
CREAT SERPL-MCNC: 1 MG/DL (ref 0.4–1.2)
DEPRECATED RDW RBC AUTO: 42 FL (ref 35–45)
EOSINOPHIL NFR BLD AUTO: 1.5 %
EOSINOPHILS ABSOLUTE: 0.2 THOU/MM3 (ref 0–0.4)
ERYTHROCYTE [DISTWIDTH] IN BLOOD BY AUTOMATED COUNT: 13 % (ref 11.5–14.5)
GFR SERPL CREATININE-BSD FRML MDRD: 78 ML/MIN/1.73M2
GLUCOSE SERPL-MCNC: 296 MG/DL (ref 70–108)
HCT VFR BLD AUTO: 20 % (ref 42–52)
HCT VFR BLD AUTO: 25.6 % (ref 42–52)
HGB BLD-MCNC: 7 GM/DL (ref 14–18)
HGB BLD-MCNC: 8.7 GM/DL (ref 14–18)
IMM GRANULOCYTES # BLD AUTO: 0.15 THOU/MM3 (ref 0–0.07)
IMM GRANULOCYTES NFR BLD AUTO: 1.4 %
LACTATE SERPL-SCNC: 6.2 MMOL/L (ref 0.5–2)
LYMPHOCYTES ABSOLUTE: 4 THOU/MM3 (ref 1–4.8)
LYMPHOCYTES NFR BLD AUTO: 37.8 %
MCH RBC QN AUTO: 31.2 PG (ref 26–33)
MCHC RBC AUTO-ENTMCNC: 34 GM/DL (ref 32.2–35.5)
MCV RBC AUTO: 91.8 FL (ref 80–94)
MONOCYTES ABSOLUTE: 1 THOU/MM3 (ref 0.4–1.3)
MONOCYTES NFR BLD AUTO: 9.1 %
NEUTROPHILS ABSOLUTE: 5.2 THOU/MM3 (ref 1.8–7.7)
NEUTROPHILS NFR BLD AUTO: 49.4 %
NRBC BLD AUTO-RTO: 0 /100 WBC
NT-PROBNP SERPL IA-MCNC: 385.3 PG/ML (ref 0–124)
OSMOLALITY SERPL CALC.SUM OF ELEC: 276 MOSMOL/KG (ref 275–300)
PLATELET # BLD AUTO: 132 THOU/MM3 (ref 130–400)
PMV BLD AUTO: 11 FL (ref 9.4–12.4)
POTASSIUM SERPL-SCNC: 4.6 MEQ/L (ref 3.5–5.2)
RBC # BLD AUTO: 2.79 MILL/MM3 (ref 4.7–6.1)
RH FACTOR: NORMAL
SODIUM SERPL-SCNC: 132 MEQ/L (ref 135–145)
TROPONIN, HIGH SENSITIVITY: 21 NG/L (ref 0–12)
WBC # BLD AUTO: 10.5 THOU/MM3 (ref 4.8–10.8)

## 2024-07-26 PROCEDURE — 36415 COLL VENOUS BLD VENIPUNCTURE: CPT

## 2024-07-26 PROCEDURE — 96375 TX/PRO/DX INJ NEW DRUG ADDON: CPT

## 2024-07-26 PROCEDURE — 71045 X-RAY EXAM CHEST 1 VIEW: CPT

## 2024-07-26 PROCEDURE — C1894 INTRO/SHEATH, NON-LASER: HCPCS

## 2024-07-26 PROCEDURE — 80048 BASIC METABOLIC PNL TOTAL CA: CPT

## 2024-07-26 PROCEDURE — 3E043XZ INTRODUCTION OF VASOPRESSOR INTO CENTRAL VEIN, PERCUTANEOUS APPROACH: ICD-10-PCS | Performed by: INTERNAL MEDICINE

## 2024-07-26 PROCEDURE — 6360000004 HC RX CONTRAST MEDICATION: Performed by: EMERGENCY MEDICINE

## 2024-07-26 PROCEDURE — 37226 HC FEM POP TERR PLASTY AND STENT: CPT

## 2024-07-26 PROCEDURE — 6360000002 HC RX W HCPCS

## 2024-07-26 PROCEDURE — 75625 CONTRAST EXAM ABDOMINL AORTA: CPT

## 2024-07-26 PROCEDURE — P9016 RBC LEUKOCYTES REDUCED: HCPCS

## 2024-07-26 PROCEDURE — 2580000003 HC RX 258

## 2024-07-26 PROCEDURE — 75635 CT ANGIO ABDOMINAL ARTERIES: CPT

## 2024-07-26 PROCEDURE — 30233N1 TRANSFUSION OF NONAUTOLOGOUS RED BLOOD CELLS INTO PERIPHERAL VEIN, PERCUTANEOUS APPROACH: ICD-10-PCS | Performed by: INTERNAL MEDICINE

## 2024-07-26 PROCEDURE — 96368 THER/DIAG CONCURRENT INF: CPT

## 2024-07-26 PROCEDURE — 85027 COMPLETE CBC AUTOMATED: CPT

## 2024-07-26 PROCEDURE — 36556 INSERT NON-TUNNEL CV CATH: CPT

## 2024-07-26 PROCEDURE — 83880 ASSAY OF NATRIURETIC PEPTIDE: CPT

## 2024-07-26 PROCEDURE — B41D1ZZ FLUOROSCOPY OF AORTA AND BILATERAL LOWER EXTREMITY ARTERIES USING LOW OSMOLAR CONTRAST: ICD-10-PCS | Performed by: INTERNAL MEDICINE

## 2024-07-26 PROCEDURE — 30233L1 TRANSFUSION OF NONAUTOLOGOUS FRESH PLASMA INTO PERIPHERAL VEIN, PERCUTANEOUS APPROACH: ICD-10-PCS | Performed by: INTERNAL MEDICINE

## 2024-07-26 PROCEDURE — 86901 BLOOD TYPING SEROLOGIC RH(D): CPT

## 2024-07-26 PROCEDURE — 36620 INSERTION CATHETER ARTERY: CPT

## 2024-07-26 PROCEDURE — 96366 THER/PROPH/DIAG IV INF ADDON: CPT

## 2024-07-26 PROCEDURE — P9017 PLASMA 1 DONOR FRZ W/IN 8 HR: HCPCS

## 2024-07-26 PROCEDURE — 84484 ASSAY OF TROPONIN QUANT: CPT

## 2024-07-26 PROCEDURE — 99291 CRITICAL CARE FIRST HOUR: CPT

## 2024-07-26 PROCEDURE — 85018 HEMOGLOBIN: CPT

## 2024-07-26 PROCEDURE — 86850 RBC ANTIBODY SCREEN: CPT

## 2024-07-26 PROCEDURE — 75710 ARTERY X-RAYS ARM/LEG: CPT

## 2024-07-26 PROCEDURE — 71260 CT THORAX DX C+: CPT

## 2024-07-26 PROCEDURE — C1769 GUIDE WIRE: HCPCS

## 2024-07-26 PROCEDURE — 93005 ELECTROCARDIOGRAM TRACING: CPT | Performed by: EMERGENCY MEDICINE

## 2024-07-26 PROCEDURE — 30233K1 TRANSFUSION OF NONAUTOLOGOUS FROZEN PLASMA INTO PERIPHERAL VEIN, PERCUTANEOUS APPROACH: ICD-10-PCS | Performed by: INTERNAL MEDICINE

## 2024-07-26 PROCEDURE — 96365 THER/PROPH/DIAG IV INF INIT: CPT

## 2024-07-26 PROCEDURE — 02HV33Z INSERTION OF INFUSION DEVICE INTO SUPERIOR VENA CAVA, PERCUTANEOUS APPROACH: ICD-10-PCS | Performed by: INTERNAL MEDICINE

## 2024-07-26 PROCEDURE — 2500000003 HC RX 250 WO HCPCS

## 2024-07-26 PROCEDURE — 86923 COMPATIBILITY TEST ELECTRIC: CPT

## 2024-07-26 PROCEDURE — 75774 ARTERY X-RAY EACH VESSEL: CPT

## 2024-07-26 PROCEDURE — 96374 THER/PROPH/DIAG INJ IV PUSH: CPT

## 2024-07-26 PROCEDURE — 2580000003 HC RX 258: Performed by: EMERGENCY MEDICINE

## 2024-07-26 PROCEDURE — 6360000002 HC RX W HCPCS: Performed by: INTERNAL MEDICINE

## 2024-07-26 PROCEDURE — 96361 HYDRATE IV INFUSION ADD-ON: CPT

## 2024-07-26 PROCEDURE — 85025 COMPLETE CBC W/AUTO DIFF WBC: CPT

## 2024-07-26 PROCEDURE — 30233R1 TRANSFUSION OF NONAUTOLOGOUS PLATELETS INTO PERIPHERAL VEIN, PERCUTANEOUS APPROACH: ICD-10-PCS | Performed by: INTERNAL MEDICINE

## 2024-07-26 PROCEDURE — 86900 BLOOD TYPING SEROLOGIC ABO: CPT

## 2024-07-26 PROCEDURE — 85014 HEMATOCRIT: CPT

## 2024-07-26 PROCEDURE — 83605 ASSAY OF LACTIC ACID: CPT

## 2024-07-26 PROCEDURE — P9037 PLATE PHERES LEUKOREDU IRRAD: HCPCS

## 2024-07-26 PROCEDURE — 6360000002 HC RX W HCPCS: Performed by: EMERGENCY MEDICINE

## 2024-07-26 PROCEDURE — 04HL3DZ INSERTION OF INTRALUMINAL DEVICE INTO LEFT FEMORAL ARTERY, PERCUTANEOUS APPROACH: ICD-10-PCS | Performed by: INTERNAL MEDICINE

## 2024-07-26 RX ORDER — SODIUM CHLORIDE 9 MG/ML
INJECTION, SOLUTION INTRAVENOUS PRN
Status: DISCONTINUED | OUTPATIENT
Start: 2024-07-26 | End: 2024-07-30

## 2024-07-26 RX ORDER — CALCIUM CHLORIDE 100 MG/ML
1000 INJECTION INTRAVENOUS; INTRAVENTRICULAR PRN
Status: DISCONTINUED | OUTPATIENT
Start: 2024-07-26 | End: 2024-08-01 | Stop reason: HOSPADM

## 2024-07-26 RX ORDER — ASPIRIN 81 MG/1
324 TABLET, CHEWABLE ORAL ONCE
Status: DISCONTINUED | OUTPATIENT
Start: 2024-07-26 | End: 2024-08-01 | Stop reason: HOSPADM

## 2024-07-26 RX ORDER — ONDANSETRON 2 MG/ML
4 INJECTION INTRAMUSCULAR; INTRAVENOUS ONCE
Status: COMPLETED | OUTPATIENT
Start: 2024-07-26 | End: 2024-07-26

## 2024-07-26 RX ORDER — ALBUTEROL SULFATE 90 UG/1
2 AEROSOL, METERED RESPIRATORY (INHALATION) ONCE
Status: DISCONTINUED | OUTPATIENT
Start: 2024-07-27 | End: 2024-08-01 | Stop reason: HOSPADM

## 2024-07-26 RX ORDER — FENTANYL CITRATE 50 UG/ML
INJECTION, SOLUTION INTRAMUSCULAR; INTRAVENOUS
Status: COMPLETED
Start: 2024-07-26 | End: 2024-07-26

## 2024-07-26 RX ORDER — 0.9 % SODIUM CHLORIDE 0.9 %
500 INTRAVENOUS SOLUTION INTRAVENOUS ONCE
Status: COMPLETED | OUTPATIENT
Start: 2024-07-26 | End: 2024-07-26

## 2024-07-26 RX ORDER — NOREPINEPHRINE BITARTRATE 0.06 MG/ML
1-100 INJECTION, SOLUTION INTRAVENOUS CONTINUOUS
Status: DISCONTINUED | OUTPATIENT
Start: 2024-07-26 | End: 2024-07-28

## 2024-07-26 RX ORDER — EPINEPHRINE 0.1 MG/ML
INJECTION INTRAVENOUS
Status: DISPENSED
Start: 2024-07-26 | End: 2024-07-27

## 2024-07-26 RX ORDER — FENTANYL CITRATE 50 UG/ML
50 INJECTION, SOLUTION INTRAMUSCULAR; INTRAVENOUS ONCE
Status: COMPLETED | OUTPATIENT
Start: 2024-07-26 | End: 2024-07-26

## 2024-07-26 RX ORDER — 0.9 % SODIUM CHLORIDE 0.9 %
1000 INTRAVENOUS SOLUTION INTRAVENOUS ONCE
Status: COMPLETED | OUTPATIENT
Start: 2024-07-26 | End: 2024-07-26

## 2024-07-26 RX ORDER — BUMETANIDE 0.25 MG/ML
1 INJECTION INTRAMUSCULAR; INTRAVENOUS ONCE
Status: COMPLETED | OUTPATIENT
Start: 2024-07-27 | End: 2024-07-27

## 2024-07-26 RX ORDER — FENTANYL CITRATE 50 UG/ML
INJECTION, SOLUTION INTRAMUSCULAR; INTRAVENOUS PRN
Status: COMPLETED | OUTPATIENT
Start: 2024-07-26 | End: 2024-07-26

## 2024-07-26 RX ADMIN — IOPAMIDOL 80 ML: 755 INJECTION, SOLUTION INTRAVENOUS at 18:40

## 2024-07-26 RX ADMIN — ONDANSETRON 4 MG: 2 INJECTION INTRAMUSCULAR; INTRAVENOUS at 20:11

## 2024-07-26 RX ADMIN — VASOPRESSIN 0.03 UNITS/MIN: 20 INJECTION INTRAVENOUS at 22:31

## 2024-07-26 RX ADMIN — SODIUM CHLORIDE 1000 ML: 9 INJECTION, SOLUTION INTRAVENOUS at 22:08

## 2024-07-26 RX ADMIN — FENTANYL CITRATE 50 MCG/ML: 50 INJECTION, SOLUTION INTRAMUSCULAR; INTRAVENOUS at 23:17

## 2024-07-26 RX ADMIN — FENTANYL CITRATE 50 MCG: 50 INJECTION, SOLUTION INTRAMUSCULAR; INTRAVENOUS at 18:30

## 2024-07-26 RX ADMIN — FENTANYL CITRATE 50 MCG: 50 INJECTION, SOLUTION INTRAMUSCULAR; INTRAVENOUS at 23:54

## 2024-07-26 RX ADMIN — SODIUM CHLORIDE 500 ML: 9 INJECTION, SOLUTION INTRAVENOUS at 18:09

## 2024-07-26 RX ADMIN — FENTANYL CITRATE 50 MCG: 50 INJECTION, SOLUTION INTRAMUSCULAR; INTRAVENOUS at 18:05

## 2024-07-26 RX ADMIN — SODIUM BICARBONATE: 84 INJECTION, SOLUTION INTRAVENOUS at 22:31

## 2024-07-26 RX ADMIN — Medication 5 MCG/MIN: at 19:33

## 2024-07-26 ASSESSMENT — PAIN SCALES - GENERAL
PAINLEVEL_OUTOF10: 4
PAINLEVEL_OUTOF10: 10
PAINLEVEL_OUTOF10: 10

## 2024-07-26 ASSESSMENT — PAIN - FUNCTIONAL ASSESSMENT: PAIN_FUNCTIONAL_ASSESSMENT: 0-10

## 2024-07-26 ASSESSMENT — PAIN DESCRIPTION - ORIENTATION: ORIENTATION: LEFT;UPPER

## 2024-07-26 ASSESSMENT — PAIN DESCRIPTION - LOCATION: LOCATION: LEG;GROIN

## 2024-07-26 NOTE — ED NOTES
Pt resting in bed. Pt states that he feels like he is going to faint. Pt provided cool wash clothes on forehead and neck. Pt leaving for CT at this time. Family at bedside. Vitals collected. Call light in reach.

## 2024-07-26 NOTE — ED NOTES
Pt is resting in bed with wife at bedside. Pt states if he doesn't move pain is not too bad, pt states when he talks he feels he gets SOB. Pt is breathing unlabored on 5L NC. Call light within reach. Provider at bedside discussing POC with pt and family.

## 2024-07-26 NOTE — ED PROVIDER NOTES
[facility] EMERGENCY DEPT  EMERGENCY DEPARTMENT ENCOUNTER          Pt Name: Jon Ji  MRN: [id number]  Birthdate 1950  Date of evaluation: 7/26/2024  Physician: Priyanka Ortega MD  Supervising Attending Physician: Ethan Pinto DO       CHIEF COMPLAINT       Chief Complaint   Patient presents with    Shortness of Breath         HISTORY OF PRESENT ILLNESS    HPI  Jon Ji is a 73 y.o. male who presents to the emergency department from home, by private vehicle for evaluation of shortness of breath and hematoma of his left thigh.  Patient is s/p operative TAVR done on Monday the 22nd.  Patient has been afebrile and has not had any chest pain, headache or shortness of breath before this.  Patient's left thigh has ecchymosis as well as some ecchymosis on the right side.  Upon contact with patient's surgeon, patient had a 6 Fr sheath inserted into his left thigh for hemodynamic monitoring and TAVR done from the right thigh.  Patient's hematoma has continued getting bigger and his left thigh is nontender cough.  He still has bilateral pulses as well as sensation and motor strength.  Patient is afebrile, tachypneic and hypotensive.  Patient has a history of coronary artery disease, CHF, COPD and pulmonary embolus.  Patient also has a history of blood clots, hyperlipidemia and hypertension.  The patient has no other acute complaints at this time.      PAST MEDICAL AND SURGICAL HISTORY     Past Medical History:   Diagnosis Date    Arthritis     Back, knees    CAD (coronary artery disease)     CHF (congestive heart failure) ([facility])     \"CABG X5\"    COPD (chronic obstructive pulmonary disease) ([facility])     Hx of blood clots     Hyperlipidemia     Hypertension     PE (pulmonary embolism) 2003    Pneumonia     pneumonia in 2003     Past Surgical History:   Procedure Laterality Date    CARDIAC SURGERY      CABG X5    COLONOSCOPY      CORONARY ARTERY BYPASS GRAFT  10/2003    [facility]    CYSTOSCOPY  Ultrasound guidance timing: prior to insertion and real time      Sterile ultrasound techniques: Sterile gel and sterile probe covers were used      Number of attempts:  1    Successful placement: yes    Post-procedure details:     Post-procedure:  Dressing applied and line sutured    Assessment:  Blood return through all ports, no pneumothorax on x-ray, free fluid flow and placement verified by x-ray    Procedure completion:  Tolerated  Arterial Line    Date/Time: 7/26/2024 11:21 PM    Performed by: Ethan Pinto DO  Authorized by: Ethan Pinto DO    Consent:     Consent obtained:  Verbal and emergent situation    Consent given by:  Patient and spouse    Risks discussed:  Bleeding, infection and pain  Universal protocol:     Procedure explained and questions answered to patient or proxy's satisfaction: yes      Relevant documents present and verified: yes      Test results available: no      Imaging studies available: no      Required blood products, implants, devices, and special equipment available: yes      Site/side marked: no      Immediately prior to procedure, a time out was called: no      Patient identity confirmed:  Hospital-assigned identification number and arm band  Indications:     Indications: hemodynamic monitoring and multiple ABGs    Pre-procedure details:     Skin preparation:  Povidone-iodine  Sedation:     Sedation type:  None  Anesthesia:     Anesthesia method:  Local infiltration    Local anesthetic:  Lidocaine 1% w/o epi  Procedure details:     Location:  L radial    Popeye's test performed: no      Needle gauge:  18 G    Placement technique:  Ultrasound guided    Number of attempts:  1  Post-procedure details:     Post-procedure:  Biopatch applied, sterile dressing applied, secured with tape and sutured    CMS:  Unable to assess    Procedure completion:  Tolerated well, no immediate complications  Critical Care    Performed by: Ethan Pinto DO  Authorized by: Ryan

## 2024-07-26 NOTE — ED NOTES
Pt to ED via EMS w/ report of sudden onset of shortness of breath. EMS report that the SOB started 1 hour ago. EMS report that pt had a procedure done Monday using his groin. Pt presents w/ hematoma on right groin. EMS report that they gave 324 ASA enroute. EKG completed upon arrival.

## 2024-07-26 NOTE — ED NOTES
This RN in pt's room to assist with boost in bed, pt states \"I feel like I am going to pass out\" pt BP soft at this time 87/51 MAP 62 provider made aware. Pt BP at this time 108/52 MAP 69. Pt states he feels warm and is thirsty, this RN to get pt mouth swabs and ice packs at this time. Pt A&O x4.

## 2024-07-27 ENCOUNTER — APPOINTMENT (OUTPATIENT)
Age: 74
End: 2024-07-27
Payer: OTHER GOVERNMENT

## 2024-07-27 ENCOUNTER — APPOINTMENT (OUTPATIENT)
Dept: GENERAL RADIOLOGY | Age: 74
End: 2024-07-27
Payer: OTHER GOVERNMENT

## 2024-07-27 PROBLEM — R57.8 HEMORRHAGIC SHOCK (HCC): Status: ACTIVE | Noted: 2024-07-27

## 2024-07-27 LAB
ALBUMIN SERPL BCG-MCNC: 3.3 G/DL (ref 3.5–5.1)
ALP SERPL-CCNC: 51 U/L (ref 38–126)
ALT SERPL W/O P-5'-P-CCNC: 24 U/L (ref 11–66)
ANION GAP SERPL CALC-SCNC: 11 MEQ/L (ref 8–16)
ANION GAP SERPL CALC-SCNC: 14 MEQ/L (ref 8–16)
ANION GAP SERPL CALC-SCNC: 23 MEQ/L (ref 8–16)
APTT PPP: 32.1 SECONDS (ref 22–38)
ARTERIAL PATENCY WRIST A: ABNORMAL
AST SERPL-CCNC: 63 U/L (ref 5–40)
BASE EXCESS BLDA CALC-SCNC: -15.9 MMOL/L (ref -2.5–2.5)
BASE EXCESS BLDA CALC-SCNC: -5.7 MMOL/L (ref -2.5–2.5)
BASOPHILS ABSOLUTE: 0 THOU/MM3 (ref 0–0.1)
BASOPHILS NFR BLD AUTO: 0.3 %
BASOPHILS NFR BLD AUTO: 0.4 %
BDY SITE: ABNORMAL
BDY SITE: ABNORMAL
BILIRUB CONJ SERPL-MCNC: 0.4 MG/DL (ref 0.1–13.8)
BILIRUB SERPL-MCNC: 1.1 MG/DL (ref 0.3–1.2)
BUN SERPL-MCNC: 16 MG/DL (ref 7–22)
BUN SERPL-MCNC: 17 MG/DL (ref 7–22)
BUN SERPL-MCNC: 18 MG/DL (ref 7–22)
CA-I BLD ISE-SCNC: 0.95 MMOL/L (ref 1.12–1.32)
CA-I BLD ISE-SCNC: 1.1 MMOL/L (ref 1.12–1.32)
CALCIUM SERPL-MCNC: 6.8 MG/DL (ref 8.5–10.5)
CALCIUM SERPL-MCNC: 7.4 MG/DL (ref 8.5–10.5)
CALCIUM SERPL-MCNC: 8 MG/DL (ref 8.5–10.5)
CHLORIDE SERPL-SCNC: 100 MEQ/L (ref 98–111)
CHLORIDE SERPL-SCNC: 101 MEQ/L (ref 98–111)
CHLORIDE SERPL-SCNC: 101 MEQ/L (ref 98–111)
CK SERPL-CCNC: 353 U/L (ref 55–170)
CO2 SERPL-SCNC: 17 MEQ/L (ref 23–33)
CO2 SERPL-SCNC: 21 MEQ/L (ref 23–33)
CO2 SERPL-SCNC: 9 MEQ/L (ref 23–33)
COLLECTED BY:: ABNORMAL
COLLECTED BY:: ABNORMAL
CREAT SERPL-MCNC: 1.1 MG/DL (ref 0.4–1.2)
CREAT SERPL-MCNC: 1.1 MG/DL (ref 0.4–1.2)
CREAT SERPL-MCNC: 1.2 MG/DL (ref 0.4–1.2)
DEPRECATED MEAN GLUCOSE BLD GHB EST-ACNC: 99 MG/DL (ref 70–126)
DEPRECATED RDW RBC AUTO: 44.8 FL (ref 35–45)
DEPRECATED RDW RBC AUTO: 44.9 FL (ref 35–45)
DEPRECATED RDW RBC AUTO: 45.7 FL (ref 35–45)
DEPRECATED RDW RBC AUTO: 46 FL (ref 35–45)
DEPRECATED RDW RBC AUTO: 46.5 FL (ref 35–45)
DEPRECATED RDW RBC AUTO: 51.7 FL (ref 35–45)
DEVICE: ABNORMAL
DEVICE: ABNORMAL
ECHO AO ASC DIAM: 3.6 CM
ECHO AR MAX VEL PISA: 5.1 M/S
ECHO AV ACCELERATION TIME: 99 MS
ECHO AV AREA PEAK VELOCITY: 1.7 CM2
ECHO AV AREA VTI: 1.9 CM2
ECHO AV MEAN GRADIENT: 16 MMHG
ECHO AV MEAN VELOCITY: 1.9 M/S
ECHO AV PEAK GRADIENT: 25 MMHG
ECHO AV PEAK VELOCITY: 2.5 M/S
ECHO AV REGURGITANT PHT: 893 MS
ECHO AV VELOCITY RATIO: 0.4
ECHO AV VTI: 42.3 CM
ECHO EST RA PRESSURE: 10 MMHG
ECHO LA AREA 4C: 23.2 CM2
ECHO LA DIAMETER: 3.5 CM
ECHO LA MAJOR AXIS: 6.5 CM
ECHO LA VOL MOD A4C: 65 ML (ref 18–58)
ECHO LV FRACTIONAL SHORTENING: 29 % (ref 28–44)
ECHO LV INTERNAL DIMENSION DIASTOLIC: 5.5 CM (ref 4.2–5.9)
ECHO LV INTERNAL DIMENSION SYSTOLIC: 3.9 CM
ECHO LV ISOVOLUMETRIC RELAXATION TIME (IVRT): 95 MS
ECHO LV IVSD: 1.2 CM (ref 0.6–1)
ECHO LV MASS 2D: 410.1 G (ref 88–224)
ECHO LV POSTERIOR WALL DIASTOLIC: 2 CM (ref 0.6–1)
ECHO LV RELATIVE WALL THICKNESS RATIO: 0.73
ECHO LVOT AREA: 4.2 CM2
ECHO LVOT AV VTI INDEX: 0.45
ECHO LVOT DIAM: 2.3 CM
ECHO LVOT MEAN GRADIENT: 2 MMHG
ECHO LVOT PEAK GRADIENT: 4 MMHG
ECHO LVOT PEAK VELOCITY: 1 M/S
ECHO LVOT SV: 79.7 ML
ECHO LVOT VTI: 19.2 CM
ECHO MV A VELOCITY: 0.82 M/S
ECHO MV E DECELERATION TIME (DT): 218 MS
ECHO MV E VELOCITY: 0.61 M/S
ECHO MV E/A RATIO: 0.74
ECHO PV MAX VELOCITY: 0.8 M/S
ECHO PV PEAK GRADIENT: 3 MMHG
ECHO RIGHT VENTRICULAR SYSTOLIC PRESSURE (RVSP): 36 MMHG
ECHO RV INTERNAL DIMENSION: 3.1 CM
ECHO TV E WAVE: 0.5 M/S
ECHO TV REGURGITANT MAX VELOCITY: 2.57 M/S
ECHO TV REGURGITANT PEAK GRADIENT: 26 MMHG
EKG ATRIAL RATE: 102 BPM
EKG ATRIAL RATE: 90 BPM
EKG P AXIS: 60 DEGREES
EKG P AXIS: 61 DEGREES
EKG P-R INTERVAL: 166 MS
EKG P-R INTERVAL: 170 MS
EKG Q-T INTERVAL: 326 MS
EKG Q-T INTERVAL: 338 MS
EKG QRS DURATION: 80 MS
EKG QRS DURATION: 88 MS
EKG QTC CALCULATION (BAZETT): 413 MS
EKG QTC CALCULATION (BAZETT): 424 MS
EKG R AXIS: -7 DEGREES
EKG R AXIS: 0 DEGREES
EKG T AXIS: 104 DEGREES
EKG T AXIS: 98 DEGREES
EKG VENTRICULAR RATE: 102 BPM
EKG VENTRICULAR RATE: 90 BPM
EOSINOPHIL NFR BLD AUTO: 0 %
EOSINOPHIL NFR BLD AUTO: 0 %
EOSINOPHIL NFR BLD AUTO: 0.2 %
EOSINOPHIL NFR BLD AUTO: 4.3 %
EOSINOPHILS ABSOLUTE: 0 THOU/MM3 (ref 0–0.4)
EOSINOPHILS ABSOLUTE: 0.4 THOU/MM3 (ref 0–0.4)
ERYTHROCYTE [DISTWIDTH] IN BLOOD BY AUTOMATED COUNT: 13.7 % (ref 11.5–14.5)
ERYTHROCYTE [DISTWIDTH] IN BLOOD BY AUTOMATED COUNT: 13.7 % (ref 11.5–14.5)
ERYTHROCYTE [DISTWIDTH] IN BLOOD BY AUTOMATED COUNT: 13.9 % (ref 11.5–14.5)
ERYTHROCYTE [DISTWIDTH] IN BLOOD BY AUTOMATED COUNT: 14 % (ref 11.5–14.5)
ERYTHROCYTE [DISTWIDTH] IN BLOOD BY AUTOMATED COUNT: 14.1 % (ref 11.5–14.5)
ERYTHROCYTE [DISTWIDTH] IN BLOOD BY AUTOMATED COUNT: 14.4 % (ref 11.5–14.5)
FIBRINOGEN PPP-MCNC: 179 MG/100ML (ref 155–475)
FIO2 ON VENT O2 ANALYZER: 6 %
GFR SERPL CREATININE-BSD FRML MDRD: 63 ML/MIN/1.73M2
GFR SERPL CREATININE-BSD FRML MDRD: 70 ML/MIN/1.73M2
GFR SERPL CREATININE-BSD FRML MDRD: 70 ML/MIN/1.73M2
GLUCOSE BLD STRIP.AUTO-MCNC: 231 MG/DL (ref 70–108)
GLUCOSE BLD STRIP.AUTO-MCNC: 237 MG/DL (ref 70–108)
GLUCOSE BLD STRIP.AUTO-MCNC: 256 MG/DL (ref 70–108)
GLUCOSE BLD STRIP.AUTO-MCNC: 287 MG/DL (ref 70–108)
GLUCOSE SERPL-MCNC: 270 MG/DL (ref 70–108)
GLUCOSE SERPL-MCNC: 365 MG/DL (ref 70–108)
GLUCOSE SERPL-MCNC: 473 MG/DL (ref 70–108)
HBA1C MFR BLD HPLC: 5.3 % (ref 4.4–6.4)
HCO3 BLDA-SCNC: 12 MMOL/L (ref 23–28)
HCO3 BLDA-SCNC: 19 MMOL/L (ref 23–28)
HCT VFR BLD AUTO: 26 % (ref 42–52)
HCT VFR BLD AUTO: 28.2 % (ref 42–52)
HCT VFR BLD AUTO: 29 % (ref 42–52)
HCT VFR BLD AUTO: 29.9 % (ref 42–52)
HCT VFR BLD AUTO: 30.7 % (ref 42–52)
HCT VFR BLD AUTO: 35.2 % (ref 42–52)
HGB BLD-MCNC: 10.1 GM/DL (ref 14–18)
HGB BLD-MCNC: 10.3 GM/DL (ref 14–18)
HGB BLD-MCNC: 10.6 GM/DL (ref 14–18)
HGB BLD-MCNC: 11.1 GM/DL (ref 14–18)
HGB BLD-MCNC: 8.9 GM/DL (ref 14–18)
HGB BLD-MCNC: 9.7 GM/DL (ref 14–18)
IMM GRANULOCYTES # BLD AUTO: 0.07 THOU/MM3 (ref 0–0.07)
IMM GRANULOCYTES # BLD AUTO: 0.09 THOU/MM3 (ref 0–0.07)
IMM GRANULOCYTES # BLD AUTO: 0.12 THOU/MM3 (ref 0–0.07)
IMM GRANULOCYTES # BLD AUTO: 0.21 THOU/MM3 (ref 0–0.07)
IMM GRANULOCYTES NFR BLD AUTO: 0.7 %
IMM GRANULOCYTES NFR BLD AUTO: 0.9 %
IMM GRANULOCYTES NFR BLD AUTO: 1.1 %
IMM GRANULOCYTES NFR BLD AUTO: 1.6 %
INR PPP: 1.4 (ref 0.85–1.13)
LACTATE BLD-SCNC: 7.7 MMOL/L (ref 0.5–1.9)
LACTATE SERPL-SCNC: 11.2 MMOL/L (ref 0.5–2)
LACTATE SERPL-SCNC: 4.8 MMOL/L (ref 0.5–2)
LYMPHOCYTES ABSOLUTE: 0.7 THOU/MM3 (ref 1–4.8)
LYMPHOCYTES ABSOLUTE: 0.8 THOU/MM3 (ref 1–4.8)
LYMPHOCYTES ABSOLUTE: 0.9 THOU/MM3 (ref 1–4.8)
LYMPHOCYTES ABSOLUTE: 0.9 THOU/MM3 (ref 1–4.8)
LYMPHOCYTES NFR BLD AUTO: 5.5 %
LYMPHOCYTES NFR BLD AUTO: 7 %
LYMPHOCYTES NFR BLD AUTO: 8.4 %
LYMPHOCYTES NFR BLD AUTO: 8.5 %
MAGNESIUM SERPL-MCNC: 1.5 MG/DL (ref 1.6–2.4)
MAGNESIUM SERPL-MCNC: 2 MG/DL (ref 1.6–2.4)
MCH RBC QN AUTO: 31.1 PG (ref 26–33)
MCH RBC QN AUTO: 31.3 PG (ref 26–33)
MCH RBC QN AUTO: 31.3 PG (ref 26–33)
MCH RBC QN AUTO: 31.4 PG (ref 26–33)
MCH RBC QN AUTO: 31.5 PG (ref 26–33)
MCH RBC QN AUTO: 31.7 PG (ref 26–33)
MCHC RBC AUTO-ENTMCNC: 31.5 GM/DL (ref 32.2–35.5)
MCHC RBC AUTO-ENTMCNC: 34.2 GM/DL (ref 32.2–35.5)
MCHC RBC AUTO-ENTMCNC: 34.4 GM/DL (ref 32.2–35.5)
MCHC RBC AUTO-ENTMCNC: 34.4 GM/DL (ref 32.2–35.5)
MCHC RBC AUTO-ENTMCNC: 34.5 GM/DL (ref 32.2–35.5)
MCHC RBC AUTO-ENTMCNC: 34.8 GM/DL (ref 32.2–35.5)
MCV RBC AUTO: 90.1 FL (ref 80–94)
MCV RBC AUTO: 90.3 FL (ref 80–94)
MCV RBC AUTO: 91.5 FL (ref 80–94)
MCV RBC AUTO: 91.6 FL (ref 80–94)
MCV RBC AUTO: 91.9 FL (ref 80–94)
MCV RBC AUTO: 99.2 FL (ref 80–94)
MONOCYTES ABSOLUTE: 1 THOU/MM3 (ref 0.4–1.3)
MONOCYTES ABSOLUTE: 1 THOU/MM3 (ref 0.4–1.3)
MONOCYTES ABSOLUTE: 1.1 THOU/MM3 (ref 0.4–1.3)
MONOCYTES ABSOLUTE: 1.1 THOU/MM3 (ref 0.4–1.3)
MONOCYTES NFR BLD AUTO: 10.1 %
MONOCYTES NFR BLD AUTO: 8.4 %
MONOCYTES NFR BLD AUTO: 9.3 %
MONOCYTES NFR BLD AUTO: 9.8 %
NEUTROPHILS ABSOLUTE: 10.8 THOU/MM3 (ref 1.8–7.7)
NEUTROPHILS ABSOLUTE: 8 THOU/MM3 (ref 1.8–7.7)
NEUTROPHILS ABSOLUTE: 8.1 THOU/MM3 (ref 1.8–7.7)
NEUTROPHILS ABSOLUTE: 9.2 THOU/MM3 (ref 1.8–7.7)
NEUTROPHILS NFR BLD AUTO: 76.7 %
NEUTROPHILS NFR BLD AUTO: 80.2 %
NEUTROPHILS NFR BLD AUTO: 81.8 %
NEUTROPHILS NFR BLD AUTO: 84.2 %
NRBC BLD AUTO-RTO: 0 /100 WBC
OSMOLALITY SERPL CALC.SUM OF ELEC: 288.4 MOSMOL/KG (ref 275–300)
PCO2 BLDA: 32 MMHG (ref 35–45)
PCO2 BLDA: 34 MMHG (ref 35–45)
PH BLDA: 7.15 [PH] (ref 7.35–7.45)
PH BLDA: 7.38 [PH] (ref 7.35–7.45)
PHOSPHATE SERPL-MCNC: 4.3 MG/DL (ref 2.4–4.7)
PHOSPHATE SERPL-MCNC: 4.7 MG/DL (ref 2.4–4.7)
PLATELET # BLD AUTO: 60 THOU/MM3 (ref 130–400)
PLATELET # BLD AUTO: 61 THOU/MM3 (ref 130–400)
PLATELET # BLD AUTO: 65 THOU/MM3 (ref 130–400)
PLATELET # BLD AUTO: 66 THOU/MM3 (ref 130–400)
PLATELET # BLD AUTO: 68 THOU/MM3 (ref 130–400)
PLATELET # BLD AUTO: 69 THOU/MM3 (ref 130–400)
PMV BLD AUTO: 10.7 FL (ref 9.4–12.4)
PMV BLD AUTO: 10.8 FL (ref 9.4–12.4)
PMV BLD AUTO: 11.3 FL (ref 9.4–12.4)
PMV BLD AUTO: 11.4 FL (ref 9.4–12.4)
PO2 BLDA: 117 MMHG (ref 71–104)
PO2 BLDA: 122 MMHG (ref 71–104)
POTASSIUM SERPL-SCNC: 5 MEQ/L (ref 3.5–5.2)
POTASSIUM SERPL-SCNC: 5.1 MEQ/L (ref 3.5–5.2)
POTASSIUM SERPL-SCNC: 5.4 MEQ/L (ref 3.5–5.2)
PROT SERPL-MCNC: 5.4 G/DL (ref 6.1–8)
RBC # BLD AUTO: 2.84 MILL/MM3 (ref 4.7–6.1)
RBC # BLD AUTO: 3.08 MILL/MM3 (ref 4.7–6.1)
RBC # BLD AUTO: 3.22 MILL/MM3 (ref 4.7–6.1)
RBC # BLD AUTO: 3.31 MILL/MM3 (ref 4.7–6.1)
RBC # BLD AUTO: 3.34 MILL/MM3 (ref 4.7–6.1)
RBC # BLD AUTO: 3.55 MILL/MM3 (ref 4.7–6.1)
SAO2 % BLDA: 98 %
SAO2 % BLDA: 99 %
SCAN OF BLOOD SMEAR: NORMAL
SODIUM SERPL-SCNC: 131 MEQ/L (ref 135–145)
SODIUM SERPL-SCNC: 133 MEQ/L (ref 135–145)
SODIUM SERPL-SCNC: 133 MEQ/L (ref 135–145)
VENTILATION MODE VENT: ABNORMAL
VENTILATION MODE VENT: ABNORMAL
WBC # BLD AUTO: 10.1 THOU/MM3 (ref 4.8–10.8)
WBC # BLD AUTO: 10.4 THOU/MM3 (ref 4.8–10.8)
WBC # BLD AUTO: 11.2 THOU/MM3 (ref 4.8–10.8)
WBC # BLD AUTO: 12.8 THOU/MM3 (ref 4.8–10.8)
WBC # BLD AUTO: 13.9 THOU/MM3 (ref 4.8–10.8)
WBC # BLD AUTO: 18.4 THOU/MM3 (ref 4.8–10.8)

## 2024-07-27 PROCEDURE — 6360000002 HC RX W HCPCS

## 2024-07-27 PROCEDURE — 85384 FIBRINOGEN ACTIVITY: CPT

## 2024-07-27 PROCEDURE — 94761 N-INVAS EAR/PLS OXIMETRY MLT: CPT

## 2024-07-27 PROCEDURE — 85027 COMPLETE CBC AUTOMATED: CPT

## 2024-07-27 PROCEDURE — 83735 ASSAY OF MAGNESIUM: CPT

## 2024-07-27 PROCEDURE — 85025 COMPLETE CBC W/AUTO DIFF WBC: CPT

## 2024-07-27 PROCEDURE — 6370000000 HC RX 637 (ALT 250 FOR IP)

## 2024-07-27 PROCEDURE — 2000000000 HC ICU R&B

## 2024-07-27 PROCEDURE — 82248 BILIRUBIN DIRECT: CPT

## 2024-07-27 PROCEDURE — 85730 THROMBOPLASTIN TIME PARTIAL: CPT

## 2024-07-27 PROCEDURE — 83605 ASSAY OF LACTIC ACID: CPT

## 2024-07-27 PROCEDURE — 82948 REAGENT STRIP/BLOOD GLUCOSE: CPT

## 2024-07-27 PROCEDURE — 2700000000 HC OXYGEN THERAPY PER DAY

## 2024-07-27 PROCEDURE — 71045 X-RAY EXAM CHEST 1 VIEW: CPT

## 2024-07-27 PROCEDURE — 6360000002 HC RX W HCPCS: Performed by: INTERNAL MEDICINE

## 2024-07-27 PROCEDURE — 84100 ASSAY OF PHOSPHORUS: CPT

## 2024-07-27 PROCEDURE — 37799 UNLISTED PX VASCULAR SURGERY: CPT

## 2024-07-27 PROCEDURE — 82550 ASSAY OF CK (CPK): CPT

## 2024-07-27 PROCEDURE — 82595 ASSAY OF CRYOGLOBULIN: CPT

## 2024-07-27 PROCEDURE — 82803 BLOOD GASES ANY COMBINATION: CPT

## 2024-07-27 PROCEDURE — 2580000003 HC RX 258

## 2024-07-27 PROCEDURE — 85610 PROTHROMBIN TIME: CPT

## 2024-07-27 PROCEDURE — 36415 COLL VENOUS BLD VENIPUNCTURE: CPT

## 2024-07-27 PROCEDURE — 94640 AIRWAY INHALATION TREATMENT: CPT

## 2024-07-27 PROCEDURE — 93306 TTE W/DOPPLER COMPLETE: CPT

## 2024-07-27 PROCEDURE — 80053 COMPREHEN METABOLIC PANEL: CPT

## 2024-07-27 PROCEDURE — 82330 ASSAY OF CALCIUM: CPT

## 2024-07-27 PROCEDURE — 83036 HEMOGLOBIN GLYCOSYLATED A1C: CPT

## 2024-07-27 PROCEDURE — 2500000003 HC RX 250 WO HCPCS

## 2024-07-27 RX ORDER — CLOPIDOGREL BISULFATE 75 MG/1
75 TABLET ORAL DAILY
Status: DISCONTINUED | OUTPATIENT
Start: 2024-07-27 | End: 2024-08-01 | Stop reason: HOSPADM

## 2024-07-27 RX ORDER — NITROGLYCERIN 20 MG/100ML
5-200 INJECTION INTRAVENOUS CONTINUOUS
Status: DISCONTINUED | OUTPATIENT
Start: 2024-07-27 | End: 2024-08-01 | Stop reason: HOSPADM

## 2024-07-27 RX ORDER — MAGNESIUM SULFATE IN WATER 40 MG/ML
2000 INJECTION, SOLUTION INTRAVENOUS ONCE
Status: COMPLETED | OUTPATIENT
Start: 2024-07-27 | End: 2024-07-27

## 2024-07-27 RX ORDER — INSULIN LISPRO 100 [IU]/ML
3 INJECTION, SOLUTION INTRAVENOUS; SUBCUTANEOUS
Status: DISCONTINUED | OUTPATIENT
Start: 2024-07-28 | End: 2024-08-01 | Stop reason: HOSPADM

## 2024-07-27 RX ORDER — INSULIN GLARGINE 100 [IU]/ML
10 INJECTION, SOLUTION SUBCUTANEOUS NIGHTLY
Status: DISCONTINUED | OUTPATIENT
Start: 2024-07-28 | End: 2024-08-01 | Stop reason: HOSPADM

## 2024-07-27 RX ORDER — ATORVASTATIN CALCIUM 40 MG/1
40 TABLET, FILM COATED ORAL DAILY
Status: DISCONTINUED | OUTPATIENT
Start: 2024-07-27 | End: 2024-08-01 | Stop reason: HOSPADM

## 2024-07-27 RX ORDER — CALCIUM GLUCONATE 20 MG/ML
2000 INJECTION, SOLUTION INTRAVENOUS ONCE
Status: COMPLETED | OUTPATIENT
Start: 2024-07-27 | End: 2024-07-27

## 2024-07-27 RX ORDER — DEXTROSE MONOHYDRATE 100 MG/ML
INJECTION, SOLUTION INTRAVENOUS CONTINUOUS PRN
Status: DISCONTINUED | OUTPATIENT
Start: 2024-07-27 | End: 2024-08-01 | Stop reason: HOSPADM

## 2024-07-27 RX ORDER — SODIUM CHLORIDE 9 MG/ML
INJECTION, SOLUTION INTRAVENOUS CONTINUOUS
Status: DISCONTINUED | OUTPATIENT
Start: 2024-07-27 | End: 2024-07-27

## 2024-07-27 RX ORDER — INSULIN LISPRO 100 [IU]/ML
0-4 INJECTION, SOLUTION INTRAVENOUS; SUBCUTANEOUS
Status: DISCONTINUED | OUTPATIENT
Start: 2024-07-27 | End: 2024-08-01 | Stop reason: HOSPADM

## 2024-07-27 RX ORDER — ASPIRIN 81 MG/1
81 TABLET ORAL DAILY
Status: DISCONTINUED | OUTPATIENT
Start: 2024-07-27 | End: 2024-08-01 | Stop reason: HOSPADM

## 2024-07-27 RX ORDER — ALBUTEROL SULFATE 90 UG/1
2 AEROSOL, METERED RESPIRATORY (INHALATION) EVERY 4 HOURS PRN
Status: DISCONTINUED | OUTPATIENT
Start: 2024-07-27 | End: 2024-08-01 | Stop reason: HOSPADM

## 2024-07-27 RX ORDER — INSULIN LISPRO 100 [IU]/ML
0-4 INJECTION, SOLUTION INTRAVENOUS; SUBCUTANEOUS NIGHTLY
Status: DISCONTINUED | OUTPATIENT
Start: 2024-07-27 | End: 2024-08-01 | Stop reason: HOSPADM

## 2024-07-27 RX ORDER — GLUCAGON 1 MG/ML
1 KIT INJECTION PRN
Status: DISCONTINUED | OUTPATIENT
Start: 2024-07-27 | End: 2024-08-01 | Stop reason: HOSPADM

## 2024-07-27 RX ADMIN — CALCIUM GLUCONATE 2000 MG: 20 INJECTION, SOLUTION INTRAVENOUS at 02:19

## 2024-07-27 RX ADMIN — INSULIN LISPRO 1 UNITS: 100 INJECTION, SOLUTION INTRAVENOUS; SUBCUTANEOUS at 17:16

## 2024-07-27 RX ADMIN — INSULIN LISPRO 1 UNITS: 100 INJECTION, SOLUTION INTRAVENOUS; SUBCUTANEOUS at 08:05

## 2024-07-27 RX ADMIN — TIOTROPIUM BROMIDE AND OLODATEROL 2 PUFF: 3.124; 2.736 SPRAY, METERED RESPIRATORY (INHALATION) at 08:38

## 2024-07-27 RX ADMIN — MAGNESIUM SULFATE HEPTAHYDRATE 2000 MG: 40 INJECTION, SOLUTION INTRAVENOUS at 03:09

## 2024-07-27 RX ADMIN — SODIUM CHLORIDE: 9 INJECTION, SOLUTION INTRAVENOUS at 03:07

## 2024-07-27 RX ADMIN — INSULIN LISPRO 1 UNITS: 100 INJECTION, SOLUTION INTRAVENOUS; SUBCUTANEOUS at 12:03

## 2024-07-27 RX ADMIN — ASPIRIN 81 MG: 81 TABLET, COATED ORAL at 08:04

## 2024-07-27 RX ADMIN — WATER 2000 MG: 1 INJECTION INTRAMUSCULAR; INTRAVENOUS; SUBCUTANEOUS at 05:35

## 2024-07-27 RX ADMIN — ATORVASTATIN CALCIUM 40 MG: 80 TABLET, FILM COATED ORAL at 08:05

## 2024-07-27 RX ADMIN — NITROGLYCERIN 5 MCG/MIN: 20 INJECTION INTRAVENOUS at 14:58

## 2024-07-27 RX ADMIN — CLOPIDOGREL BISULFATE 75 MG: 75 TABLET ORAL at 08:04

## 2024-07-27 RX ADMIN — BUMETANIDE 1 MG: 0.25 INJECTION INTRAMUSCULAR; INTRAVENOUS at 02:12

## 2024-07-27 ASSESSMENT — PAIN SCALES - GENERAL
PAINLEVEL_OUTOF10: 0
PAINLEVEL_OUTOF10: 7
PAINLEVEL_OUTOF10: 0

## 2024-07-27 ASSESSMENT — PAIN DESCRIPTION - ORIENTATION: ORIENTATION: LEFT

## 2024-07-27 ASSESSMENT — PAIN - FUNCTIONAL ASSESSMENT: PAIN_FUNCTIONAL_ASSESSMENT: PREVENTS OR INTERFERES SOME ACTIVE ACTIVITIES AND ADLS

## 2024-07-27 ASSESSMENT — PAIN DESCRIPTION - FREQUENCY: FREQUENCY: CONTINUOUS

## 2024-07-27 ASSESSMENT — PAIN DESCRIPTION - ONSET: ONSET: ON-GOING

## 2024-07-27 ASSESSMENT — PAIN DESCRIPTION - PAIN TYPE: TYPE: ACUTE PAIN

## 2024-07-27 ASSESSMENT — PAIN DESCRIPTION - LOCATION: LOCATION: GROIN;LEG

## 2024-07-27 ASSESSMENT — PAIN DESCRIPTION - DESCRIPTORS: DESCRIPTORS: ACHING

## 2024-07-27 NOTE — BRIEF OP NOTE
[facility]  Sedation/Analgesia Post Sedation Record    Pt Name: Jon Ji  Account number: [id number]  MRN: [id number]  YOB: 1950  Procedure Performed By: Gomez Navarrete MD MD FACC, GARRETT, GENESIS  Primary Care Physician: Cailin Contreras APRN - CNP  Date: 7/27/2024    POST-PROCEDURE    Physicians/Assistants: Gomez Navarrete MD MD FACC, GARRETT, GENESIS    Procedure Performed:Peripheral Angiogram/Intervention      Sedation/Anesthesia: Versed/ Fentanyl and 2% xylocaine local anesthesia.      Estimated Blood Loss: < 50 ml.     Specimens Removed: None         Disposition of Specimen: N/A        Complications: No Immediate Complications.       Post-procedure Diagnosis/Findings:       8 x 50 Viabhan across the L SFA with 5 separate inflations at 16 AURA for balloon tamponade  Peripheral angiogram demonstrated significant reflux of contrast concerning for possible compartment syndrome vs AI  We found a 30 mm P2P gradient across the valve and aortogram demonstrated no significant AI  Will monitor for compartment syndrome overnight         Gomez Navarrete MD MD FACC, GARRETT, GENESIS  Electronically signed 7/27/2024 at 12:55 AM  Interventional Cardiology

## 2024-07-27 NOTE — CONSULTS
[facility]'s  Consult    Patient's Name/Date of Birth: Jon Ji / 1950 (74 y.o.)    Date: July 27, 2024     Referring Provider: Ulisses Davis MD    CHIEF COMPLAINT:  Hemorrhagic Shock       HPI: This is a pleasant 74 y.o. male presents with hx of CABG x 5, COPD, DVT/PE who had S3U26 TAVR done at [facility]--main access site was RFA, and the pigtail site was 6Fr manual via the L SFA.  He was at home today, grilling, and had episodes of significant sneezing earlier when he noticed a bulge in the left groin.  It continued to get worse and the swelling started to cause pain, lightheadedness, and sob.  He became pale and diaphoretic.  He contacted his cardiologist, Dr. Amaro.  He called the EMS.  He asked EMS to take him to [facility], but EMS brought him to [facility] because he was sob.  Hgb 7 down from 13.  He was placed on Levophed.  After initial stabilization, we called [facility]-Dr. Amaro who insisted that we manage the patient at our hospital.  Patient had OM stent done 1 month prior.  He is not on OAC.  He is in 10/10 pain, wife at bedside.  He is responsive and awake.  He has no cardiac discomfort.  He notes bruising of the RFA as well.     ED attempted transfer but again [facility] felt that she was better served here, thus Femostop applied while attempting stabilization, but patient continued to decompensate and ultimately we elected to proceed with emergency intervention tonight.    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  Hemorrhagic enlargement of left medial thigh muscles.  No vascular occlusions or stenosis.  13 mm nonobstructing calculus in the lower pole of the right kidney.      All labs, EKG's, diagnostic testing and images as well as cardiac cath, stress testing were reviewed during this encounter    Past Medical History:   Diagnosis Date    Arthritis     Back, knees    CAD (coronary artery disease)     CHF  04/25/2022 12:00 AM    TRIG 96 04/25/2022 12:00 AM     TSH: No results found for: \"TSH\"  UA:   Lab Results   Component Value Date/Time    COLORU YELLOW 12/13/2022 06:10 AM    PHUR 6.0 12/13/2022 06:10 AM    LABCAST NONE SEEN 06/09/2022 10:39 AM    LABCAST NONE SEEN 06/09/2022 10:39 AM    WBCUA 15-25 12/13/2022 06:10 AM    RBCUA 0-2 12/13/2022 06:10 AM    YEAST NONE SEEN 12/13/2022 06:10 AM    BACTERIA MANY 12/13/2022 06:10 AM    LEUKOCYTESUR MODERATE 12/13/2022 06:10 AM    UROBILINOGEN 0.2 12/13/2022 06:10 AM    BILIRUBINUR NEGATIVE 12/13/2022 06:10 AM    BLOODU SMALL 12/13/2022 06:10 AM    GLUCOSEU NEGATIVE 12/13/2022 06:10 AM         Physical Exam:  Vitals:    07/27/24 0030   BP:    Pulse: 92   Resp: 26   Temp:    SpO2: 100%      Intake/Output Summary (Last 24 hours) at 7/27/2024 0033  Last data filed at 7/26/2024 2310  Gross per 24 hour   Intake 2730 ml   Output --   Net 2730 ml      General:  No acute distress  Neck: Supple, no JVD, no carotid bruits  Heart: Regular rhythm normal S1 and S2, no rubs, murmurs or gallop, sternotomy scar  Lungs: clear to ascultation no rales, wheezes, or rhonchi  Abdomen: positive bowel sounds, soft, non-tender, non-distended, no bruits, no masses  Extremities:no clubbing, cyanosis or edema  Neurologic: alert and oriented x 3, cranial nerves 2-12 grossly intact, motor and sensory intact, moving all extremities  Skin: No rashes, bilateral groin ecchymoses, with the large left hematoma approximately 10 cm in length, with tenderness to palpation of the entire thigh  Psych: AO x 3, no depression/tammie, no pressured speech, normal affect  Lymph: No obvious LAD      Assessment:  Hemorrhagic shock (HCC)  Recent TAVR - S3U26  Left sided hemorrhage - related to non-TAVR site  S/p OM stent - 1 month prior  CABG x 5  Hx of DVT/PE - s/p IVC filter  COPD      Plan:  NPO   STAT Angiogram now  Massive transfusion protocol - 4/4/1 protocol  IV bicarbonate drip  CBC q 2 hours  Lactate/ABG  IV

## 2024-07-27 NOTE — PROCEDURES
[address] [address]                         CARDIAC CATHETERIZATION      PATIENT NAME: BRADLEY BECERRA             : 1950  MED REC NO: [id number]                       ROOM: [facility]  ACCOUNT NO: [id number]                       ADMIT DATE: 2024  PROVIDER: Gomez Navarrete MD      DATE OF PROCEDURE: 2024    PROCEDURE:  Peripheral angiography/intervention.    INDICATION:  Hemorrhagic shock.    DESCRIPTION OF PROCEDURE:  The patient was brought to the special procedure suite, prepped and draped in sterile fashion.  He had a TAVR placed at outside hospital.  The patient's main TAVR stays in the right femoral artery.  This was hemorrhagic shock from left femoral artery, which was 6-Prydeinig and with the manual pull due to SFA access.  The patient was on high-dose pressors requiring massive transfusion protocol and therefore, we proceeded with emergency intervention.  After infiltration of the right inguinal region with 2% lidocaine using micropuncture and modified Seldinger technique under fluoroscopic guidance, I was able to insert a 5-Prydeinig sheath in the right femoral artery.  I then inserted a 5-Prydeinig VCF catheter using a 0.035 angled Glidewire Advantage into the abdominal aorta.  I crossed over the common iliac artery bifurcation and performed digital substraction angiography in this position.    Popliteal artery:  Patent.    Common femoral artery:  Patent.    SFA:  There was SFA contrast extravasation suggestive of active hemorrhage with pseudoaneurysm formation and continued contrast leak into the thigh suggestive of active hemorrhage.  I immediately crossed over with an 8-Prydeinig AL1 sheath, exchanged out for an 8 x 40 [place] balloon to inflate the balloon over the hemorrhagic site to provide a balloon tamponade.  This was an extremely large bleed and plan was to proceed with covered stenting to prevent continued blood  loss and shock.  A quantitative angiography demonstrated that the vessel was approximately 7 to 7.5 mm in diameter.  I elected to proceed with the 8 x 50 Viabahn self-expanding prosthesis.  This was placed over the active hemorrhagic site using roadmap guidance.  This was deployed without complication.  I used an 8 x 40 balloon and postdilated with a stent approximately 4 to 5 times at 16 atmospheres up to 5-minute inflations distally in the mid section of the stent and proximally to avoid any endoleak.  At the end of the procedure, in BOYLE and Greek projections, there was no residual bleeding noted.  There was at least 2-vessel runoff to the foot.  Given these findings, no further intervention was taken.  All equipments removed from the patient.  General abdominal aortogram demonstrated no significant damage to any of the iliac arteries or the aorta.  Right femoral artery puncture was confirmed and I used an 8-Irish Angio-Seal for hemostasis.  The patient tolerated the procedure well.    Aortic valve:  After review of the angiographic imaging, there was significant to and fro pattern on the angiogram in the abdominal aorta of both iliac and left lower extremity.  There was concern for significant aortic insufficiency related to TAVR prosthesis.  I did take a 6-Irish pigtail catheter.  I crossed the valve and did find a 30 mm P2P gradient across the valve.  Otherwise, there was no significant elevation in diastolic pressure.  General aortogram demonstrated well-positioned TAVR prosthesis without any significantly visualized AI.  The peripheral angiogram likely was related to his hyperdynamic LV and the pressor use due to distal vasoconstriction.    IMMEDIATE COMPLICATIONS:  None.    MEDICATIONS:  See EMR.    ACCESS:  See above.    ESTIMATED BLOOD LOSS:  Less than 50 mL related to the procedure, however, the patient was actively hemorrhaging at that time.    SUMMARY:  Successful left SFA 8 x 50 Viabahn self-expanding

## 2024-07-27 NOTE — H&P
CRITICAL CARE H&P NOTE      Patient:  Jon ZAVALA [place]    Unit/Bed:4D-05/005-A  YOB: 1950  MRN: [id number]   PCP: Cailin Contreras APRN - CNP  Date of Admission: 7/26/2024  Chief Complaint:-Thigh hematoma    Assessment and Plan:    Hemorrhagic shock: 2/2 TAVR 6F pigtail site (L SFA) hemorrhage.  Significant extravasation into left thigh.  Will monitor for signs and symptoms of compartment syndrome. Monitor LE pulses. Serial thigh measurements.  Baseline hemoglobin 13.  Hemoglobin on arrival 8.7, repeat was 7.0.  Received 2 units PRBC in ED.  Was up to 70 of Levophed and 0.3 of vasopressin.  With significant tachypnea and tachycardia.  I ordered massive transfusion protocol.  Quickly weaned off of vasopressor support with blood transfusion.  Received 6 units of PRBC, 4 units of plasma, 1 unit platelets.  I discussed the case with Dr. Navarrete, who had been notified by ED.  Emergently taken to interventional suite for percutaneous intervention.  S/p L SFA stent.  Resume antiplatelets in the a.m. monitor hgb and vitals. 1x dose bumex due to significant volume infused.  Monitor kidney function and urine output given hypotension and significant amount of contrast. Will monitor for the need for surgical consult and compartment pressure testing.   S/p TAVR: check TTE. 1x dose ancef.   HAGMA: Now resolved.  2/2 lactic acidosis from hemorrhagic shock as above.  Lactic peaked at 11.2.  pH was 7.15, bicarb on BMP was 9.  Was started on bicarb gtt.  Repeat ABG largely improved.  Gap is closed.  DC bicarb gtt. and start NS.   Acute anemia: 2/2 hemorrhage as above.  Acute Thrombocytopenia: suspect mainly dilutional given significant volume infused in the setting of hemorrage. Received 1 unit platelets as part of MTP protocol;however did receive 2 units PRBC prior to protocol. Continue to monitor.    CAD:  hx CABG and recent PCI. no indication of ACS. Continue DAPT. Statin.   NIDDM 2: Most recent A1c 6.2 in 2022. Home

## 2024-07-27 NOTE — PROGRESS NOTES
Patient arrived to unit from IR via bed. Patient transferred to ICU bed and placed on continuous ICU bedside monitor. Patient admitted for Hemorrhagic shock (HCC) [R57.8]  Pseudoaneurysm following procedure (HCC) [T81.718A, I72.9]  Hematoma of left thigh, initial encounter [S70.12XA]. Vitals obtained. See flowsheets. Patient's IV access includes Right IJ, 20G Left forearm and a8G Right AC. Current infusions and rates of infusion include Sodium Bicarb @ 200ml/hr and platelets @ 100ml/hr. Assessment completed by Rola DAS. Two nurse skin assessment completed by Rola DAS and Nannette DAS. See flowsheets for assessment details. Policies and procedures of ICU able to be explained to patient at this time. Family member(s)/representative(s) present at time of admission include yes. Patient rights explained to family member(s)/representatives and patient, as able. Patient/patient's family member(s)/representative(s) N/A to have physician notified of their admission. All questions posed by patient's family member(s)/representative(s) and patient answered at this time.

## 2024-07-27 NOTE — ED NOTES
Blood going through mass transfuser and fluid warmer at this time, warm blankets applied at this time.

## 2024-07-27 NOTE — ED NOTES
ED to inpatient nurses report      Chief Complaint:  Chief Complaint   Patient presents with    Shortness of Breath     Present to ED from: home    MOA:     LOC: alert and orientated to name, place, date  Mobility: Requires assistance * 2  Oxygen Baseline: ra    Current needs required: 5L NC     Code Status:   Prior    What abnormal results were found and what did you give/do to treat them? See Chart  Any procedures or intervention occur? See MAR    Mental Status:  Level of Consciousness: Alert (0)    Psych Assessment:        Vitals:  Patient Vitals for the past 24 hrs:   BP Temp Temp src Pulse Resp SpO2   07/26/24 2133 (!) 69/39 -- -- (!) 117 (!) 35 97 %   07/26/24 2121 (!) 105/53 -- -- (!) 103 22 100 %   07/26/24 2110 93/68 -- -- (!) 113 25 100 %   07/26/24 2100 (!) 103/50 -- -- (!) 105 (!) 34 100 %   07/26/24 2055 (!) 101/56 96.9 °F (36.1 °C) Axillary (!) 111 28 100 %   07/26/24 2050 104/60 -- -- (!) 104 (!) 31 99 %   07/26/24 2043 102/60 98 °F (36.7 °C) -- 96 26 100 %   07/26/24 2031 90/63 98 °F (36.7 °C) Oral 99 18 100 %   07/26/24 2019 (!) 102/49 -- -- -- -- --   07/26/24 2012 (!) 97/56 -- -- (!) 106 (!) 31 100 %   07/26/24 2005 (!) 88/49 -- -- 89 29 100 %   07/26/24 1939 (!) 101/53 -- -- -- -- --   07/26/24 1937 (!) 98/51 -- -- -- -- --   07/26/24 1935 (!) 100/52 97.9 °F (36.6 °C) Oral 85 28 100 %   07/26/24 1921 (!) 108/52 -- -- -- -- --   07/26/24 1918 (!) 87/51 -- -- -- -- --   07/26/24 1916 (!) 78/41 -- -- -- -- 100 %   07/26/24 1835 (!) 106/56 -- -- 98 28 100 %   07/26/24 1758 -- 97.7 °F (36.5 °C) Oral -- -- --   07/26/24 1755 (!) 101/58 -- -- 95 25 99 %   07/26/24 1754 -- -- -- -- -- 99 %   07/26/24 1748 94/65 -- -- 97 18 100 %        LDAs:   Peripheral IV 07/26/24 Left;Ventral Forearm (Active)   Site Assessment Clean, dry & intact 07/26/24 1935   Line Status Infusing 07/26/24 1935   Phlebitis Assessment No symptoms 07/26/24 1935   Infiltration Assessment 0 07/26/24 1935   Dressing Status Clean, dry &

## 2024-07-27 NOTE — H&P
daily    Elton Nicole MD   Multiple Vitamins-Minerals (MULTIVITAMIN PO) Take  by mouth daily.    Elton Nicole MD   Omega-3 Fatty Acids (FISH OIL PO) Take  by mouth daily.    Elton Nicole MD   Glucosamine-Chondroitin (GLUCOSAMINE CHONDR COMPLEX PO) Take 1 tablet by mouth 2 times daily     Elton Nicole MD     Additional information:       VITAL SIGNS   Vitals:    07/27/24 0030   BP:    Pulse: 92   Resp: 26   Temp:    SpO2: 100%       PHYSICAL:   General: No acute distress  HEENT:  Unremarkable for age  Neck: without increased JVD, carotid pulses 2+ bilaterally without bruits  Heart: RRR, S1 & S2 WNL, S4 gallop, without murmurs or rubs   NYHA: 2  Lungs: Clear to auscultation    Abdomen: BS present, without HSM, masses, or tenderness    Extremities: without C,C,E.  Pulses 2+ bilaterally  Mental Status: Alert & Oriented        PLANNED PROCEDURE   []Cath  []PCI                []Pacemaker/AICD  []CAMILLA             []Cardioversion [x]Peripheral angiography/PTA  []Other:      SEDATION  Planned agent:[x]Midazolam []Meperidine [x]Sublimaze []Morphine  []Diazepam  []Other:     ASA Classification:  []1 []2 [x]3 []4 []5  Class 1: A normal healthy patient  Class 2: Pt with mild to moderate systemic disease  Class 3: Severe systemic disease or disturbance  Class 4: Severe systemic disorders that are already life threatening.  Class 5: Moribund pt with little chances of survival, for more than 24 hours.  Mallampati I Airway Classification:   []1 []2 [x]3 []4    [x]Pre-procedure diagnostic studies complete and results available.   Comment:    [x]Previous sedation/anesthesia experiences assessed.   Comment:    [x]The patient is an appropriate candidate to undergo the planned procedure sedation and anesthesia. (Refer to nursing sedation/analgesia documentation record)  [x]Formulation and discussion of sedation/procedure plan, risks, and expectations with patient and/or responsible adult  completed.  [x]Patient examined immediately prior to the procedure. (Refer to nursing sedation/analgesia documentation record)    Gomez Navarrete MD MD   Electronically signed 7/27/2024 at 12:33 AM

## 2024-07-27 NOTE — PROGRESS NOTES
2300 Patient received in IR for arteriogram procedure with family taken to ICU waiting area. This procedure has been fully reviewed with the patient and his wife in the ER emergently at the bedside by Dr Navarrete and consent has been obtained. Manual pressure being held to left groin site. Multiple ER and ICU staff with the pt to manage pressors and mass transfusion blood products.   2310 Patient prepped for procedure.  2315 Procedure started with Dr. Navarrete.  2318 Access to right femoral artery with use of sonosite.   2338 Deployed an 8 x 50 Viabahn stent to the left SFA.   2339 post dilated stent with an 0.35 [place] 8 x 40 balloon.   2342 Balloon inflation with an 0.35 [place] 8 x 40 to control active bleeding.  2347 Balloon inflation with an 0.35 [place] 8 x 40 to control active bleeding.   2358 Balloon inflation with an 0.35 [place] 8 x 40 to control active bleeding.  0030 Procedure completed; patient tolerated well. 8fr Angio seal device deployed to right femoral artery.   0035 Bacitracin oint, gauze and op site to right femoral site; area soft to touch with no bleeding noted.   0040 Patient on bed; comfort ensured. Right femoral dressing remains dry and intact with area soft.   0045 Patient taken to ICU via bed with multiple ICU staff at bedside. Right femoral dressing remains dry and intact with area soft. Pt alert and oriented x3; follows commands. Skin pink, warm, and dry. Respirations easy, regular, and nonlabored.

## 2024-07-27 NOTE — PLAN OF CARE
Problem: Respiratory - Adult  Goal: Lung sounds clear or within normal limits for patient  Note: Patient receiving inhaler to maintain and manage respiratory status.

## 2024-07-27 NOTE — ED NOTES
Dr. Arcos holding pressure on pt's femoral at this time. This RN used doppler and had present strong pulses on pts lower extremity.

## 2024-07-27 NOTE — PLAN OF CARE
Problem: Discharge Planning  Goal: Discharge to home or other facility with appropriate resources  7/27/2024 0859 by Paola Avealr RN  Outcome: Progressing  7/27/2024 0447 by Rola Scott RN  Outcome: Progressing     Problem: Pain  Goal: Verbalizes/displays adequate comfort level or baseline comfort level  7/27/2024 0859 by Paola Avelar RN  Outcome: Progressing  7/27/2024 0447 by Rola Scott RN  Outcome: Progressing     Problem: Safety - Adult  Goal: Free from fall injury  7/27/2024 0859 by Paola Avelar RN  Outcome: Progressing  7/27/2024 0447 by Rola Scott RN  Outcome: Progressing

## 2024-07-27 NOTE — PLAN OF CARE
Patient was taken back to the Cath Lab today and her bleeding was able to be controlled.  Her hemoglobin has slowly trended down since the procedure but her she has been on IVF.  We will continue to monitor CBC every 4 hours.  In total she has received 4 units PRBCs.  Patient is off pressors.  His aspirin and Plavix were resumed today.  Patient also has thrombocytopenia, will continue to monitor.    Electronically signed by Tra Chamberlain MD on 7/27/2024 at 3:17 PM

## 2024-07-27 NOTE — ED PROVIDER NOTES
Patient Name: Jon Ji   Medical Record Number: [id number]  Date: 7/26/2024   Time: 10:52 PM   [facility]  Arterial Line Placement Procedure Note                   Indication: cardiovascular instability and shock    Consent: Unable to be obtained due to the emergent nature of this procedure.    Popeye's Test: Normal    Procedure: The skin over the right radial artery was prepped with chlorhexidine.  Local anesthesia was obtained by infiltration using 1% Lidocaine without epinephrine.  An 18 gauge angiocath was then inserted, over a needle, into the vessel.  The transducer set was then attached and securely fastened to the skin with sutures.  Waveforms on the monitor were observed and found to be adequate.  The patient had good distal perfusion after the procedure. The site was then dressed in a sterile fashion.    The patient tolerated the procedure well.     Complications: None    Electronically Signed by: DO Blair Estrella Marcello, DO  07/26/24 225       Ethan Pinto DO  07/26/24 2254    Patient Name: Jon Ji   Medical Record Number: [id number]  Date: 7/26/2024   Time: 10:54 PM   Room/Bed: 04[facility]  Central Line Placement Procedure Note  Indication: vascular access, hypovolemia, central venous monitoring, severe bleeding, and need for frequent blood draws    Consent: Unable to be obtained due to the emergent nature of this procedure.    Procedure: The patient was positioned appropriately and the skin over the right internal jugular vein was prepped with chlorhexidine. Local anesthesia was obtained by infiltration using 1% Lidocaine without epinephrine.  A large bore needle was used to identify the vein.  A guide wire was then inserted into the vein through the needle. A triple lumen catheter was then inserted into the vessel over the guide wire using the Seldinger technique.  All ports showed good, free flowing blood return and were flushed with saline solution.

## 2024-07-27 NOTE — CONSENT
Informed Consent for Blood Component Transfusion Note    I have discussed with the patient the rationale for blood component transfusion; its benefits in treating or preventing fatigue, organ damage, or death; and its risk which includes mild transfusion reactions, rare risk of blood borne infection, or more serious but rare reactions. I have discussed the alternatives to transfusion, including the risk and consequences of not receiving transfusion. The patient had an opportunity to ask questions and had agreed to proceed with transfusion of blood components.    Electronically signed by Miky Perkins DO on 7/26/24 at 11:35 PM EDT

## 2024-07-28 PROBLEM — I72.9 PSEUDOANEURYSM FOLLOWING PROCEDURE (HCC): Status: ACTIVE | Noted: 2024-07-28

## 2024-07-28 PROBLEM — T81.718A PSEUDOANEURYSM FOLLOWING PROCEDURE (HCC): Status: ACTIVE | Noted: 2024-07-28

## 2024-07-28 LAB
ANION GAP SERPL CALC-SCNC: 10 MEQ/L (ref 8–16)
APTT PPP: 25.4 SECONDS (ref 22–38)
BASOPHILS ABSOLUTE: 0 THOU/MM3 (ref 0–0.1)
BASOPHILS ABSOLUTE: 0.1 THOU/MM3 (ref 0–0.1)
BASOPHILS NFR BLD AUTO: 0.2 %
BASOPHILS NFR BLD AUTO: 0.2 %
BASOPHILS NFR BLD AUTO: 0.3 %
BASOPHILS NFR BLD AUTO: 0.5 %
BUN SERPL-MCNC: 17 MG/DL (ref 7–22)
CALCIUM SERPL-MCNC: 8.1 MG/DL (ref 8.5–10.5)
CHLORIDE SERPL-SCNC: 99 MEQ/L (ref 98–111)
CO2 SERPL-SCNC: 24 MEQ/L (ref 23–33)
CREAT SERPL-MCNC: 0.9 MG/DL (ref 0.4–1.2)
DEPRECATED RDW RBC AUTO: 46.1 FL (ref 35–45)
DEPRECATED RDW RBC AUTO: 46.3 FL (ref 35–45)
DEPRECATED RDW RBC AUTO: 46.5 FL (ref 35–45)
DEPRECATED RDW RBC AUTO: 46.6 FL (ref 35–45)
EOSINOPHIL NFR BLD AUTO: 0.3 %
EOSINOPHIL NFR BLD AUTO: 0.5 %
EOSINOPHIL NFR BLD AUTO: 0.7 %
EOSINOPHIL NFR BLD AUTO: 0.8 %
EOSINOPHILS ABSOLUTE: 0 THOU/MM3 (ref 0–0.4)
EOSINOPHILS ABSOLUTE: 0.1 THOU/MM3 (ref 0–0.4)
ERYTHROCYTE [DISTWIDTH] IN BLOOD BY AUTOMATED COUNT: 14.1 % (ref 11.5–14.5)
ERYTHROCYTE [DISTWIDTH] IN BLOOD BY AUTOMATED COUNT: 14.2 % (ref 11.5–14.5)
FIBRINOGEN PPP-MCNC: 373 MG/100ML (ref 155–475)
GFR SERPL CREATININE-BSD FRML MDRD: 89 ML/MIN/1.73M2
GLUCOSE BLD STRIP.AUTO-MCNC: 215 MG/DL (ref 70–108)
GLUCOSE BLD STRIP.AUTO-MCNC: 229 MG/DL (ref 70–108)
GLUCOSE BLD STRIP.AUTO-MCNC: 234 MG/DL (ref 70–108)
GLUCOSE BLD STRIP.AUTO-MCNC: 235 MG/DL (ref 70–108)
GLUCOSE SERPL-MCNC: 186 MG/DL (ref 70–108)
HCT VFR BLD AUTO: 23.2 % (ref 42–52)
HCT VFR BLD AUTO: 23.6 % (ref 42–52)
HCT VFR BLD AUTO: 24.4 % (ref 42–52)
HCT VFR BLD AUTO: 24.7 % (ref 42–52)
HCT VFR BLD AUTO: 25 % (ref 42–52)
HCT VFR BLD AUTO: 25.9 % (ref 42–52)
HGB BLD-MCNC: 7.9 GM/DL (ref 14–18)
HGB BLD-MCNC: 8.1 GM/DL (ref 14–18)
HGB BLD-MCNC: 8.3 GM/DL (ref 14–18)
HGB BLD-MCNC: 8.3 GM/DL (ref 14–18)
HGB BLD-MCNC: 8.4 GM/DL (ref 14–18)
HGB BLD-MCNC: 9 GM/DL (ref 14–18)
HGB RETIC QN AUTO: 30.9 PG (ref 28.2–35.7)
IMM GRANULOCYTES # BLD AUTO: 0.07 THOU/MM3 (ref 0–0.07)
IMM GRANULOCYTES # BLD AUTO: 0.1 THOU/MM3 (ref 0–0.07)
IMM GRANULOCYTES NFR BLD AUTO: 0.8 %
IMM GRANULOCYTES NFR BLD AUTO: 0.8 %
IMM GRANULOCYTES NFR BLD AUTO: 0.9 %
IMM GRANULOCYTES NFR BLD AUTO: 0.9 %
IMM RETICS NFR: 36.8 % (ref 2.3–13.4)
INR PPP: 1.14 (ref 0.85–1.13)
LYMPHOCYTES ABSOLUTE: 0.7 THOU/MM3 (ref 1–4.8)
LYMPHOCYTES ABSOLUTE: 1 THOU/MM3 (ref 1–4.8)
LYMPHOCYTES ABSOLUTE: 1 THOU/MM3 (ref 1–4.8)
LYMPHOCYTES ABSOLUTE: 1.4 THOU/MM3 (ref 1–4.8)
LYMPHOCYTES NFR BLD AUTO: 10.7 %
LYMPHOCYTES NFR BLD AUTO: 11.7 %
LYMPHOCYTES NFR BLD AUTO: 12 %
LYMPHOCYTES NFR BLD AUTO: 8.8 %
MCH RBC QN AUTO: 30.9 PG (ref 26–33)
MCH RBC QN AUTO: 31.2 PG (ref 26–33)
MCH RBC QN AUTO: 31.3 PG (ref 26–33)
MCH RBC QN AUTO: 31.7 PG (ref 26–33)
MCHC RBC AUTO-ENTMCNC: 33.6 GM/DL (ref 32.2–35.5)
MCHC RBC AUTO-ENTMCNC: 34 GM/DL (ref 32.2–35.5)
MCHC RBC AUTO-ENTMCNC: 34.1 GM/DL (ref 32.2–35.5)
MCHC RBC AUTO-ENTMCNC: 34.7 GM/DL (ref 32.2–35.5)
MCV RBC AUTO: 91.2 FL (ref 80–94)
MCV RBC AUTO: 91.7 FL (ref 80–94)
MCV RBC AUTO: 91.9 FL (ref 80–94)
MCV RBC AUTO: 92.1 FL (ref 80–94)
MONOCYTES ABSOLUTE: 0.7 THOU/MM3 (ref 0.4–1.3)
MONOCYTES ABSOLUTE: 0.8 THOU/MM3 (ref 0.4–1.3)
MONOCYTES ABSOLUTE: 0.9 THOU/MM3 (ref 0.4–1.3)
MONOCYTES ABSOLUTE: 1.1 THOU/MM3 (ref 0.4–1.3)
MONOCYTES NFR BLD AUTO: 9 %
MONOCYTES NFR BLD AUTO: 9.1 %
MONOCYTES NFR BLD AUTO: 9.4 %
MONOCYTES NFR BLD AUTO: 9.8 %
NEUTROPHILS ABSOLUTE: 6.3 THOU/MM3 (ref 1.8–7.7)
NEUTROPHILS ABSOLUTE: 6.9 THOU/MM3 (ref 1.8–7.7)
NEUTROPHILS ABSOLUTE: 7 THOU/MM3 (ref 1.8–7.7)
NEUTROPHILS ABSOLUTE: 8.8 THOU/MM3 (ref 1.8–7.7)
NEUTROPHILS NFR BLD AUTO: 76.7 %
NEUTROPHILS NFR BLD AUTO: 77.5 %
NEUTROPHILS NFR BLD AUTO: 78.2 %
NEUTROPHILS NFR BLD AUTO: 80.2 %
NRBC BLD AUTO-RTO: 0 /100 WBC
PLATELET # BLD AUTO: 52 THOU/MM3 (ref 130–400)
PLATELET # BLD AUTO: 56 THOU/MM3 (ref 130–400)
PLATELET # BLD AUTO: 56 THOU/MM3 (ref 130–400)
PLATELET # BLD AUTO: 72 THOU/MM3 (ref 130–400)
PMV BLD AUTO: 10.8 FL (ref 9.4–12.4)
PMV BLD AUTO: 11.1 FL (ref 9.4–12.4)
PMV BLD AUTO: 11.2 FL (ref 9.4–12.4)
PMV BLD AUTO: 11.3 FL (ref 9.4–12.4)
POTASSIUM SERPL-SCNC: 4.3 MEQ/L (ref 3.5–5.2)
RBC # BLD AUTO: 2.52 MILL/MM3 (ref 4.7–6.1)
RBC # BLD AUTO: 2.66 MILL/MM3 (ref 4.7–6.1)
RBC # BLD AUTO: 2.72 MILL/MM3 (ref 4.7–6.1)
RBC # BLD AUTO: 2.84 MILL/MM3 (ref 4.7–6.1)
RETICS # AUTO: 131 THOU/MM3 (ref 20–115)
RETICS/RBC NFR AUTO: 5.2 % (ref 0.5–2)
SODIUM SERPL-SCNC: 133 MEQ/L (ref 135–145)
WBC # BLD AUTO: 11.5 THOU/MM3 (ref 4.8–10.8)
WBC # BLD AUTO: 7.9 THOU/MM3 (ref 4.8–10.8)
WBC # BLD AUTO: 8.9 THOU/MM3 (ref 4.8–10.8)
WBC # BLD AUTO: 8.9 THOU/MM3 (ref 4.8–10.8)

## 2024-07-28 PROCEDURE — 85046 RETICYTE/HGB CONCENTRATE: CPT

## 2024-07-28 PROCEDURE — 85730 THROMBOPLASTIN TIME PARTIAL: CPT

## 2024-07-28 PROCEDURE — 85610 PROTHROMBIN TIME: CPT

## 2024-07-28 PROCEDURE — 94761 N-INVAS EAR/PLS OXIMETRY MLT: CPT

## 2024-07-28 PROCEDURE — 6370000000 HC RX 637 (ALT 250 FOR IP)

## 2024-07-28 PROCEDURE — 85384 FIBRINOGEN ACTIVITY: CPT

## 2024-07-28 PROCEDURE — 94640 AIRWAY INHALATION TREATMENT: CPT

## 2024-07-28 PROCEDURE — 2060000000 HC ICU INTERMEDIATE R&B

## 2024-07-28 PROCEDURE — 82948 REAGENT STRIP/BLOOD GLUCOSE: CPT

## 2024-07-28 PROCEDURE — 85025 COMPLETE CBC W/AUTO DIFF WBC: CPT

## 2024-07-28 PROCEDURE — 85018 HEMOGLOBIN: CPT

## 2024-07-28 PROCEDURE — 36592 COLLECT BLOOD FROM PICC: CPT

## 2024-07-28 PROCEDURE — 85014 HEMATOCRIT: CPT

## 2024-07-28 PROCEDURE — 36415 COLL VENOUS BLD VENIPUNCTURE: CPT

## 2024-07-28 RX ORDER — ACETAMINOPHEN 325 MG/1
650 TABLET ORAL EVERY 4 HOURS PRN
Status: DISCONTINUED | OUTPATIENT
Start: 2024-07-28 | End: 2024-08-01 | Stop reason: HOSPADM

## 2024-07-28 RX ORDER — HYDROCODONE BITARTRATE AND ACETAMINOPHEN 5; 325 MG/1; MG/1
2 TABLET ORAL EVERY 4 HOURS PRN
Status: DISCONTINUED | OUTPATIENT
Start: 2024-07-28 | End: 2024-08-01 | Stop reason: HOSPADM

## 2024-07-28 RX ORDER — HYDROCODONE BITARTRATE AND ACETAMINOPHEN 5; 325 MG/1; MG/1
1 TABLET ORAL EVERY 4 HOURS PRN
Status: DISCONTINUED | OUTPATIENT
Start: 2024-07-28 | End: 2024-08-01 | Stop reason: HOSPADM

## 2024-07-28 RX ADMIN — INSULIN LISPRO 1 UNITS: 100 INJECTION, SOLUTION INTRAVENOUS; SUBCUTANEOUS at 11:42

## 2024-07-28 RX ADMIN — INSULIN LISPRO 1 UNITS: 100 INJECTION, SOLUTION INTRAVENOUS; SUBCUTANEOUS at 17:32

## 2024-07-28 RX ADMIN — INSULIN GLARGINE 10 UNITS: 100 INJECTION, SOLUTION SUBCUTANEOUS at 21:54

## 2024-07-28 RX ADMIN — ASPIRIN 81 MG: 81 TABLET, COATED ORAL at 07:45

## 2024-07-28 RX ADMIN — INSULIN LISPRO 3 UNITS: 100 INJECTION, SOLUTION INTRAVENOUS; SUBCUTANEOUS at 11:42

## 2024-07-28 RX ADMIN — INSULIN LISPRO 3 UNITS: 100 INJECTION, SOLUTION INTRAVENOUS; SUBCUTANEOUS at 07:59

## 2024-07-28 RX ADMIN — INSULIN LISPRO 3 UNITS: 100 INJECTION, SOLUTION INTRAVENOUS; SUBCUTANEOUS at 17:32

## 2024-07-28 RX ADMIN — HYDROCODONE BITARTRATE AND ACETAMINOPHEN 1 TABLET: 5; 325 TABLET ORAL at 22:02

## 2024-07-28 RX ADMIN — ATORVASTATIN CALCIUM 40 MG: 80 TABLET, FILM COATED ORAL at 07:46

## 2024-07-28 RX ADMIN — CLOPIDOGREL BISULFATE 75 MG: 75 TABLET ORAL at 07:45

## 2024-07-28 RX ADMIN — INSULIN LISPRO 1 UNITS: 100 INJECTION, SOLUTION INTRAVENOUS; SUBCUTANEOUS at 07:59

## 2024-07-28 RX ADMIN — TIOTROPIUM BROMIDE AND OLODATEROL 2 PUFF: 3.124; 2.736 SPRAY, METERED RESPIRATORY (INHALATION) at 08:14

## 2024-07-28 ASSESSMENT — PAIN SCALES - GENERAL
PAINLEVEL_OUTOF10: 0
PAINLEVEL_OUTOF10: 5

## 2024-07-28 ASSESSMENT — PAIN DESCRIPTION - ONSET: ONSET: ON-GOING

## 2024-07-28 ASSESSMENT — PAIN DESCRIPTION - ORIENTATION: ORIENTATION: LEFT

## 2024-07-28 ASSESSMENT — PAIN DESCRIPTION - PAIN TYPE: TYPE: ACUTE PAIN

## 2024-07-28 ASSESSMENT — PAIN DESCRIPTION - FREQUENCY: FREQUENCY: CONTINUOUS

## 2024-07-28 ASSESSMENT — PAIN - FUNCTIONAL ASSESSMENT: PAIN_FUNCTIONAL_ASSESSMENT: PREVENTS OR INTERFERES SOME ACTIVE ACTIVITIES AND ADLS

## 2024-07-28 ASSESSMENT — PAIN DESCRIPTION - DESCRIPTORS: DESCRIPTORS: ACHING

## 2024-07-28 ASSESSMENT — PAIN DESCRIPTION - DIRECTION: RADIATING_TOWARDS: UP AND DOWN LEG

## 2024-07-28 ASSESSMENT — PAIN DESCRIPTION - LOCATION: LOCATION: GROIN;LEG

## 2024-07-28 NOTE — PLAN OF CARE
Problem: Discharge Planning  Goal: Discharge to home or other facility with appropriate resources  7/28/2024 0847 by Paola Avelar RN  Outcome: Progressing  7/27/2024 2301 by Staci Schmid RN  Outcome: Progressing  Flowsheets (Taken 7/27/2024 2301)  Discharge to home or other facility with appropriate resources:   Identify barriers to discharge with patient and caregiver   Identify discharge learning needs (meds, wound care, etc)   Refer to discharge planning if patient needs post-hospital services based on physician order or complex needs related to functional status, cognitive ability or social support system     Problem: Pain  Goal: Verbalizes/displays adequate comfort level or baseline comfort level  7/28/2024 0847 by Paola Avelar RN  Outcome: Progressing  7/27/2024 2301 by Staci Schmid RN  Outcome: Progressing  Flowsheets (Taken 7/27/2024 2301)  Verbalizes/displays adequate comfort level or baseline comfort level:   Encourage patient to monitor pain and request assistance   Assess pain using appropriate pain scale     Problem: Safety - Adult  Goal: Free from fall injury  7/28/2024 0847 by Paola Avelar RN  Outcome: Progressing  7/27/2024 2301 by Staci Schmid RN  Outcome: Progressing  Flowsheets (Taken 7/27/2024 2301)  Free From Fall Injury:   Instruct family/caregiver on patient safety   Based on caregiver fall risk screen, instruct family/caregiver to ask for assistance with transferring infant if caregiver noted to have fall risk factors     Problem: Cardiovascular - Adult  Goal: Maintains optimal cardiac output and hemodynamic stability  7/28/2024 0847 by Paola Avelar RN  Outcome: Progressing  7/27/2024 2302 by Staci Schmid RN  Flowsheets (Taken 7/27/2024 2302)  Maintains optimal cardiac output and hemodynamic stability:   Monitor blood pressure and heart rate   Assess for signs of decreased cardiac output     Problem: Hematologic - Adult  Goal:

## 2024-07-28 NOTE — H&P
CRITICAL CARE H&P NOTE      Patient:  Jon ZAVALA [place]    Unit/Bed:4D-05/005-A  YOB: 1950  MRN: [id number]   PCP: Cailin Contreras APRN - CNP  Date of Admission: 7/26/2024  Chief Complaint:-Thigh hematoma    Assessment and Plan:    Hemorrhagic shock: 2/2 TAVR 6F pigtail site (L SFA) hemorrhage.  Significant extravasation into left thigh.  Baseline hemoglobin 13.  Hemoglobin on arrival 8.7, repeat was 7.0.  S/p 6 units of PRBC, 4 units of plasma, 1 unit platelets.  Case discussed with Dr. Navarrete, who had been notified by ED.  Emergently taken to interventional suite for percutaneous intervention.  S/p L SFA stent.  Monitor kidney function and urine output given hypotension and significant amount of contrast.    Monitor for signs of compartment syndrome  Monitor pulses  Serial thigh measurements  Will get CBC every 6 hours for 1 day  Continue DAPT  Acute anemia: 2/2 hemorrhage.   Acute Thrombocytopenia: Initially suspect 2/2 dilutional.  Slowly continuing to downtrend.  Will continue to monitor.  Patient currently taking ASA and Plavix.  CAD:  hx CABG and recent PCI. no indication of ACS.   DAPT  Statin  NIDDM 2: Last Hb a1c 5.3 7/27/2024. Home med includes metformin 1000 mg twice daily.    Hold oral antihyperglycemics  Low-dose sliding scale  POCT x 4  Hypoglycemia protocol  HTN: Home meds include amlodipine 5 mg daily, losartan 50 mg daily. Hold until BP proves stable.   COPD: Not in acute exacerbation. Continue LABA/LAMA. PRN albuterol  HLD: Continue statin  Nonobstructing calculus in lower pole of right kidney.  HAGMA: resolved    INITIAL H AND P AND ICU COURSE:  This is a very pleasant 74-year-old male with PMH DVT/PE COPD, CAD s/p CABG and PCI, AI s/p TAVR, DM 2, HTN, HLD who presented to [facility] for CC of left groin pain, shortness of breath, and lightheadedness.  He had a recent TAVR performed at [facility] previously in the week.  Also with an OM stent done 1 month ago.    He states that he was at home

## 2024-07-28 NOTE — PLAN OF CARE
I have accepted this patient as a function of my admitting duties as a transfer from ICU to stepdown unit      Patient required emergent left SFA self-expanding prosthesis implantation secondary to SFA hemorrhage related to a TAVR procedure performed at an outside hospital the self-expanding prosthesis was successfully placed 7/27/2024    The patient did require blood products shortly after admission since the patient appears to have had hemorrhagic shock with associated hypotension and hypothermia he required approximately 6 units of PRBC 4 units of plasma and approximate 1 unit of platelets    This is day one of my evaluation but it appears the patient reports subjective improvement and overall pain control of left lower extremity and subjective improvement in overall swelling both left lower extremity and scrotal    On initial physical survey he appears to have extensive left medial thigh ecchymoses dark purple in color covering essentially entire anterior portion and posterior portion with ecchymoses covering essentially the right scrotal area with mild swelling he has no tenderness to palpation of left lower extremity and appears to be comfortable tolerating p.o. intake    Echocardiogram pending    He has not developed any transfusion related acute lung injury however he still bears monitoring I would like to repeat chest x-ray in a.m. 7/29/2024    He has thrombocytopenia but no mucosal bleeding    Blood glucoses over 200 mg/dL will continue current insulin regimen he may require adjustment of his insulin while inpatient    Blood pressures are adequate peak systolic noted today's 132 mmHg however as a precaution in light of high volume of PRBC transfusions and recent hemorrhagic shock I would like to gradually resume any antihypertensives as of today no resumption of any blood pressure medications he also appears to be on dual antiplatelet therapy as requested by cardiology cardiology is following the

## 2024-07-28 NOTE — PLAN OF CARE
Problem: Discharge Planning  Goal: Discharge to home or other facility with appropriate resources  Outcome: Progressing  Flowsheets (Taken 7/27/2024 2301)  Discharge to home or other facility with appropriate resources:   Identify barriers to discharge with patient and caregiver   Identify discharge learning needs (meds, wound care, etc)   Refer to discharge planning if patient needs post-hospital services based on physician order or complex needs related to functional status, cognitive ability or social support system     Problem: Pain  Goal: Verbalizes/displays adequate comfort level or baseline comfort level  Outcome: Progressing  Flowsheets (Taken 7/27/2024 2301)  Verbalizes/displays adequate comfort level or baseline comfort level:   Encourage patient to monitor pain and request assistance   Assess pain using appropriate pain scale     Problem: Safety - Adult  Goal: Free from fall injury  Outcome: Progressing  Flowsheets (Taken 7/27/2024 2301)  Free From Fall Injury:   Instruct family/caregiver on patient safety   Based on caregiver fall risk screen, instruct family/caregiver to ask for assistance with transferring infant if caregiver noted to have fall risk factors     Problem: Cardiovascular - Adult  Goal: Maintains optimal cardiac output and hemodynamic stability  Flowsheets (Taken 7/27/2024 2302)  Maintains optimal cardiac output and hemodynamic stability:   Monitor blood pressure and heart rate   Assess for signs of decreased cardiac output     Problem: Hematologic - Adult  Goal: Maintains hematologic stability  Flowsheets (Taken 7/27/2024 2302)  Maintains hematologic stability:   Assess for signs and symptoms of bleeding or hemorrhage   Administer blood products/factors as ordered     Care plan reviewed with patient and family at bedside

## 2024-07-28 NOTE — PROGRESS NOTES
Cardiology Progress Note      Patient:  Jon Ji  YOB: 1950  MRN: [id number]   Acct: [id number]  Admit Date:  7/26/2024  Primary Cardiologist:  dr mcghee, dr amaro    Note per dr aj   \"CHIEF COMPLAINT:  Hemorrhagic Shock        HPI: This is a pleasant 74 y.o. male presents with hx of CABG x 5, COPD, DVT/PE who had S3U26 TAVR done at [facility]--main access site was RFA, and the pigtail site was 6Fr manual via the L SFA.  He was at home today, grilling, and had episodes of significant sneezing earlier when he noticed a bulge in the left groin.  It continued to get worse and the swelling started to cause pain, lightheadedness, and sob.  He became pale and diaphoretic.  He contacted his cardiologist, Dr. Amaro.  He called the EMS.  He asked EMS to take him to [facility], but EMS brought him to [facility] because he was sob.  Hgb 7 down from 13.  He was placed on Levophed.  After initial stabilization, we called [facility]-Dr. Amaro who insisted that we manage the patient at our hospital.  Patient had OM stent done 1 month prior.  He is not on OAC.  He is in 10/10 pain, wife at bedside.  He is responsive and awake.  He has no cardiac discomfort.  He notes bruising of the RFA as well.      ED attempted transfer but again [facility] felt that she was better served here, thus Femostop applied while attempting stabilization, but patient continued to decompensate and ultimately we elected to proceed with emergency intervention tonight.     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  Hemorrhagic enlargement of left medial thigh muscles.  No vascular occlusions or stenosis.  13 mm nonobstructing calculus in the lower pole of the right kidney.\"    Subjective (Events in last 24 hours): pt awake and alert.  NAD.  No cp or sob.  No leg pain, just uncomfortable from the swelling in left leg  On RA    Objective:   BP (!) 132/58   Pulse 75   Temp 98.2 °F (36.8 °C)    sided hemorrhage, related to non-TAVR site  CAD - hx OM stent 1 month prior, hx CABG  HTN  Hx DVT/PE - s/p IVF filter  Hx COPD    Discussed with dr aj  Plan:    Cont asa/plavix/statin  Monitor h/h closely - scheduled every 6 hours  Ok for stepdown  Ace wrap or dionisio hose for LLE, elevate       Electronically signed by Moni Reynolds PA-C on 7/28/2024 at 1:49 PM

## 2024-07-28 NOTE — PROGRESS NOTES
Pt admitted to  4K19 from 4D      IV site free of s/s of infection or infiltration.   Vital signs obtained. Assessment and data collection initiated.   Two nurse skin assessment performed by Jaime DAS and Aleksander RN.  Oriented to room. Policies and procedures for 4K explained. All questions answered with no further questions at this time. Fall prevention and safety brochure discussed with patient.  Bed alarm on. Call light in reach.

## 2024-07-29 LAB
ANION GAP SERPL CALC-SCNC: 9 MEQ/L (ref 8–16)
BUN SERPL-MCNC: 13 MG/DL (ref 7–22)
CALCIUM SERPL-MCNC: 8.1 MG/DL (ref 8.5–10.5)
CHLORIDE SERPL-SCNC: 101 MEQ/L (ref 98–111)
CO2 SERPL-SCNC: 25 MEQ/L (ref 23–33)
CREAT SERPL-MCNC: 0.7 MG/DL (ref 0.4–1.2)
GFR SERPL CREATININE-BSD FRML MDRD: > 90 ML/MIN/1.73M2
GLUCOSE BLD STRIP.AUTO-MCNC: 187 MG/DL (ref 70–108)
GLUCOSE BLD STRIP.AUTO-MCNC: 228 MG/DL (ref 70–108)
GLUCOSE BLD STRIP.AUTO-MCNC: 237 MG/DL (ref 70–108)
GLUCOSE BLD STRIP.AUTO-MCNC: 254 MG/DL (ref 70–108)
GLUCOSE BLD STRIP.AUTO-MCNC: 282 MG/DL (ref 70–108)
GLUCOSE SERPL-MCNC: 189 MG/DL (ref 70–108)
HCT VFR BLD AUTO: 21.5 % (ref 42–52)
HCT VFR BLD AUTO: 23.4 % (ref 42–52)
HGB BLD-MCNC: 7.4 GM/DL (ref 14–18)
HGB BLD-MCNC: 7.8 GM/DL (ref 14–18)
POTASSIUM SERPL-SCNC: 4.3 MEQ/L (ref 3.5–5.2)
SODIUM SERPL-SCNC: 135 MEQ/L (ref 135–145)

## 2024-07-29 PROCEDURE — 6370000000 HC RX 637 (ALT 250 FOR IP)

## 2024-07-29 PROCEDURE — 82948 REAGENT STRIP/BLOOD GLUCOSE: CPT

## 2024-07-29 PROCEDURE — 94640 AIRWAY INHALATION TREATMENT: CPT

## 2024-07-29 PROCEDURE — 85014 HEMATOCRIT: CPT

## 2024-07-29 PROCEDURE — 36415 COLL VENOUS BLD VENIPUNCTURE: CPT

## 2024-07-29 PROCEDURE — 80048 BASIC METABOLIC PNL TOTAL CA: CPT

## 2024-07-29 PROCEDURE — 85018 HEMOGLOBIN: CPT

## 2024-07-29 PROCEDURE — 2060000000 HC ICU INTERMEDIATE R&B

## 2024-07-29 RX ORDER — OXYMETAZOLINE HYDROCHLORIDE 0.05 G/100ML
2 SPRAY NASAL 2 TIMES DAILY
Status: DISPENSED | OUTPATIENT
Start: 2024-07-29 | End: 2024-07-30

## 2024-07-29 RX ADMIN — ATORVASTATIN CALCIUM 40 MG: 80 TABLET, FILM COATED ORAL at 09:20

## 2024-07-29 RX ADMIN — SALINE NASAL SPRAY 1 SPRAY: 1.5 SOLUTION NASAL at 21:40

## 2024-07-29 RX ADMIN — INSULIN LISPRO 3 UNITS: 100 INJECTION, SOLUTION INTRAVENOUS; SUBCUTANEOUS at 12:16

## 2024-07-29 RX ADMIN — INSULIN GLARGINE 10 UNITS: 100 INJECTION, SOLUTION SUBCUTANEOUS at 21:40

## 2024-07-29 RX ADMIN — TIOTROPIUM BROMIDE AND OLODATEROL 2 PUFF: 3.124; 2.736 SPRAY, METERED RESPIRATORY (INHALATION) at 09:08

## 2024-07-29 RX ADMIN — INSULIN LISPRO 1 UNITS: 100 INJECTION, SOLUTION INTRAVENOUS; SUBCUTANEOUS at 09:21

## 2024-07-29 RX ADMIN — CLOPIDOGREL BISULFATE 75 MG: 75 TABLET ORAL at 09:21

## 2024-07-29 RX ADMIN — INSULIN LISPRO 2 UNITS: 100 INJECTION, SOLUTION INTRAVENOUS; SUBCUTANEOUS at 12:16

## 2024-07-29 RX ADMIN — ACETAMINOPHEN 650 MG: 325 TABLET ORAL at 12:05

## 2024-07-29 RX ADMIN — ASPIRIN 81 MG: 81 TABLET, COATED ORAL at 09:20

## 2024-07-29 RX ADMIN — INSULIN LISPRO 3 UNITS: 100 INJECTION, SOLUTION INTRAVENOUS; SUBCUTANEOUS at 18:19

## 2024-07-29 RX ADMIN — INSULIN LISPRO 3 UNITS: 100 INJECTION, SOLUTION INTRAVENOUS; SUBCUTANEOUS at 09:21

## 2024-07-29 ASSESSMENT — PAIN DESCRIPTION - DESCRIPTORS: DESCRIPTORS: ACHING

## 2024-07-29 ASSESSMENT — PAIN DESCRIPTION - ONSET: ONSET: ON-GOING

## 2024-07-29 ASSESSMENT — PAIN SCALES - GENERAL
PAINLEVEL_OUTOF10: 3
PAINLEVEL_OUTOF10: 0

## 2024-07-29 ASSESSMENT — PAIN DESCRIPTION - FREQUENCY: FREQUENCY: CONTINUOUS

## 2024-07-29 ASSESSMENT — PAIN DESCRIPTION - LOCATION: LOCATION: GROIN;LEG

## 2024-07-29 ASSESSMENT — PAIN - FUNCTIONAL ASSESSMENT: PAIN_FUNCTIONAL_ASSESSMENT: PREVENTS OR INTERFERES SOME ACTIVE ACTIVITIES AND ADLS

## 2024-07-29 ASSESSMENT — PAIN DESCRIPTION - ORIENTATION: ORIENTATION: LEFT

## 2024-07-29 ASSESSMENT — PAIN DESCRIPTION - PAIN TYPE: TYPE: ACUTE PAIN

## 2024-07-29 NOTE — PLAN OF CARE
Problem: Discharge Planning  Goal: Discharge to home or other facility with appropriate resources  Outcome: Progressing  Flowsheets (Taken 7/29/2024 1814)  Discharge to home or other facility with appropriate resources:   Identify barriers to discharge with patient and caregiver   Arrange for needed discharge resources and transportation as appropriate   Identify discharge learning needs (meds, wound care, etc)   Refer to discharge planning if patient needs post-hospital services based on physician order or complex needs related to functional status, cognitive ability or social support system     Problem: Pain  Goal: Verbalizes/displays adequate comfort level or baseline comfort level  Outcome: Progressing  Flowsheets (Taken 7/29/2024 1814)  Verbalizes/displays adequate comfort level or baseline comfort level:   Encourage patient to monitor pain and request assistance   Assess pain using appropriate pain scale   Administer analgesics based on type and severity of pain and evaluate response   Implement non-pharmacological measures as appropriate and evaluate response   Consider cultural and social influences on pain and pain management   Notify Licensed Independent Practitioner if interventions unsuccessful or patient reports new pain     Problem: Safety - Adult  Goal: Free from fall injury  Outcome: Progressing  Flowsheets (Taken 7/29/2024 1814)  Free From Fall Injury: Instruct family/caregiver on patient safety     Problem: Cardiovascular - Adult  Goal: Maintains optimal cardiac output and hemodynamic stability  Outcome: Progressing  Flowsheets (Taken 7/29/2024 1814)  Maintains optimal cardiac output and hemodynamic stability:   Monitor blood pressure and heart rate   Monitor urine output and notify Licensed Independent Practitioner for values outside of normal range     Problem: Hematologic - Adult  Goal: Maintains hematologic stability  Outcome: Progressing  Flowsheets (Taken 7/29/2024 1814)  Maintains

## 2024-07-29 NOTE — PROGRESS NOTES
Hospitalist Progress Note      Patient:  Jon Johnsonman    Unit/Bed:4K-19/019-A  YOB: 1950  MRN: [id number]   Acct: [id number]     PCP: Cailin Contreras APRN - CNP  Date of Admission: 7/26/2024     Chief Complaint:-Thigh hematoma    Assessment and Plan:    Hemorrhagic shock:-Resolved 2/2 TAVR 6F pigtail site (L SFA) hemorrhage.  Significant extravasation into left thigh.  Baseline hemoglobin 13.  Hemoglobin on arrival 8.7, repeat was 7.0.  S/p 6 units of PRBC, 4 units of plasma, 1 unit platelets.  Case discussed with Dr. Navarrete, who had been notified by ED.  Emergently taken to interventional suite for percutaneous intervention.  S/p L SFA stent.  Monitor kidney function and urine output given hypotension and significant amount of contrast.    Monitor for signs of compartment syndrome  Monitor pulses  Serial thigh measurements  Will get CBC every 6 hours for 1 day  Continue DAPT  7/29-hemoglobin stable currently on antiplatelets as per cardiology-patient feels better-okay for discharge per cardiology if hemoglobin stays overnight stable  Acute anemia: 2/2 hemorrhage.-Status post 6 units PRBC currently stable around 7.5-8 will continue to monitor closely and transfuse if less than 7  Acute Thrombocytopenia: Initially suspect 2/2 dilutional.  Slowly continuing to downtrend.  Will continue to monitor.  Patient currently taking ASA and Plavix.  CAD:  hx CABG and recent PCI. no indication of ACS.   DAPT  Statin  NIDDM 2: Last Hb a1c 5.3 7/27/2024. Home med includes metformin 1000 mg twice daily.    Hold oral antihyperglycemics  Low-dose sliding scale  POCT x 4  Hypoglycemia protocol  HTN: Home meds include amlodipine 5 mg daily, losartan 50 mg daily. Hold until BP proves stable.   COPD: Not in acute exacerbation. Continue LABA/LAMA. PRN albuterol  HLD: Continue statin  Nonobstructing calculus in lower pole of right kidney.  HAGMA: resolved    LDA: []CVC / []PICC / []Midline / []Wagner /  (H) 3.5 - 5.2 meq/L    Chloride 100 98 - 111 meq/L    CO2 17 (L) 23 - 33 meq/L    Glucose 365 (H) 70 - 108 mg/dL    BUN 17 7 - 22 mg/dL    Creatinine 1.1 0.4 - 1.2 mg/dL    Calcium 7.4 (L) 8.5 - 10.5 mg/dL   Magnesium    Collection Time: 07/27/24  1:55 AM   Result Value Ref Range    Magnesium 1.5 (L) 1.6 - 2.4 mg/dL   Phosphorus    Collection Time: 07/27/24  1:55 AM   Result Value Ref Range    Phosphorus 4.3 2.4 - 4.7 mg/dL   CBC    Collection Time: 07/27/24  1:55 AM   Result Value Ref Range    WBC 13.9 (H) 4.8 - 10.8 thou/mm3    RBC 3.34 (L) 4.70 - 6.10 mill/mm3    Hemoglobin 10.6 (L) 14.0 - 18.0 gm/dl    Hematocrit 30.7 (L) 42.0 - 52.0 %    MCV 91.9 80.0 - 94.0 fL    MCH 31.7 26.0 - 33.0 pg    MCHC 34.5 32.2 - 35.5 gm/dl    RDW-CV 13.9 11.5 - 14.5 %    RDW-SD 46.5 (H) 35.0 - 45.0 fL    Platelets 61 (L) 130 - 400 thou/mm3    MPV 10.7 9.4 - 12.4 fL   Anion Gap    Collection Time: 07/27/24  1:55 AM   Result Value Ref Range    Anion Gap 14.0 8.0 - 16.0 meq/L   Glomerular Filtration Rate, Estimated    Collection Time: 07/27/24  1:55 AM   Result Value Ref Range    Est, Glom Filt Rate 70 >60 ml/min/1.73m2   CK    Collection Time: 07/27/24  1:55 AM   Result Value Ref Range    Total  (H) 55 - 170 U/L   Hemoglobin A1c    Collection Time: 07/27/24  1:55 AM   Result Value Ref Range    Hemoglobin A1C 5.3 4.4 - 6.4 %    Estimated Avg Glucose 99 70 - 126 mg/dL   Blood Gas, Arterial    Collection Time: 07/27/24  2:02 AM   Result Value Ref Range    pH, Blood Gas 7.38 7.35 - 7.45    PCO2 32 (L) 35 - 45 mmhg    PO2 117 (H) 71 - 104 mmhg    HCO3 19 (L) 23 - 28 mmol/l    Base Excess (Calculated) -5.7 (L) -2.5 - 2.5 mmol/l    O2 Sat 99 %    IFIO2 6     DEVICE Cannula     Mode Not entered     Popeye Test N/A     Source: Art Line     COLLECTED BY: [id number]    Echo (TTE) complete (PRN contrast/bubble/strain/3D)    Collection Time: 07/27/24  6:30 AM   Result Value Ref Range    LA Major [place] 6.5 cm    LA Area 4C 23.2 cm2    LA Volume MOD

## 2024-07-29 NOTE — PROGRESS NOTES
Cardiology Progress Note      Patient:  Jon Ji  YOB: 1950  MRN: [id number]   Acct: [id number]  Admit Date:  7/26/2024  Primary Cardiologist: Carolann  Seen by Dr. Aj     Per prior cardiology consult note-  Note per dr aj   \"CHIEF COMPLAINT:  Hemorrhagic Shock        HPI: This is a pleasant 74 y.o. male presents with hx of CABG x 5, COPD, DVT/PE who had S3U26 TAVR done at [facility]--main access site was RFA, and the pigtail site was 6Fr manual via the L SFA.  He was at home today, grilling, and had episodes of significant sneezing earlier when he noticed a bulge in the left groin.  It continued to get worse and the swelling started to cause pain, lightheadedness, and sob.  He became pale and diaphoretic.  He contacted his cardiologist, Dr. Amaro.  He called the EMS.  He asked EMS to take him to [facility], but EMS brought him to [facility] because he was sob.  Hgb 7 down from 13.  He was placed on Levophed.  After initial stabilization, we called [facility]-Dr. Amaro who insisted that we manage the patient at our hospital.  Patient had OM stent done 1 month prior.  He is not on OAC.  He is in 10/10 pain, wife at bedside.  He is responsive and awake.  He has no cardiac discomfort.  He notes bruising of the RFA as well.      ED attempted transfer but again [facility] felt that she was better served here, thus Femostop applied while attempting stabilization, but patient continued to decompensate and ultimately we elected to proceed with emergency intervention tonight.         Subjective (Events in last 24 hours):   Pt up in chair   Pleasant     Does co Lt upper inner thigh tenderness - no pain like when he came in     Noted bruising to scrotum and Lt inner leg extending don to knee  Lt leg swollen but soft - ACE wrap in place to entire LT leg     Tele SR no ectopy   BP stable     Pulses present to LT Leg pop - post tib and pedal - warm and neurovascular check WNL       Objective:   /66   Pulse 75   Temp 98.1 °F  with a visually estimated EF of 60 - 65%. Left ventricle size is normal. Moderately increased wall thickness. Normal wall motion. Diastolic dysfunction present with normal LV EF.   Left Atrium Left atrium size is normal.   Right Ventricle Right ventricle size is normal. Normal systolic function.   Right Atrium Right atrium size is normal.   Aortic Valve Appropriately positioned transcatheter bioprosthetic valve that is well-seated. AV mean gradient is 16 mmHg. No regurgitation. Trace paravalvular regurgitation. No stenosis. Normal prosthetic gradient. AV mean gradient is 16 mmHg. AV peak gradient is 25 mmHg. AV peak velocity is 2.5 m/s. AV area by continuity VTI is 1.9 cm2.   Mitral Valve Valve structure is normal. No regurgitation. No stenosis noted.   Tricuspid Valve Valve structure is normal. Trace regurgitation. The estimated RVSP is 36 mmHg. No stenosis noted.   Pulmonic Valve The pulmonic valve visualization is suboptimal but appears to be functioning normally. Valve structure is normal. No regurgitation. No stenosis noted.   Aorta Normal sized aortic root. Ao ascending diameter is 3.6 cm.   IVC/Hepatic Veins IVC diameter is greater than 21 mm and decreases greater than 50% during inspiration; therefore the estimated right atrial pressure is intermediate (~8 mmHg). IVC size is normal.   Pericardium No pericardial effusion.         Stress:     Left Heart Cath:     Lab Data:    Cardiac Enzymes:  Recent Labs     07/27/24  0155   CKTOTAL 353*       CBC:   Lab Results   Component Value Date/Time    WBC 7.9 07/28/2024 11:15 AM    RBC 2.52 07/28/2024 11:15 AM    HGB 7.8 07/29/2024 10:53 AM    HCT 23.4 07/29/2024 10:53 AM    PLT 52 07/28/2024 11:15 AM       CMP:    Lab Results   Component Value Date/Time     07/29/2024 03:49 AM    K 4.3 07/29/2024 03:49 AM     07/29/2024 03:49 AM    CO2 25 07/29/2024 03:49 AM    BUN 13 07/29/2024 03:49 AM    CREATININE 0.7 07/29/2024 03:49 AM    LABGLOM > 90 07/29/2024

## 2024-07-29 NOTE — DISCHARGE INSTRUCTIONS
ACE wrap left leg until swelling goes down per cardiology     Please monitor your blood pressure daily at home, do not take blood pressure medication if blood pressure is below 90/50. You can resume amlodipine 5 mg daily on discharge, as long as your blood pressure is not low. If your blood pressure is above >130/90 despite of being on amlodipine, then you can resume losartan 50 mg daily as well. Please follow up with primary care provider within one week of discharge. Please get lab work within week of discharge.

## 2024-07-29 NOTE — CARE COORDINATION
Case Management Assessment Initial Evaluation    Date/Time of Evaluation: 2024 8:15 AM  Assessment Completed by: Radha Enriquez RN    If patient is discharged prior to next notation, then this note serves as note for discharge by case management.    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 Admitted with hemorrhagic shock. Emergently take to cath lab with Dr. Navarrete.  ASA. Lipitor. Plavix.     Procedures:    Peripheral angiogram    Imagin/27 CT chest:   Stable postoperative findings from prior left thoracotomy.  Chronic linear scar is present in the posterior left lung tissue. No acute   consolidations.  TAVR in satisfactory alignment with no pseudoaneurysm and no pericardial   fluid.   CTA abdominal aorta:   1.5 x 2.9 cm lobular well-circumscribed pseudo aneurism anterior to the   left common femoral artery.  Hemorrhagic enlargement of left medial thigh muscles.  No vascular occlusions or stenosis.  13 mm nonobstructing calculus in the lower pole of the right kidney.   CXR:   No acute cardiopulmonary disease. Stable study.   Patient Goals/Plan/Treatment Preferences: Spoke with Jon, he is from home with his wife. He is independent and does not anticipate discharge needs.        24 1322   Service Assessment   Patient Orientation Alert and Oriented   Cognition Alert   History Provided By Patient   Primary

## 2024-07-29 NOTE — PLAN OF CARE
Problem: Discharge Planning  Goal: Discharge to home or other facility with appropriate resources  Outcome: Progressing  Flowsheets (Taken 7/28/2024 2200)  Discharge to home or other facility with appropriate resources: Identify barriers to discharge with patient and caregiver     Problem: Pain  Goal: Verbalizes/displays adequate comfort level or baseline comfort level  Outcome: Progressing     Problem: Safety - Adult  Goal: Free from fall injury  Outcome: Progressing     Problem: Cardiovascular - Adult  Goal: Maintains optimal cardiac output and hemodynamic stability  Outcome: Progressing  Flowsheets (Taken 7/28/2024 2200)  Maintains optimal cardiac output and hemodynamic stability: Monitor blood pressure and heart rate     Problem: Hematologic - Adult  Goal: Maintains hematologic stability  Outcome: Progressing  Flowsheets (Taken 7/28/2024 2200)  Maintains hematologic stability: Assess for signs and symptoms of bleeding or hemorrhage   Care plan reviewed with patient.  Patient verbalize understanding of the plan of care and contribute to goal setting.

## 2024-07-30 ENCOUNTER — APPOINTMENT (OUTPATIENT)
Dept: INTERVENTIONAL RADIOLOGY/VASCULAR | Age: 74
End: 2024-07-30
Payer: OTHER GOVERNMENT

## 2024-07-30 LAB
ABO: NORMAL
ANION GAP SERPL CALC-SCNC: 9 MEQ/L (ref 8–16)
ANTIBODY SCREEN: NORMAL
AUTO DIFF PNL BLD: ABNORMAL
BASO STIPL BLD QL SMEAR: ABNORMAL
BASOPHILS ABSOLUTE: 0 THOU/MM3 (ref 0–0.1)
BASOPHILS NFR BLD AUTO: 0.4 %
BUN SERPL-MCNC: 12 MG/DL (ref 7–22)
CALCIUM SERPL-MCNC: 8.1 MG/DL (ref 8.5–10.5)
CHLORIDE SERPL-SCNC: 100 MEQ/L (ref 98–111)
CO2 SERPL-SCNC: 24 MEQ/L (ref 23–33)
CREAT SERPL-MCNC: 0.7 MG/DL (ref 0.4–1.2)
DEPRECATED RDW RBC AUTO: 46.5 FL (ref 35–45)
EOSINOPHIL NFR BLD AUTO: 1.8 %
EOSINOPHILS ABSOLUTE: 0.1 THOU/MM3 (ref 0–0.4)
ERYTHROCYTE [DISTWIDTH] IN BLOOD BY AUTOMATED COUNT: 14.1 % (ref 11.5–14.5)
GFR SERPL CREATININE-BSD FRML MDRD: > 90 ML/MIN/1.73M2
GLUCOSE BLD STRIP.AUTO-MCNC: 212 MG/DL (ref 70–108)
GLUCOSE BLD STRIP.AUTO-MCNC: 218 MG/DL (ref 70–108)
GLUCOSE BLD STRIP.AUTO-MCNC: 248 MG/DL (ref 70–108)
GLUCOSE BLD STRIP.AUTO-MCNC: 252 MG/DL (ref 70–108)
GLUCOSE SERPL-MCNC: 182 MG/DL (ref 70–108)
HCT VFR BLD AUTO: 19.3 % (ref 42–52)
HCT VFR BLD AUTO: 24.2 % (ref 42–52)
HGB BLD-MCNC: 6.6 GM/DL (ref 14–18)
HGB BLD-MCNC: 8.1 GM/DL (ref 14–18)
IMM GRANULOCYTES # BLD AUTO: 0.07 THOU/MM3 (ref 0–0.07)
IMM GRANULOCYTES NFR BLD AUTO: 1.4 %
LYMPHOCYTES ABSOLUTE: 0.6 THOU/MM3 (ref 1–4.8)
LYMPHOCYTES NFR BLD AUTO: 11 %
MCH RBC QN AUTO: 31.9 PG (ref 26–33)
MCHC RBC AUTO-ENTMCNC: 34.2 GM/DL (ref 32.2–35.5)
MCV RBC AUTO: 93.2 FL (ref 80–94)
MONOCYTES ABSOLUTE: 0.7 THOU/MM3 (ref 0.4–1.3)
MONOCYTES NFR BLD AUTO: 13 %
NEUTROPHILS ABSOLUTE: 3.6 THOU/MM3 (ref 1.8–7.7)
NEUTROPHILS NFR BLD AUTO: 72.4 %
NRBC BLD AUTO-RTO: 0 /100 WBC
PATHOLOGIST REVIEW: ABNORMAL
PLATELET # BLD AUTO: 57 THOU/MM3 (ref 130–400)
PLATELET BLD QL SMEAR: ABNORMAL
PMV BLD AUTO: 11.2 FL (ref 9.4–12.4)
POLYCHROMASIA BLD QL SMEAR: ABNORMAL
POTASSIUM SERPL-SCNC: 4.1 MEQ/L (ref 3.5–5.2)
RBC # BLD AUTO: 2.07 MILL/MM3 (ref 4.7–6.1)
RH FACTOR: NORMAL
SCAN OF BLOOD SMEAR: NORMAL
SODIUM SERPL-SCNC: 133 MEQ/L (ref 135–145)
WBC # BLD AUTO: 5 THOU/MM3 (ref 4.8–10.8)

## 2024-07-30 PROCEDURE — 82948 REAGENT STRIP/BLOOD GLUCOSE: CPT

## 2024-07-30 PROCEDURE — 94640 AIRWAY INHALATION TREATMENT: CPT

## 2024-07-30 PROCEDURE — P9016 RBC LEUKOCYTES REDUCED: HCPCS

## 2024-07-30 PROCEDURE — 36430 TRANSFUSION BLD/BLD COMPNT: CPT

## 2024-07-30 PROCEDURE — 85018 HEMOGLOBIN: CPT

## 2024-07-30 PROCEDURE — 80048 BASIC METABOLIC PNL TOTAL CA: CPT

## 2024-07-30 PROCEDURE — 85014 HEMATOCRIT: CPT

## 2024-07-30 PROCEDURE — 86900 BLOOD TYPING SEROLOGIC ABO: CPT

## 2024-07-30 PROCEDURE — 36415 COLL VENOUS BLD VENIPUNCTURE: CPT

## 2024-07-30 PROCEDURE — 86850 RBC ANTIBODY SCREEN: CPT

## 2024-07-30 PROCEDURE — 2060000000 HC ICU INTERMEDIATE R&B

## 2024-07-30 PROCEDURE — 86923 COMPATIBILITY TEST ELECTRIC: CPT

## 2024-07-30 PROCEDURE — 6370000000 HC RX 637 (ALT 250 FOR IP)

## 2024-07-30 PROCEDURE — 85025 COMPLETE CBC W/AUTO DIFF WBC: CPT

## 2024-07-30 PROCEDURE — 94761 N-INVAS EAR/PLS OXIMETRY MLT: CPT

## 2024-07-30 PROCEDURE — 93925 LOWER EXTREMITY STUDY: CPT

## 2024-07-30 PROCEDURE — 86901 BLOOD TYPING SEROLOGIC RH(D): CPT

## 2024-07-30 RX ORDER — SODIUM CHLORIDE 9 MG/ML
INJECTION, SOLUTION INTRAVENOUS PRN
Status: DISCONTINUED | OUTPATIENT
Start: 2024-07-30 | End: 2024-07-30

## 2024-07-30 RX ADMIN — CLOPIDOGREL BISULFATE 75 MG: 75 TABLET ORAL at 11:32

## 2024-07-30 RX ADMIN — INSULIN LISPRO 3 UNITS: 100 INJECTION, SOLUTION INTRAVENOUS; SUBCUTANEOUS at 12:55

## 2024-07-30 RX ADMIN — INSULIN LISPRO 1 UNITS: 100 INJECTION, SOLUTION INTRAVENOUS; SUBCUTANEOUS at 12:54

## 2024-07-30 RX ADMIN — INSULIN GLARGINE 10 UNITS: 100 INJECTION, SOLUTION SUBCUTANEOUS at 21:46

## 2024-07-30 RX ADMIN — TIOTROPIUM BROMIDE AND OLODATEROL 2 PUFF: 3.124; 2.736 SPRAY, METERED RESPIRATORY (INHALATION) at 09:02

## 2024-07-30 RX ADMIN — INSULIN LISPRO 2 UNITS: 100 INJECTION, SOLUTION INTRAVENOUS; SUBCUTANEOUS at 09:15

## 2024-07-30 RX ADMIN — INSULIN LISPRO 1 UNITS: 100 INJECTION, SOLUTION INTRAVENOUS; SUBCUTANEOUS at 18:00

## 2024-07-30 RX ADMIN — ATORVASTATIN CALCIUM 40 MG: 80 TABLET, FILM COATED ORAL at 11:32

## 2024-07-30 RX ADMIN — INSULIN LISPRO 3 UNITS: 100 INJECTION, SOLUTION INTRAVENOUS; SUBCUTANEOUS at 09:16

## 2024-07-30 RX ADMIN — ASPIRIN 81 MG: 81 TABLET, COATED ORAL at 11:32

## 2024-07-30 RX ADMIN — INSULIN LISPRO 3 UNITS: 100 INJECTION, SOLUTION INTRAVENOUS; SUBCUTANEOUS at 18:00

## 2024-07-30 RX ADMIN — SALINE NASAL SPRAY 1 SPRAY: 1.5 SOLUTION NASAL at 11:32

## 2024-07-30 ASSESSMENT — PAIN SCALES - GENERAL
PAINLEVEL_OUTOF10: 0
PAINLEVEL_OUTOF10: 5
PAINLEVEL_OUTOF10: 0
PAINLEVEL_OUTOF10: 0

## 2024-07-30 ASSESSMENT — PAIN DESCRIPTION - LOCATION: LOCATION: GROIN

## 2024-07-30 ASSESSMENT — PAIN DESCRIPTION - DESCRIPTORS: DESCRIPTORS: DISCOMFORT

## 2024-07-30 ASSESSMENT — PAIN - FUNCTIONAL ASSESSMENT: PAIN_FUNCTIONAL_ASSESSMENT: PREVENTS OR INTERFERES SOME ACTIVE ACTIVITIES AND ADLS

## 2024-07-30 NOTE — PLAN OF CARE
Problem: Discharge Planning  Goal: Discharge to home or other facility with appropriate resources  Outcome: Progressing     Problem: Pain  Goal: Verbalizes/displays adequate comfort level or baseline comfort level  Outcome: Progressing     Problem: Safety - Adult  Goal: Free from fall injury  Outcome: Progressing     Problem: Cardiovascular - Adult  Goal: Maintains optimal cardiac output and hemodynamic stability  Outcome: Progressing     Problem: Hematologic - Adult  Goal: Maintains hematologic stability  Outcome: Progressing     Problem: Chronic Conditions and Co-morbidities  Goal: Patient's chronic conditions and co-morbidity symptoms are monitored and maintained or improved  Outcome: Progressing     Problem: ABCDS Injury Assessment  Goal: Absence of physical injury  Outcome: Progressing     Problem: Musculoskeletal - Adult  Goal: Return ADL status to a safe level of function  Outcome: Progressing     Problem: Gastrointestinal - Adult  Goal: Maintains or returns to baseline bowel function  Outcome: Progressing     Problem: Genitourinary - Adult  Goal: Absence of urinary retention  Outcome: Progressing

## 2024-07-30 NOTE — CARE COORDINATION
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

## 2024-07-30 NOTE — PROGRESS NOTES
Cardiology Progress Note      Patient:  oJn Ji  YOB: 1950  MRN: [id number]   Acct: [id number]  Admit Date:  7/26/2024  Primary Cardiologist: Carolann  Seen by Dr. Aj     Per prior cardiology consult note-  Note per dr aj   \"CHIEF COMPLAINT:  Hemorrhagic Shock        HPI: This is a pleasant 74 y.o. male presents with hx of CABG x 5, COPD, DVT/PE who had S3U26 TAVR done at [facility]--main access site was RFA, and the pigtail site was 6Fr manual via the L SFA.  He was at home today, grilling, and had episodes of significant sneezing earlier when he noticed a bulge in the left groin.  It continued to get worse and the swelling started to cause pain, lightheadedness, and sob.  He became pale and diaphoretic.  He contacted his cardiologist, Dr. Amaro.  He called the EMS.  He asked EMS to take him to [facility], but EMS brought him to [facility] because he was sob.  Hgb 7 down from 13.  He was placed on Levophed.  After initial stabilization, we called [facility]-Dr. Amaro who insisted that we manage the patient at our hospital.  Patient had OM stent done 1 month prior.  He is not on OAC.  He is in 10/10 pain, wife at bedside.  He is responsive and awake.  He has no cardiac discomfort.  He notes bruising of the RFA as well.      ED attempted transfer but again [facility] felt that she was better served here, thus Femostop applied while attempting stabilization, but patient continued to decompensate and ultimately we elected to proceed with emergency intervention tonight.         Subjective (Events in last 24 hours):   Pt up in chair   Pleasant     Does co Lt upper inner thigh tenderness - no pain like when he came in     Noted bruising to scrotum and Lt inner leg extending don to knee  Lt leg swollen but soft - ACE wrap in place to entire LT leg     Tele SR no ectopy   BP stable     Pulses present to LT Leg pop - post tib and pedal - warm and neurovascular check WNL       7/30/2024  Pt up in chair   No chest pain or SOB  No pericardial effusion.         Stress:     Left Heart Cath:     Lab Data:    Cardiac Enzymes:  No results for input(s): \"CKTOTAL\", \"CKMB\", \"CKMBINDEX\", \"TROPONINI\" in the last 72 hours.      CBC:   Lab Results   Component Value Date/Time    WBC 5.0 07/30/2024 03:43 AM    RBC 2.07 07/30/2024 03:43 AM    HGB 6.6 07/30/2024 03:43 AM    HCT 19.3 07/30/2024 03:43 AM    PLT 57 07/30/2024 03:43 AM       CMP:    Lab Results   Component Value Date/Time     07/30/2024 03:43 AM    K 4.1 07/30/2024 03:43 AM     07/30/2024 03:43 AM    CO2 24 07/30/2024 03:43 AM    BUN 12 07/30/2024 03:43 AM    CREATININE 0.7 07/30/2024 03:43 AM    LABGLOM > 90 07/30/2024 03:43 AM    LABGLOM >60 02/09/2023 07:00 AM    GLUCOSE 182 07/30/2024 03:43 AM    GLUCOSE 171 04/13/2023 07:48 AM    CALCIUM 8.1 07/30/2024 03:43 AM       Hepatic Function Panel:    Lab Results   Component Value Date/Time    ALKPHOS 51 07/27/2024 07:45 AM    ALT 24 07/27/2024 07:45 AM    AST 63 07/27/2024 07:45 AM    BILITOT 1.1 07/27/2024 07:45 AM    BILIDIR 0.4 07/27/2024 07:45 AM       Magnesium:    Lab Results   Component Value Date/Time    MG 2.0 07/27/2024 07:45 AM       PT/INR:    Lab Results   Component Value Date/Time    INR 1.14 07/28/2024 11:15 AM       HgBA1c:    Lab Results   Component Value Date/Time    LABA1C 5.3 07/27/2024 01:55 AM       FLP:    Lab Results   Component Value Date/Time    TRIG 96 04/25/2022 12:00 AM    HDL 30 04/25/2022 12:00 AM       TSH:  No results found for: \"TSH\"      Assessment:    Acute on chronic anemia   - for PRBC today - hgb 6.6  - US BL arterial LE     Hemorrhagic shock LT SFA  - hgb 7 on arrival   - sp rapid transfusion       Thrombocytopenia  57 today     CT abdomen:  1.5 x 2.9 cm lobular well-circumscribed pseudo aneurism anterior to the   left common femoral artery.  Hemorrhagic enlargement of left medial thigh muscles.    Sp Successful left SFA 8 x 50 Viabahn self-expanding prosthesis implantation for acute SFA

## 2024-07-30 NOTE — PROGRESS NOTES
21.5 (L) 42.0 - 52.0 %   Basic Metabolic Panel    Collection Time: 07/29/24  3:49 AM   Result Value Ref Range    Sodium 135 135 - 145 meq/L    Potassium 4.3 3.5 - 5.2 meq/L    Chloride 101 98 - 111 meq/L    CO2 25 23 - 33 meq/L    Glucose 189 (H) 70 - 108 mg/dL    BUN 13 7 - 22 mg/dL    Creatinine 0.7 0.4 - 1.2 mg/dL    Calcium 8.1 (L) 8.5 - 10.5 mg/dL   Anion Gap    Collection Time: 07/29/24  3:49 AM   Result Value Ref Range    Anion Gap 9.0 8.0 - 16.0 meq/L   Glomerular Filtration Rate, Estimated    Collection Time: 07/29/24  3:49 AM   Result Value Ref Range    Est, Glom Filt Rate > 90 >60 ml/min/1.73m2   POCT glucose    Collection Time: 07/29/24  8:34 AM   Result Value Ref Range    POC Glucose 228 (H) 70 - 108 mg/dl   Hemoglobin and Hematocrit    Collection Time: 07/29/24 10:53 AM   Result Value Ref Range    Hemoglobin 7.8 (L) 14.0 - 18.0 gm/dl    Hematocrit 23.4 (L) 42.0 - 52.0 %   POCT glucose    Collection Time: 07/29/24 12:10 PM   Result Value Ref Range    POC Glucose 254 (H) 70 - 108 mg/dl   POCT glucose    Collection Time: 07/29/24  5:01 PM   Result Value Ref Range    POC Glucose 187 (H) 70 - 108 mg/dl   POCT glucose    Collection Time: 07/29/24  7:40 PM   Result Value Ref Range    POC Glucose 282 (H) 70 - 108 mg/dl   POCT glucose    Collection Time: 07/29/24  9:41 PM   Result Value Ref Range    POC Glucose 237 (H) 70 - 108 mg/dl   Basic Metabolic Panel    Collection Time: 07/30/24  3:43 AM   Result Value Ref Range    Sodium 133 (L) 135 - 145 meq/L    Potassium 4.1 3.5 - 5.2 meq/L    Chloride 100 98 - 111 meq/L    CO2 24 23 - 33 meq/L    Glucose 182 (H) 70 - 108 mg/dL    BUN 12 7 - 22 mg/dL    Creatinine 0.7 0.4 - 1.2 mg/dL    Calcium 8.1 (L) 8.5 - 10.5 mg/dL   CBC with Auto Differential    Collection Time: 07/30/24  3:43 AM   Result Value Ref Range    WBC 5.0 4.8 - 10.8 thou/mm3    RBC 2.07 (L) 4.70 - 6.10 mill/mm3    Hemoglobin 6.6 (LL) 14.0 - 18.0 gm/dl    Hematocrit 19.3 (LL) 42.0 - 52.0 %    MCV 93.2  is well-seated. AV mean gradient is 16 mmHg. No regurgitation. Trace paravalvular regurgitation. No stenosis. Normal prosthetic gradient. AV mean gradient is 16 mmHg. AV peak gradient is 25 mmHg. AV peak velocity is 2.5 m/s. AV area by continuity VTI is 1.9 cm2.   Aorta: Normal sized aortic root. Ao ascending diameter is 3.6 cm.   Pericardium: No pericardial effusion.   IVC/SVC: IVC diameter is greater than 21 mm and decreases greater than 50% during inspiration; therefore the estimated right atrial pressure is intermediate (~8 mmHg). IVC size is normal.   Image quality is technically difficult. Technically difficult study, technically difficult study due to patient's body habitus and procedure performed with the patient in a supine position.     XR CHEST PORTABLE    Result Date: 7/27/2024  Chest X-ray: 1 view. Indication: Postprocedure. Comparison: CR/SR - XR CHEST PORTABLE - 07/26/2024 08:55 PM EDT Findings: Right internal jugular catheter with tip in the SVC. No focal consolidation, or pleural effusion. Curvilinear calcifications in both lung bases, likely pleural calcifications. Calcified granuloma right upper lobe. No focal consolidation or significant pleural effusion. Linear atelectasis/scarring left mid lung. The cardiac silhouette is normal in size. Prior sternotomy. Aortic atherosclerotic calcifications. Aortic valvular prosthesis. Bony thorax is grossly intact. Partially imaged IVC filter     Impression: No acute cardiopulmonary disease. Stable study. This document has been electronically signed by: Saravanan Vee MD on 07/27/2024 02:13 AM    IR AORTAGRAM ABDOMINAL SERIALOGRAM    Result Date: 7/27/2024  Radiology exam is complete. No Radiologist dictation. Please follow up with ordering provider.     XR CHEST PORTABLE    Result Date: 7/26/2024  Chest X-ray: 1 view. Indication: Central line placement. Comparison: CT/SR - CT CHEST W CONTRAST - 07/26/2024 06:43 PM EDT Findings: Right internal jugular catheter

## 2024-07-31 LAB
ANION GAP SERPL CALC-SCNC: 9 MEQ/L (ref 8–16)
BUN SERPL-MCNC: 13 MG/DL (ref 7–22)
CALCIUM SERPL-MCNC: 8.1 MG/DL (ref 8.5–10.5)
CHLORIDE SERPL-SCNC: 102 MEQ/L (ref 98–111)
CO2 SERPL-SCNC: 23 MEQ/L (ref 23–33)
CREAT SERPL-MCNC: 0.7 MG/DL (ref 0.4–1.2)
GFR SERPL CREATININE-BSD FRML MDRD: > 90 ML/MIN/1.73M2
GLUCOSE BLD STRIP.AUTO-MCNC: 184 MG/DL (ref 70–108)
GLUCOSE BLD STRIP.AUTO-MCNC: 215 MG/DL (ref 70–108)
GLUCOSE BLD STRIP.AUTO-MCNC: 219 MG/DL (ref 70–108)
GLUCOSE BLD STRIP.AUTO-MCNC: 222 MG/DL (ref 70–108)
GLUCOSE SERPL-MCNC: 189 MG/DL (ref 70–108)
HCT VFR BLD AUTO: 23 % (ref 42–52)
HCT VFR BLD AUTO: 23.4 % (ref 42–52)
HCT VFR BLD AUTO: 23.8 % (ref 42–52)
HGB BLD-MCNC: 7.6 GM/DL (ref 14–18)
HGB BLD-MCNC: 7.8 GM/DL (ref 14–18)
HGB BLD-MCNC: 7.8 GM/DL (ref 14–18)
POTASSIUM SERPL-SCNC: 4.1 MEQ/L (ref 3.5–5.2)
SODIUM SERPL-SCNC: 134 MEQ/L (ref 135–145)

## 2024-07-31 PROCEDURE — 80048 BASIC METABOLIC PNL TOTAL CA: CPT

## 2024-07-31 PROCEDURE — 6370000000 HC RX 637 (ALT 250 FOR IP)

## 2024-07-31 PROCEDURE — 2060000000 HC ICU INTERMEDIATE R&B

## 2024-07-31 PROCEDURE — 85018 HEMOGLOBIN: CPT

## 2024-07-31 PROCEDURE — 94640 AIRWAY INHALATION TREATMENT: CPT

## 2024-07-31 PROCEDURE — 36415 COLL VENOUS BLD VENIPUNCTURE: CPT

## 2024-07-31 PROCEDURE — 82948 REAGENT STRIP/BLOOD GLUCOSE: CPT

## 2024-07-31 PROCEDURE — 85014 HEMATOCRIT: CPT

## 2024-07-31 PROCEDURE — 94761 N-INVAS EAR/PLS OXIMETRY MLT: CPT

## 2024-07-31 RX ADMIN — CLOPIDOGREL BISULFATE 75 MG: 75 TABLET ORAL at 08:25

## 2024-07-31 RX ADMIN — INSULIN LISPRO 1 UNITS: 100 INJECTION, SOLUTION INTRAVENOUS; SUBCUTANEOUS at 12:52

## 2024-07-31 RX ADMIN — INSULIN LISPRO 3 UNITS: 100 INJECTION, SOLUTION INTRAVENOUS; SUBCUTANEOUS at 18:49

## 2024-07-31 RX ADMIN — INSULIN GLARGINE 10 UNITS: 100 INJECTION, SOLUTION SUBCUTANEOUS at 22:12

## 2024-07-31 RX ADMIN — INSULIN LISPRO 1 UNITS: 100 INJECTION, SOLUTION INTRAVENOUS; SUBCUTANEOUS at 10:57

## 2024-07-31 RX ADMIN — TIOTROPIUM BROMIDE AND OLODATEROL 2 PUFF: 3.124; 2.736 SPRAY, METERED RESPIRATORY (INHALATION) at 08:05

## 2024-07-31 RX ADMIN — ATORVASTATIN CALCIUM 40 MG: 80 TABLET, FILM COATED ORAL at 08:25

## 2024-07-31 RX ADMIN — INSULIN LISPRO 3 UNITS: 100 INJECTION, SOLUTION INTRAVENOUS; SUBCUTANEOUS at 10:57

## 2024-07-31 RX ADMIN — INSULIN LISPRO 3 UNITS: 100 INJECTION, SOLUTION INTRAVENOUS; SUBCUTANEOUS at 12:52

## 2024-07-31 RX ADMIN — ASPIRIN 81 MG: 81 TABLET, COATED ORAL at 08:25

## 2024-07-31 ASSESSMENT — PAIN SCALES - GENERAL
PAINLEVEL_OUTOF10: 0

## 2024-07-31 NOTE — PROGRESS NOTES
Cardiology Progress Note      Patient:  Jon iJ  YOB: 1950  MRN: [id number]   Acct: [id number]  Admit Date:  7/26/2024  Primary Cardiologist: Carolann  Seen by Dr. Aj     Per prior cardiology consult note-  Note per dr aj   \"CHIEF COMPLAINT:  Hemorrhagic Shock        HPI: This is a pleasant 74 y.o. male presents with hx of CABG x 5, COPD, DVT/PE who had S3U26 TAVR done at [facility]--main access site was RFA, and the pigtail site was 6Fr manual via the L SFA.  He was at home today, grilling, and had episodes of significant sneezing earlier when he noticed a bulge in the left groin.  It continued to get worse and the swelling started to cause pain, lightheadedness, and sob.  He became pale and diaphoretic.  He contacted his cardiologist, Dr. Amaro.  He called the EMS.  He asked EMS to take him to [facility], but EMS brought him to [facility] because he was sob.  Hgb 7 down from 13.  He was placed on Levophed.  After initial stabilization, we called [facility]-Dr. Amaro who insisted that we manage the patient at our hospital.  Patient had OM stent done 1 month prior.  He is not on OAC.  He is in 10/10 pain, wife at bedside.  He is responsive and awake.  He has no cardiac discomfort.  He notes bruising of the RFA as well.      ED attempted transfer but again [facility] felt that she was better served here, thus Femostop applied while attempting stabilization, but patient continued to decompensate and ultimately we elected to proceed with emergency intervention tonight.         Subjective (Events in last 24 hours):   Pt up in chair   Pleasant     Does co Lt upper inner thigh tenderness - no pain like when he came in     Noted bruising to scrotum and Lt inner leg extending don to knee  Lt leg swollen but soft - ACE wrap in place to entire LT leg     Tele SR no ectopy   BP stable     Pulses present to LT Leg pop - post tib and pedal - warm and neurovascular check WNL       7/30/2024  Pt up in chair   No chest pain or SOB

## 2024-07-31 NOTE — PROGRESS NOTES
Hospitalist Progress Note      Patient:  Jon Johnsonman    Unit/Bed:4K-19/019-A  YOB: 1950  MRN: [id number]   Acct: [id number]     PCP: Cailin Contreras APRN - CNP  Date of Admission: 7/26/2024     Chief Complaint:-Thigh hematoma    Assessment and Plan:    Hemorrhagic shock:-Resolved 2/2 TAVR 6F pigtail site (L SFA) hemorrhage.  Significant extravasation into left thigh.  Baseline hemoglobin 13.  Hemoglobin on arrival 8.7, repeat was 7.0.  S/p 6 units of PRBC, 4 units of plasma, 1 unit platelets.  Case discussed with Dr. Navarrete, who had been notified by ED.  Emergently taken to interventional suite for percutaneous intervention.  S/p L SFA stent.  Monitor kidney function and urine output given hypotension and significant amount of contrast.    Monitor for signs of compartment syndrome  Monitor pulses  Serial thigh measurements  Will get CBC every 6 hours for 1 day  Continue DAPT  7/29-hemoglobin stable currently on antiplatelets as per cardiology-patient feels better-okay for discharge per cardiology if hemoglobin stays overnight stable  7/30-hemoglobin 6.6 today morning vital stable-discussed with cardiology okay for blood transfusion did not recommend CTA.  Will give 1 unit and check hemoglobin after if less than 7 will transfuse a second unit-discussed with RN  7/31-hemoglobin slightly dropped after the transfusion from 8.1-7.8 nothing major no gross bleeds-wife really worried about taking patient home today especially with what happened yesterday with blood transfusion wanting to wait for 1 more night-will check hemoglobin tomorrow a.m. if remains stable can consider discharge  Acute anemia: 2/2 hemorrhage.-Status post 6 units PRBC currently stable around 7.5-8 will continue to monitor closely and transfuse if less than 7-will need blood transfusion today 7/30-see above  Acute Thrombocytopenia: Initially suspect 2/2 dilutional.  Slowly continuing to downtrend.  Will continue to  34.2 32.2 - 35.5 gm/dl    RDW-CV 14.1 11.5 - 14.5 %    RDW-SD 46.5 (H) 35.0 - 45.0 fL    Platelets 57 (L) 130 - 400 thou/mm3    MPV 11.2 9.4 - 12.4 fL    Pathologist Review EKM     Differential Type see below     Seg Neutrophils 72.4 %    Lymphocytes 11.0 %    Monocytes % 13.0 %    Eosinophils 1.8 %    Basophils 0.4 %    Immature Granulocytes % 1.4 %    Platelet Estimate DECREASED Adequate    Neutrophils Absolute 3.6 1.8 - 7.7 thou/mm3    Lymphocytes Absolute 0.6 (L) 1.0 - 4.8 thou/mm3    Monocytes Absolute 0.7 0.4 - 1.3 thou/mm3    Eosinophils Absolute 0.1 0.0 - 0.4 thou/mm3    Basophils Absolute 0.0 0.0 - 0.1 thou/mm3    Immature Grans (Abs) 0.07 0.00 - 0.07 thou/mm3    nRBC 0 /100 wbc    BASOPHILIA 2+ Absent    Basophilic Stippling 1+ Absent   Anion Gap    Collection Time: 07/30/24  3:43 AM   Result Value Ref Range    Anion Gap 9.0 8.0 - 16.0 meq/L   Glomerular Filtration Rate, Estimated    Collection Time: 07/30/24  3:43 AM   Result Value Ref Range    Est, Glom Filt Rate > 90 >60 ml/min/1.73m2   Scan of Blood Smear    Collection Time: 07/30/24  3:43 AM   Result Value Ref Range    SCAN OF BLOOD SMEAR see below    TYPE AND SCREEN    Collection Time: 07/30/24  6:10 AM   Result Value Ref Range    ABO O     Rh Factor POS     Antibody Screen NEG    POCT glucose    Collection Time: 07/30/24  8:31 AM   Result Value Ref Range    POC Glucose 252 (H) 70 - 108 mg/dl   POCT glucose    Collection Time: 07/30/24 12:22 PM   Result Value Ref Range    POC Glucose 212 (H) 70 - 108 mg/dl   POCT glucose    Collection Time: 07/30/24  4:39 PM   Result Value Ref Range    POC Glucose 218 (H) 70 - 108 mg/dl   Hemoglobin and Hematocrit    Collection Time: 07/30/24  5:34 PM   Result Value Ref Range    Hemoglobin 8.1 (L) 14.0 - 18.0 gm/dl    Hematocrit 24.2 (L) 42.0 - 52.0 %   POCT glucose    Collection Time: 07/30/24  8:37 PM   Result Value Ref Range    POC Glucose 248 (H) 70 - 108 mg/dl   Hemoglobin and Hematocrit    Collection Time:

## 2024-07-31 NOTE — CARE COORDINATION
7/31/24, 10:39 AM EDT    Patient goals/plan/ treatment preferences discussed by  and .  Patient goals/plan/ treatment preferences reviewed with patient/ family.  Patient/ family verbalize understanding of discharge plan and are in agreement with goal/plan/treatment preferences.  Understanding was demonstrated using the teach back method.  AVS provided by RN at time of discharge, which includes all necessary medical information pertaining to the patients current course of illness, treatment, post-discharge goals of care, and treatment preferences.     Services At/After Discharge: None  plans home w spouse/POA Alicia when medically cleared ( H 7.8;  monitor) likely in am per Attending, [facility]

## 2024-07-31 NOTE — PLAN OF CARE
Problem: Discharge Planning  Goal: Discharge to home or other facility with appropriate resources  Outcome: Progressing     Problem: Pain  Goal: Verbalizes/displays adequate comfort level or baseline comfort level  Outcome: Progressing     Problem: Safety - Adult  Goal: Free from fall injury  Outcome: Progressing     Problem: Cardiovascular - Adult  Goal: Maintains optimal cardiac output and hemodynamic stability  Outcome: Progressing     Problem: Hematologic - Adult  Goal: Maintains hematologic stability  Outcome: Progressing     Problem: Chronic Conditions and Co-morbidities  Goal: Patient's chronic conditions and co-morbidity symptoms are monitored and maintained or improved  Outcome: Progressing     Problem: ABCDS Injury Assessment  Goal: Absence of physical injury  Outcome: Progressing     Problem: Musculoskeletal - Adult  Goal: Return ADL status to a safe level of function  Outcome: Progressing     Problem: Gastrointestinal - Adult  Goal: Maintains or returns to baseline bowel function  Outcome: Progressing     Problem: Genitourinary - Adult  Goal: Absence of urinary retention  Outcome: Progressing   Care plan reviewed with patient.  Patient verbalize understanding of the plan of care and contribute to goal setting.

## 2024-08-01 VITALS
HEART RATE: 75 BPM | WEIGHT: 286.16 LBS | SYSTOLIC BLOOD PRESSURE: 131 MMHG | BODY MASS INDEX: 40.97 KG/M2 | TEMPERATURE: 98 F | DIASTOLIC BLOOD PRESSURE: 69 MMHG | OXYGEN SATURATION: 97 % | HEIGHT: 70 IN | RESPIRATION RATE: 18 BRPM

## 2024-08-01 PROBLEM — D62 ACUTE BLOOD LOSS ANEMIA: Status: ACTIVE | Noted: 2024-08-01

## 2024-08-01 PROBLEM — E78.5 HYPERLIPIDEMIA: Status: ACTIVE | Noted: 2024-08-01

## 2024-08-01 PROBLEM — Z98.890 S/P ANGIOGRAM OF EXTREMITY: Status: ACTIVE | Noted: 2024-08-01

## 2024-08-01 PROBLEM — Z95.1 HX OF CABG: Status: ACTIVE | Noted: 2024-08-01

## 2024-08-01 PROBLEM — J44.9 CHRONIC OBSTRUCTIVE PULMONARY DISEASE (HCC): Status: ACTIVE | Noted: 2024-08-01

## 2024-08-01 PROBLEM — D69.6 THROMBOCYTOPENIA (HCC): Status: ACTIVE | Noted: 2024-08-01

## 2024-08-01 PROBLEM — I10 PRIMARY HYPERTENSION: Status: ACTIVE | Noted: 2024-08-01

## 2024-08-01 PROBLEM — Z95.2 HISTORY OF TRANSCATHETER AORTIC VALVE REPLACEMENT (TAVR): Status: ACTIVE | Noted: 2024-08-01

## 2024-08-01 PROBLEM — E11.9 NON-INSULIN DEPENDENT TYPE 2 DIABETES MELLITUS (HCC): Status: ACTIVE | Noted: 2024-08-01

## 2024-08-01 PROBLEM — I25.10 CORONARY ARTERY DISEASE INVOLVING NATIVE CORONARY ARTERY OF NATIVE HEART WITHOUT ANGINA PECTORIS: Status: ACTIVE | Noted: 2024-08-01

## 2024-08-01 PROBLEM — N20.0 NEPHROLITHIASIS: Status: ACTIVE | Noted: 2024-08-01

## 2024-08-01 LAB
ANION GAP SERPL CALC-SCNC: 13 MEQ/L (ref 8–16)
BASOPHILS ABSOLUTE: 0 THOU/MM3 (ref 0–0.1)
BASOPHILS NFR BLD AUTO: 0.4 %
BUN SERPL-MCNC: 11 MG/DL (ref 7–22)
CALCIUM SERPL-MCNC: 8.5 MG/DL (ref 8.5–10.5)
CHLORIDE SERPL-SCNC: 101 MEQ/L (ref 98–111)
CO2 SERPL-SCNC: 19 MEQ/L (ref 23–33)
CREAT SERPL-MCNC: 0.5 MG/DL (ref 0.4–1.2)
DEPRECATED RDW RBC AUTO: 49 FL (ref 35–45)
EOSINOPHIL NFR BLD AUTO: 1.5 %
EOSINOPHILS ABSOLUTE: 0.1 THOU/MM3 (ref 0–0.4)
ERYTHROCYTE [DISTWIDTH] IN BLOOD BY AUTOMATED COUNT: 14.3 % (ref 11.5–14.5)
GFR SERPL CREATININE-BSD FRML MDRD: > 90 ML/MIN/1.73M2
GLUCOSE BLD STRIP.AUTO-MCNC: 220 MG/DL (ref 70–108)
GLUCOSE BLD STRIP.AUTO-MCNC: 260 MG/DL (ref 70–108)
GLUCOSE SERPL-MCNC: 196 MG/DL (ref 70–108)
HCT VFR BLD AUTO: 23.1 % (ref 42–52)
HCT VFR BLD AUTO: 25 % (ref 42–52)
HGB BLD-MCNC: 7.6 GM/DL (ref 14–18)
HGB BLD-MCNC: 8 GM/DL (ref 14–18)
IMM GRANULOCYTES # BLD AUTO: 0.09 THOU/MM3 (ref 0–0.07)
IMM GRANULOCYTES NFR BLD AUTO: 1.7 %
LYMPHOCYTES ABSOLUTE: 0.6 THOU/MM3 (ref 1–4.8)
LYMPHOCYTES NFR BLD AUTO: 11 %
MCH RBC QN AUTO: 30.7 PG (ref 26–33)
MCHC RBC AUTO-ENTMCNC: 32 GM/DL (ref 32.2–35.5)
MCV RBC AUTO: 95.8 FL (ref 80–94)
MONOCYTES ABSOLUTE: 0.6 THOU/MM3 (ref 0.4–1.3)
MONOCYTES NFR BLD AUTO: 11 %
NEUTROPHILS ABSOLUTE: 4 THOU/MM3 (ref 1.8–7.7)
NEUTROPHILS NFR BLD AUTO: 74.4 %
NRBC BLD AUTO-RTO: 0 /100 WBC
PLATELET # BLD AUTO: 102 THOU/MM3 (ref 130–400)
PMV BLD AUTO: 11.2 FL (ref 9.4–12.4)
POTASSIUM SERPL-SCNC: 4.3 MEQ/L (ref 3.5–5.2)
RBC # BLD AUTO: 2.61 MILL/MM3 (ref 4.7–6.1)
SODIUM SERPL-SCNC: 133 MEQ/L (ref 135–145)
WBC # BLD AUTO: 5.4 THOU/MM3 (ref 4.8–10.8)

## 2024-08-01 PROCEDURE — 85014 HEMATOCRIT: CPT

## 2024-08-01 PROCEDURE — 6370000000 HC RX 637 (ALT 250 FOR IP)

## 2024-08-01 PROCEDURE — 85018 HEMOGLOBIN: CPT

## 2024-08-01 PROCEDURE — 80048 BASIC METABOLIC PNL TOTAL CA: CPT

## 2024-08-01 PROCEDURE — 36415 COLL VENOUS BLD VENIPUNCTURE: CPT

## 2024-08-01 PROCEDURE — 82948 REAGENT STRIP/BLOOD GLUCOSE: CPT

## 2024-08-01 PROCEDURE — 85025 COMPLETE CBC W/AUTO DIFF WBC: CPT

## 2024-08-01 PROCEDURE — 94640 AIRWAY INHALATION TREATMENT: CPT

## 2024-08-01 RX ORDER — BLOOD PRESSURE TEST KIT
1 KIT MISCELLANEOUS DAILY
Qty: 1 KIT | Refills: 0 | Status: SHIPPED | OUTPATIENT
Start: 2024-08-01

## 2024-08-01 RX ORDER — AMLODIPINE BESYLATE 10 MG/1
5 TABLET ORAL DAILY
Qty: 90 TABLET | Refills: 0
Start: 2024-08-01

## 2024-08-01 RX ADMIN — INSULIN LISPRO 3 UNITS: 100 INJECTION, SOLUTION INTRAVENOUS; SUBCUTANEOUS at 11:57

## 2024-08-01 RX ADMIN — INSULIN LISPRO 1 UNITS: 100 INJECTION, SOLUTION INTRAVENOUS; SUBCUTANEOUS at 09:39

## 2024-08-01 RX ADMIN — INSULIN LISPRO 3 UNITS: 100 INJECTION, SOLUTION INTRAVENOUS; SUBCUTANEOUS at 09:39

## 2024-08-01 RX ADMIN — ASPIRIN 81 MG: 81 TABLET, COATED ORAL at 08:04

## 2024-08-01 RX ADMIN — CLOPIDOGREL BISULFATE 75 MG: 75 TABLET ORAL at 08:04

## 2024-08-01 RX ADMIN — ATORVASTATIN CALCIUM 40 MG: 80 TABLET, FILM COATED ORAL at 08:05

## 2024-08-01 RX ADMIN — TIOTROPIUM BROMIDE AND OLODATEROL 2 PUFF: 3.124; 2.736 SPRAY, METERED RESPIRATORY (INHALATION) at 08:56

## 2024-08-01 RX ADMIN — INSULIN LISPRO 2 UNITS: 100 INJECTION, SOLUTION INTRAVENOUS; SUBCUTANEOUS at 11:58

## 2024-08-01 ASSESSMENT — PAIN SCALES - GENERAL
PAINLEVEL_OUTOF10: 0
PAINLEVEL_OUTOF10: 0

## 2024-08-01 NOTE — PLAN OF CARE
Problem: Discharge Planning  Goal: Discharge to home or other facility with appropriate resources  Outcome: Adequate for Discharge     Problem: Pain  Goal: Verbalizes/displays adequate comfort level or baseline comfort level  Outcome: Adequate for Discharge     Problem: Safety - Adult  Goal: Free from fall injury  Outcome: Adequate for Discharge     Problem: Cardiovascular - Adult  Goal: Maintains optimal cardiac output and hemodynamic stability  Outcome: Adequate for Discharge     Problem: Hematologic - Adult  Goal: Maintains hematologic stability  Outcome: Adequate for Discharge     Problem: Chronic Conditions and Co-morbidities  Goal: Patient's chronic conditions and co-morbidity symptoms are monitored and maintained or improved  Outcome: Adequate for Discharge     Problem: ABCDS Injury Assessment  Goal: Absence of physical injury  Outcome: Adequate for Discharge     Problem: Musculoskeletal - Adult  Goal: Return ADL status to a safe level of function  Outcome: Adequate for Discharge     Problem: Gastrointestinal - Adult  Goal: Maintains or returns to baseline bowel function  Outcome: Adequate for Discharge     Problem: Genitourinary - Adult  Goal: Absence of urinary retention  Outcome: Adequate for Discharge

## 2024-08-01 NOTE — FLOWSHEET NOTE
PT discharged home and verbalized understanding of written discharge instructions. PT VSS and denies CP, SOB, or discomfort. PT ambulated in room multiple times with no complaints of groin pain or increased swelling. PT waiting for transportation now. Will continue to monitor

## 2024-08-01 NOTE — PROGRESS NOTES
Cardiology Progress Note      Patient:  Jon Ji  YOB: 1950  MRN: [id number]   Acct: [id number]  Admit Date:  7/26/2024  Primary Cardiologist: Carolann  Seen by Dr. Aj     Per prior cardiology consult note-  Note per dr aj   \"CHIEF COMPLAINT:  Hemorrhagic Shock        HPI: This is a pleasant 74 y.o. male presents with hx of CABG x 5, COPD, DVT/PE who had S3U26 TAVR done at [facility]--main access site was RFA, and the pigtail site was 6Fr manual via the L SFA.  He was at home today, grilling, and had episodes of significant sneezing earlier when he noticed a bulge in the left groin.  It continued to get worse and the swelling started to cause pain, lightheadedness, and sob.  He became pale and diaphoretic.  He contacted his cardiologist, Dr. Amaro.  He called the EMS.  He asked EMS to take him to [facility], but EMS brought him to [facility] because he was sob.  Hgb 7 down from 13.  He was placed on Levophed.  After initial stabilization, we called [facility]-Dr. Amaro who insisted that we manage the patient at our hospital.  Patient had OM stent done 1 month prior.  He is not on OAC.  He is in 10/10 pain, wife at bedside.  He is responsive and awake.  He has no cardiac discomfort.  He notes bruising of the RFA as well.      ED attempted transfer but again [facility] felt that she was better served here, thus Femostop applied while attempting stabilization, but patient continued to decompensate and ultimately we elected to proceed with emergency intervention tonight.         Subjective (Events in last 24 hours):   Pt up in chair   Pleasant     Does co Lt upper inner thigh tenderness - no pain like when he came in     Noted bruising to scrotum and Lt inner leg extending don to knee  Lt leg swollen but soft - ACE wrap in place to entire LT leg     Tele SR no ectopy   BP stable     Pulses present to LT Leg pop - post tib and pedal - warm and neurovascular check WNL       7/30/2024  Pt up in chair   No chest pain or SOB  RVSP is 36 mmHg. No stenosis noted.   Pulmonic Valve The pulmonic valve visualization is suboptimal but appears to be functioning normally. Valve structure is normal. No regurgitation. No stenosis noted.   Aorta Normal sized aortic root. Ao ascending diameter is 3.6 cm.   IVC/Hepatic Veins IVC diameter is greater than 21 mm and decreases greater than 50% during inspiration; therefore the estimated right atrial pressure is intermediate (~8 mmHg). IVC size is normal.   Pericardium No pericardial effusion.         Stress:     Left Heart Cath:     Lab Data:    Cardiac Enzymes:  No results for input(s): \"CKTOTAL\", \"CKMB\", \"CKMBINDEX\", \"TROPONINI\" in the last 72 hours.      CBC:   Lab Results   Component Value Date/Time    WBC 5.0 07/30/2024 03:43 AM    RBC 2.07 07/30/2024 03:43 AM    HGB 7.6 08/01/2024 01:50 AM    HCT 23.1 08/01/2024 01:50 AM    PLT 57 07/30/2024 03:43 AM       CMP:    Lab Results   Component Value Date/Time     07/31/2024 04:31 AM    K 4.1 07/31/2024 04:31 AM     07/31/2024 04:31 AM    CO2 23 07/31/2024 04:31 AM    BUN 13 07/31/2024 04:31 AM    CREATININE 0.7 07/31/2024 04:31 AM    LABGLOM > 90 07/31/2024 04:31 AM    LABGLOM >60 02/09/2023 07:00 AM    GLUCOSE 189 07/31/2024 04:31 AM    GLUCOSE 171 04/13/2023 07:48 AM    CALCIUM 8.1 07/31/2024 04:31 AM       Hepatic Function Panel:    Lab Results   Component Value Date/Time    ALKPHOS 51 07/27/2024 07:45 AM    ALT 24 07/27/2024 07:45 AM    AST 63 07/27/2024 07:45 AM    BILITOT 1.1 07/27/2024 07:45 AM    BILIDIR 0.4 07/27/2024 07:45 AM       Magnesium:    Lab Results   Component Value Date/Time    MG 2.0 07/27/2024 07:45 AM       PT/INR:    Lab Results   Component Value Date/Time    INR 1.14 07/28/2024 11:15 AM       HgBA1c:    Lab Results   Component Value Date/Time    LABA1C 5.3 07/27/2024 01:55 AM       FLP:    Lab Results   Component Value Date/Time    TRIG 96 04/25/2022 12:00 AM    HDL 30 04/25/2022 12:00 AM       TSH:  No results found

## 2024-08-01 NOTE — DISCHARGE SUMMARY
Resident Discharge Summary (Hospitalist)      Patient: Jon Ji 74 y.o. male  : 1950  MRN: [id number]   Account: [id number]   Patient's PCP: Cailin Contreras APRN - CNP    Admit Date: 2024   Discharge Date: 2024      Admitting Physician: Ulisses Davis MD  Discharge Physician: Nick Hallman MD       Discharge Diagnoses:    Hemorrhagic shock-Resolved: 2/2 TAVR 6F pigtail site (L SFA) hemorrhage.  Significant extravasation into left thigh.  Baseline hemoglobin 13.  Hemoglobin on arrival 8.7, repeat was 7.0.  S/p 6 units of PRBC, 4 units of plasma, 1 unit platelets.  Case discussed with Dr. Navarrete, who had been notified by ED.  Emergently taken to interventional suite for percutaneous intervention.  S/p L SFA stent.  Monitor kidney function and urine output given hypotension and significant amount of contrast.    -Continue DAPT  -Hemoglobin has been stable  -Cardiology okay with discharge on   -Follow-up with cardiology    Acute anemia: 2/2 hemorrhage.-Status post 6 units PRBC currently stable around 7.5-8 will continue to monitor closely and transfuse if less than 7-will need blood transfusion today -see above    Acute Thrombocytopenia: Initially suspect 2/2 dilutional.  Slowly continuing to downtrend.  Will continue to monitor.  Patient currently taking ASA and Plavix.  CAD:  hx CABG and recent PCI. no indication of ACS.   -Continue DAPT  -Continue statin  NIDDM type 2: Last Hb a1c 5.3 2024. Home med includes metformin 1000 mg twice daily.    Resume home medications    HTN: Home meds include amlodipine 5 mg daily, losartan 50 mg daily. Hold until BP proves stable.   COPD: Not in acute exacerbation. Continue LABA/LAMA. PRN albuterol  HLD: Continue statin  Nonobstructing calculus in lower pole of right kidney.  HAGMA: resolved        Hospital Course:   \"This is a very pleasant 74-year-old male with PMH DVT/PE COPD, CAD s/p CABG and PCI, AI s/p TAVR, DM 2, HTN, HLD who

## 2024-08-03 ENCOUNTER — HOSPITAL ENCOUNTER (EMERGENCY)
Age: 74
Discharge: HOME | End: 2024-08-03
Attending: STUDENT IN AN ORGANIZED HEALTH CARE EDUCATION/TRAINING PROGRAM
Payer: OTHER GOVERNMENT

## 2024-08-03 ENCOUNTER — APPOINTMENT (OUTPATIENT)
Dept: GENERAL RADIOLOGY | Age: 74
End: 2024-08-03
Payer: OTHER GOVERNMENT

## 2024-08-03 ENCOUNTER — APPOINTMENT (OUTPATIENT)
Dept: CT IMAGING | Age: 74
End: 2024-08-03
Payer: OTHER GOVERNMENT

## 2024-08-03 VITALS
HEART RATE: 61 BPM | BODY MASS INDEX: 40.94 KG/M2 | DIASTOLIC BLOOD PRESSURE: 65 MMHG | OXYGEN SATURATION: 98 % | WEIGHT: 286 LBS | RESPIRATION RATE: 20 BRPM | SYSTOLIC BLOOD PRESSURE: 131 MMHG | TEMPERATURE: 98.1 F | HEIGHT: 70 IN

## 2024-08-03 DIAGNOSIS — G89.18: Primary | ICD-10-CM

## 2024-08-03 LAB
ALBUMIN SERPL BCG-MCNC: 3.8 G/DL (ref 3.5–5.1)
ALP SERPL-CCNC: 74 U/L (ref 38–126)
ALT SERPL W/O P-5'-P-CCNC: 21 U/L (ref 11–66)
ANION GAP SERPL CALC-SCNC: 10 MEQ/L (ref 8–16)
AST SERPL-CCNC: 29 U/L (ref 5–40)
BASOPHILS ABSOLUTE: 0 THOU/MM3 (ref 0–0.1)
BASOPHILS NFR BLD AUTO: 0.6 %
BILIRUB SERPL-MCNC: 3.6 MG/DL (ref 0.3–1.2)
BUN SERPL-MCNC: 12 MG/DL (ref 7–22)
CALCIUM SERPL-MCNC: 8.7 MG/DL (ref 8.5–10.5)
CHLORIDE SERPL-SCNC: 103 MEQ/L (ref 98–111)
CK SERPL-CCNC: 97 U/L (ref 55–170)
CO2 SERPL-SCNC: 21 MEQ/L (ref 23–33)
CREAT SERPL-MCNC: 0.6 MG/DL (ref 0.4–1.2)
DEPRECATED RDW RBC AUTO: 51.3 FL (ref 35–45)
EOSINOPHIL NFR BLD AUTO: 1.6 %
EOSINOPHILS ABSOLUTE: 0.1 THOU/MM3 (ref 0–0.4)
ERYTHROCYTE [DISTWIDTH] IN BLOOD BY AUTOMATED COUNT: 14.5 % (ref 11.5–14.5)
GFR SERPL CREATININE-BSD FRML MDRD: > 90 ML/MIN/1.73M2
GLUCOSE SERPL-MCNC: 234 MG/DL (ref 70–108)
HCT VFR BLD AUTO: 27.6 % (ref 42–52)
HGB BLD-MCNC: 8.5 GM/DL (ref 14–18)
IMM GRANULOCYTES # BLD AUTO: 0.09 THOU/MM3 (ref 0–0.07)
IMM GRANULOCYTES NFR BLD AUTO: 1.4 %
INR PPP: 1.14 (ref 0.85–1.13)
LACTIC ACID, SEPSIS: 1.8 MMOL/L (ref 0.5–1.9)
LYMPHOCYTES ABSOLUTE: 0.5 THOU/MM3 (ref 1–4.8)
LYMPHOCYTES NFR BLD AUTO: 8.8 %
MAGNESIUM SERPL-MCNC: 1.8 MG/DL (ref 1.6–2.4)
MCH RBC QN AUTO: 30.8 PG (ref 26–33)
MCHC RBC AUTO-ENTMCNC: 30.8 GM/DL (ref 32.2–35.5)
MCV RBC AUTO: 100 FL (ref 80–94)
MONOCYTES ABSOLUTE: 0.6 THOU/MM3 (ref 0.4–1.3)
MONOCYTES NFR BLD AUTO: 9.2 %
NEUTROPHILS ABSOLUTE: 4.9 THOU/MM3 (ref 1.8–7.7)
NEUTROPHILS NFR BLD AUTO: 78.4 %
NRBC BLD AUTO-RTO: 0 /100 WBC
OSMOLALITY SERPL CALC.SUM OF ELEC: 275.5 MOSMOL/KG (ref 275–300)
PLATELET # BLD AUTO: 149 THOU/MM3 (ref 130–400)
PMV BLD AUTO: 10.2 FL (ref 9.4–12.4)
POTASSIUM SERPL-SCNC: 4.2 MEQ/L (ref 3.5–5.2)
PROT SERPL-MCNC: 6.4 G/DL (ref 6.1–8)
RBC # BLD AUTO: 2.76 MILL/MM3 (ref 4.7–6.1)
REASON FOR REJECTION: NORMAL
REJECTED TEST: NORMAL
SODIUM SERPL-SCNC: 134 MEQ/L (ref 135–145)
TROPONIN, HIGH SENSITIVITY: 138 NG/L (ref 0–12)
TROPONIN, HIGH SENSITIVITY: 146 NG/L (ref 0–12)
WBC # BLD AUTO: 6.2 THOU/MM3 (ref 4.8–10.8)

## 2024-08-03 PROCEDURE — 6360000004 HC RX CONTRAST MEDICATION

## 2024-08-03 PROCEDURE — 99285 EMERGENCY DEPT VISIT HI MDM: CPT

## 2024-08-03 PROCEDURE — 71275 CT ANGIOGRAPHY CHEST: CPT

## 2024-08-03 PROCEDURE — 85025 COMPLETE CBC W/AUTO DIFF WBC: CPT

## 2024-08-03 PROCEDURE — 71046 X-RAY EXAM CHEST 2 VIEWS: CPT

## 2024-08-03 PROCEDURE — 83605 ASSAY OF LACTIC ACID: CPT

## 2024-08-03 PROCEDURE — 84484 ASSAY OF TROPONIN QUANT: CPT

## 2024-08-03 PROCEDURE — 75635 CT ANGIO ABDOMINAL ARTERIES: CPT

## 2024-08-03 PROCEDURE — 83735 ASSAY OF MAGNESIUM: CPT

## 2024-08-03 PROCEDURE — 36415 COLL VENOUS BLD VENIPUNCTURE: CPT

## 2024-08-03 PROCEDURE — 85610 PROTHROMBIN TIME: CPT

## 2024-08-03 PROCEDURE — 82550 ASSAY OF CK (CPK): CPT

## 2024-08-03 PROCEDURE — 80053 COMPREHEN METABOLIC PANEL: CPT

## 2024-08-03 PROCEDURE — 6360000004 HC RX CONTRAST MEDICATION: Performed by: STUDENT IN AN ORGANIZED HEALTH CARE EDUCATION/TRAINING PROGRAM

## 2024-08-03 RX ADMIN — IOPAMIDOL 100 ML: 755 INJECTION, SOLUTION INTRAVENOUS at 08:49

## 2024-08-03 RX ADMIN — IOPAMIDOL 80 ML: 755 INJECTION, SOLUTION INTRAVENOUS at 10:24

## 2024-08-03 ASSESSMENT — HEART SCORE: ECG: NORMAL

## 2024-08-03 ASSESSMENT — PAIN - FUNCTIONAL ASSESSMENT: PAIN_FUNCTIONAL_ASSESSMENT: NONE - DENIES PAIN

## 2024-08-03 NOTE — ED PROVIDER NOTES
software. It may contain   minor errors which are inherent in voice recognition technology.**      Electronically signed by Dr. Anita Cueto      XR CHEST (2 VW)   Final Result   Stable radiographic appearance of the chest. No evidence of an acute   process.               **This report has been created using voice recognition software. It may contain   minor errors which are inherent in voice recognition technology.**      Electronically signed by Dr. Anita Cueto        See ED course below for my interpretation if applicable.  All radiology images independently reviewed by me in the clinical context of this patient, in addition to interpretation provided by the radiologist.      EKG interpretation (none if blank):  normal sinus rhythm, rate 60, with premature ventricular complexes, QTc 420 ms   all EKG results are individually reviewed and interpreted by me in the clinical context of this patient.  All EKGs are also interpreted by our Cardiology department, final interpretation may not be available as of the writing of this note.      PREVIOUS RECORDS  AND EXTERNAL INFORMATION REVIEWED   History obtained from: the patient.  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  Repeat CT to check the patency of the pseudoaneurysm and the stent placement.  Evaluate patient for any complications that might be leading to the new induration    Comorbid conditions pertinent to this ED encounter:  History of TAVR and pseudoaneurysm repair      Differential Diagnosis includes but is not limited to:   leakage from his aneurysm  New hematoma formation  Stent failure  Blood clot  Pulmonary embolism    Decision

## 2024-08-03 NOTE — ED NOTES
Presents to ED with wife via private transportation with complaints of worsening left leg and scrotal swelling. Pt reports he was recently admitted due to the swelling and Dr. Navarrete put in a stent. He reports this was done 7 days ago. Pt reports he ws discharged on Thursday and was doing well. Pt admits he has noticed the swelling in his left leg became worse. He states he does not have pain at this time, but his leg feels \"very tight.\" Pt also reports shortness of breath that has been worsening since yesterday.

## 2024-08-04 LAB
CRYOGLOB SER QL: NORMAL
EKG ATRIAL RATE: 60 BPM
EKG P AXIS: 33 DEGREES
EKG P-R INTERVAL: 174 MS
EKG Q-T INTERVAL: 420 MS
EKG QRS DURATION: 98 MS
EKG QTC CALCULATION (BAZETT): 420 MS
EKG R AXIS: 2 DEGREES
EKG T AXIS: 26 DEGREES
EKG VENTRICULAR RATE: 60 BPM

## 2024-10-11 ENCOUNTER — TELEPHONE (OUTPATIENT)
Dept: CARDIOLOGY CLINIC | Age: 74
End: 2024-10-11

## 2024-10-11 RX ORDER — LOSARTAN POTASSIUM 50 MG/1
50 TABLET ORAL DAILY
COMMUNITY
End: 2024-10-23 | Stop reason: SDUPTHER

## 2024-10-12 NOTE — TELEPHONE ENCOUNTER
NAREN  Pt calling.  Dr Navarrete consulted in the hospital 7/31, pt has upcoming appt with Dr Navarrete on 10/23.  Pt is noticing elevation in his BP.  Previously was running 130/70s  Now, running mid to upper 140s/80s-90s.  He states that before his TAVR at [facility] his Losartan 50 mg was stopped and Amlodipine was decreased to 5 mg daily.  He restarted the Losartan 50 mg daily on his own this past Monday. Added back to [employer].  He will continue to monitor the BP, given he has only been on back on the Losartan a few days.  If he it continues to run higher, he will call back for further adjustment.     
Ok thanks 
Droplet+Contact precautions

## 2024-10-23 ENCOUNTER — OFFICE VISIT (OUTPATIENT)
Dept: CARDIOLOGY CLINIC | Age: 74
End: 2024-10-23
Payer: OTHER GOVERNMENT

## 2024-10-23 VITALS
HEIGHT: 70 IN | WEIGHT: 268.8 LBS | HEART RATE: 63 BPM | BODY MASS INDEX: 38.48 KG/M2 | DIASTOLIC BLOOD PRESSURE: 78 MMHG | SYSTOLIC BLOOD PRESSURE: 201 MMHG

## 2024-10-23 DIAGNOSIS — Z95.2 S/P TAVR (TRANSCATHETER AORTIC VALVE REPLACEMENT): Primary | ICD-10-CM

## 2024-10-23 PROCEDURE — 1123F ACP DISCUSS/DSCN MKR DOCD: CPT | Performed by: INTERNAL MEDICINE

## 2024-10-23 PROCEDURE — 3078F DIAST BP <80 MM HG: CPT | Performed by: INTERNAL MEDICINE

## 2024-10-23 PROCEDURE — 3077F SYST BP >= 140 MM HG: CPT | Performed by: INTERNAL MEDICINE

## 2024-10-23 PROCEDURE — 99204 OFFICE O/P NEW MOD 45 MIN: CPT | Performed by: INTERNAL MEDICINE

## 2024-10-23 RX ORDER — CLOPIDOGREL BISULFATE 75 MG/1
75 TABLET ORAL DAILY
Qty: 30 TABLET | Refills: 3 | Status: SHIPPED | OUTPATIENT
Start: 2024-10-23

## 2024-10-23 RX ORDER — HYDRALAZINE HYDROCHLORIDE 25 MG/1
25 TABLET, FILM COATED ORAL 3 TIMES DAILY
Qty: 90 TABLET | Refills: 3 | Status: SHIPPED | OUTPATIENT
Start: 2024-10-23

## 2024-10-23 RX ORDER — NITROGLYCERIN 0.4 MG/1
0.4 TABLET SUBLINGUAL EVERY 5 MIN PRN
Qty: 25 TABLET | Refills: 0 | Status: SHIPPED | OUTPATIENT
Start: 2024-10-23

## 2024-10-23 RX ORDER — HYDRALAZINE HYDROCHLORIDE 25 MG/1
25 TABLET, FILM COATED ORAL 3 TIMES DAILY
Qty: 90 TABLET | Refills: 3 | Status: CANCELLED | OUTPATIENT
Start: 2024-10-23

## 2024-10-23 RX ORDER — MAGNESIUM OXIDE 400 MG/1
400 TABLET ORAL 2 TIMES DAILY
Qty: 30 TABLET | Refills: 3 | Status: SHIPPED | OUTPATIENT
Start: 2024-10-23

## 2024-10-23 RX ORDER — AMLODIPINE BESYLATE 10 MG/1
5 TABLET ORAL DAILY
Qty: 90 TABLET | Refills: 3 | Status: SHIPPED | OUTPATIENT
Start: 2024-10-23

## 2024-10-23 RX ORDER — LOSARTAN POTASSIUM 50 MG/1
50 TABLET ORAL DAILY
Qty: 30 TABLET | Refills: 3 | Status: SHIPPED | OUTPATIENT
Start: 2024-10-23

## 2024-10-23 RX ORDER — ISOSORBIDE MONONITRATE 60 MG/1
60 TABLET, EXTENDED RELEASE ORAL DAILY
Qty: 30 TABLET | Refills: 3 | Status: CANCELLED | OUTPATIENT
Start: 2024-10-23

## 2024-10-23 RX ORDER — ATORVASTATIN CALCIUM 40 MG/1
40 TABLET, FILM COATED ORAL DAILY
Qty: 30 TABLET | Refills: 3 | Status: SHIPPED | OUTPATIENT
Start: 2024-10-23

## 2024-10-23 RX ORDER — ASPIRIN 81 MG/1
81 TABLET ORAL DAILY
Qty: 30 TABLET | Refills: 3 | Status: SHIPPED | OUTPATIENT
Start: 2024-10-23

## 2024-10-23 RX ORDER — ISOSORBIDE MONONITRATE 60 MG/1
60 TABLET, EXTENDED RELEASE ORAL DAILY
Qty: 30 TABLET | Refills: 3 | Status: SHIPPED | OUTPATIENT
Start: 2024-10-23

## 2024-10-23 NOTE — PROGRESS NOTES
[facility] [place] [facility]  [facility] CARDIOLOGY  [address]  [address]  [place] [address]  Dept: [phone]  Dept Fax: [phone]  Loc: [phone]    Visit Date: 10/23/2024    Mr. Ji is a 74 y.o. male  who presented for:  Chief Complaint   Patient presents with    Consultation     TAVR 07/2024       HPI:     History of Present Illness  The patient is a 74-year-old male who presents for evaluation of multiple medical concerns. He is accompanied by an adult female.    He has recently become a new patient and wishes for his medications to be managed. He is currently taking medication for cholesterol and requires refills for all his medications.    He has experienced two emergency room visits due to high fever, which were diagnosed as urinary tract infections. His primary care physician identified anemia due to blood loss and recommended a cardiology consultation. However, the cardiologist advised him to return to his primary care physician.    He has been experiencing high blood pressure, which he noticed during swimming when he had difficulty maintaining a steady breathing rhythm. His blood pressure readings have been in the 140s over 80s range, but he prefers them to be around 130 over 70s. He is concerned about the potential impact of his blood pressure on his kidney and liver health. He has noticed that his blood pressure affects his swimming and breathing. He has not undergone cardiac rehabilitation following his valve replacement procedure.  Despite these concerns, he maintains an active lifestyle, swimming three times a week and working out on the other days. He reports feeling well overall and did not require a blood transfusion post-surgery.    He has a stent in his leg, which is improving over time. He has been applying heat and massaging the area, which seems to help. He has noticed a difference in the condition of his leg compared to a week ago. He is currently

## 2024-10-23 NOTE — PATIENT INSTRUCTIONS
Your nurses today were Erin   Your provider today was Dr. Navarrete  Phone number: [phone]

## 2024-10-24 ENCOUNTER — TELEPHONE (OUTPATIENT)
Dept: CARDIOLOGY CLINIC | Age: 74
End: 2024-10-24

## 2024-10-24 DIAGNOSIS — Z95.2 S/P TAVR (TRANSCATHETER AORTIC VALVE REPLACEMENT): Primary | ICD-10-CM

## 2024-10-24 RX ORDER — HYDRALAZINE HYDROCHLORIDE 25 MG/1
25 TABLET, FILM COATED ORAL 3 TIMES DAILY
Qty: 90 TABLET | Refills: 3 | Status: SHIPPED | OUTPATIENT
Start: 2024-10-24

## 2024-10-24 RX ORDER — ISOSORBIDE MONONITRATE 30 MG/1
60 TABLET, EXTENDED RELEASE ORAL DAILY
Qty: 30 TABLET | Refills: 3 | Status: SHIPPED | OUTPATIENT
Start: 2024-10-24

## 2024-10-24 NOTE — TELEPHONE ENCOUNTER
OV note from 10/23/2024: An echocardiogram was ordered for 08/23/2024--but will be obtained from [facility] records.   Called [facility] for echo report.   Once received we need to send to Dr. Navarrete to review.   Seda will get 1 year testing and appt scheduled s/p tavr 7/22/2024 at [facility].

## 2024-10-24 NOTE — TELEPHONE ENCOUNTER
Orders received from Dr. Navarrete to schedule the patient for her 1 year post TAVR follow up appointment with an ECHO, CBC, and BMP.    Appointment is scheduled with Dr. Navarrete for 7/24/25 at 1000.  ECHO scheduled for 7/22/25 at 1000.    Dr. Navarrete, please agree.

## 2024-10-30 RX ORDER — ISOSORBIDE MONONITRATE 60 MG/1
60 TABLET, EXTENDED RELEASE ORAL 2 TIMES DAILY
Qty: 180 TABLET | Refills: 3 | Status: SHIPPED | OUTPATIENT
Start: 2024-10-30 | End: 2024-10-31 | Stop reason: SDUPTHER

## 2024-10-30 NOTE — TELEPHONE ENCOUNTER
Fax received from [facility] pharmacy-  Pt states they are supposed to be taking Imdur 60 mg BID, not 30 mg BID.   Please verify correct dosage.    Ph: [phone]  Fax: [phone]

## 2024-10-31 RX ORDER — ISOSORBIDE MONONITRATE 60 MG/1
60 TABLET, EXTENDED RELEASE ORAL 2 TIMES DAILY
Qty: 180 TABLET | Refills: 3 | Status: SHIPPED | OUTPATIENT
Start: 2024-10-31

## 2024-10-31 RX ORDER — LOSARTAN POTASSIUM 50 MG/1
50 TABLET ORAL DAILY
Qty: 90 TABLET | Refills: 3 | Status: SHIPPED | OUTPATIENT
Start: 2024-10-31

## 2024-10-31 RX ORDER — CLOPIDOGREL BISULFATE 75 MG/1
75 TABLET ORAL DAILY
Qty: 90 TABLET | Refills: 3 | Status: SHIPPED | OUTPATIENT
Start: 2024-10-31

## 2024-10-31 RX ORDER — HYDRALAZINE HYDROCHLORIDE 25 MG/1
25 TABLET, FILM COATED ORAL 3 TIMES DAILY
Qty: 270 TABLET | Refills: 3 | Status: SHIPPED | OUTPATIENT
Start: 2024-10-31

## 2024-10-31 RX ORDER — MAGNESIUM OXIDE 400 MG/1
400 TABLET ORAL 2 TIMES DAILY
Qty: 180 TABLET | Refills: 3 | Status: SHIPPED | OUTPATIENT
Start: 2024-10-31

## 2024-10-31 RX ORDER — ASPIRIN 81 MG/1
81 TABLET ORAL DAILY
Qty: 90 TABLET | Refills: 3 | Status: SHIPPED | OUTPATIENT
Start: 2024-10-31

## 2024-10-31 RX ORDER — ATORVASTATIN CALCIUM 40 MG/1
40 TABLET, FILM COATED ORAL DAILY
Qty: 90 TABLET | Refills: 3 | Status: SHIPPED | OUTPATIENT
Start: 2024-10-31

## 2024-10-31 NOTE — TELEPHONE ENCOUNTER
Patient notified  Verbalized understanding  Requesting 90 day prescriptions- pended  Patient was instructed to call pcp or GI for pantoprazole refill- medication on hold d/t low mag levels

## 2024-11-18 ENCOUNTER — TELEPHONE (OUTPATIENT)
Dept: CARDIOLOGY CLINIC | Age: 74
End: 2024-11-18

## 2024-11-18 DIAGNOSIS — Z95.2 S/P TAVR (TRANSCATHETER AORTIC VALVE REPLACEMENT): Primary | ICD-10-CM

## 2024-11-18 NOTE — TELEPHONE ENCOUNTER
Patient called with some concerns.   BP has been 129/74, 131/81, 122/74, 122/79, 122/79, 129/80, 115/73, 136/77 and 121/73-he takes these BP readings 3 hours after taking his meds.     These are his concerns:   He gets headaches that gets a little worse-he is on Imdur.   Protonix was previously discontinued due to low magnesium levels and patient was started on Pepcid.  He states he doesn't feel like the Pepcid is working as well.   Asking for labs to look at his liver, kidneys and magnesium at least ever 3-6 months.  [facility] but can be challenging at times to get lab results from them.   He is asking if he should be going to cardiac rehab?  If so, OK to place referral.

## 2024-11-19 NOTE — TELEPHONE ENCOUNTER
Received a call from [facility] stating patient called them with concerns that Dr. Navarrete isn't going to refill his cardiac medications and is turning everything over to Cailin Contreras CNP.   Per last note Dr. Navarrete states to stop the Imdur due to headaches, and to follow-up with PCP regarding Protonix and Labs.   Pt has an appt in April with Dr. Navarrete so we can refill cardiac meds.   Meds were refilled on 10/23/2024 and 10/31/2024 with a 1 year supply.     LM for patient to call back to explain all of this to the patient and address any questions or concerns he may have.

## 2024-11-20 NOTE — TELEPHONE ENCOUNTER
Pt called back and I talked to this patient at length.   I advised him to [facility] as [facility] every 3-4 months and if he feels better sending those results to cardiology he can do that through [facility].   Discussed his PPI.  So he is really wanting cardiology to address this.  He states his magnesium level runs on the lower side and previous cardiologist Dr. Menchaca at [facility] discontinued Protonix and started him on Pepcid.   He states Pepcid isn't helping and he is having to take Rolaids.     I discussed with Dr. Navarrete over the phone.  Verbal Order from Dr. Navarrete-Can prescribe Carafate 1gram 4 times a day and refer to GI.   I called patient back and he wants to hold off with carafate for now and will see his GI doctor as planned on 12/20/2024.    I also advised patient to monitor BP and call us back if bp is running high.  Imdur was stopped due to headaches.

## 2024-12-11 ENCOUNTER — LAB (OUTPATIENT)
Dept: LAB | Age: 74
End: 2024-12-11

## 2024-12-11 LAB
ALBUMIN SERPL BCG-MCNC: 4.2 G/DL (ref 3.5–5.1)
ALP SERPL-CCNC: 81 U/L (ref 38–126)
ALT SERPL W/O P-5'-P-CCNC: 22 U/L (ref 11–66)
AST SERPL-CCNC: 28 U/L (ref 5–40)
BILIRUB CONJ SERPL-MCNC: 0.6 MG/DL (ref 0.1–13.8)
BILIRUB SERPL-MCNC: 1.7 MG/DL (ref 0.3–1.2)
HCT VFR BLD AUTO: 35.1 % (ref 42–52)
HGB BLD-MCNC: 12.4 GM/DL (ref 14–18)
MAGNESIUM SERPL-MCNC: 1.6 MG/DL (ref 1.6–2.4)
PROT SERPL-MCNC: 7.5 G/DL (ref 6.1–8)

## 2024-12-12 ENCOUNTER — HOSPITAL ENCOUNTER (OUTPATIENT)
Dept: CARDIAC REHAB | Age: 74
Setting detail: THERAPIES SERIES
Discharge: HOME | End: 2024-12-12
Payer: OTHER GOVERNMENT

## 2024-12-12 VITALS — WEIGHT: 267.8 LBS | HEIGHT: 70 IN | BODY MASS INDEX: 38.34 KG/M2

## 2024-12-12 PROCEDURE — 93798 PHYS/QHP OP CAR RHAB W/ECG: CPT

## 2024-12-12 ASSESSMENT — PATIENT HEALTH QUESTIONNAIRE - PHQ9
4. FEELING TIRED OR HAVING LITTLE ENERGY: NOT AT ALL
SUM OF ALL RESPONSES TO PHQ QUESTIONS 1-9: 0
3. TROUBLE FALLING OR STAYING ASLEEP: NOT AT ALL
7. TROUBLE CONCENTRATING ON THINGS, SUCH AS READING THE NEWSPAPER OR WATCHING TELEVISION: NOT AT ALL
SUM OF ALL RESPONSES TO PHQ QUESTIONS 1-9: 0
SUM OF ALL RESPONSES TO PHQ9 QUESTIONS 1 & 2: 0
SUM OF ALL RESPONSES TO PHQ QUESTIONS 1-9: 0
SUM OF ALL RESPONSES TO PHQ QUESTIONS 1-9: 0
10. IF YOU CHECKED OFF ANY PROBLEMS, HOW DIFFICULT HAVE THESE PROBLEMS MADE IT FOR YOU TO DO YOUR WORK, TAKE CARE OF THINGS AT HOME, OR GET ALONG WITH OTHER PEOPLE: NOT DIFFICULT AT ALL
1. LITTLE INTEREST OR PLEASURE IN DOING THINGS: NOT AT ALL
9. THOUGHTS THAT YOU WOULD BE BETTER OFF DEAD, OR OF HURTING YOURSELF: NOT AT ALL
8. MOVING OR SPEAKING SO SLOWLY THAT OTHER PEOPLE COULD HAVE NOTICED. OR THE OPPOSITE, BEING SO FIGETY OR RESTLESS THAT YOU HAVE BEEN MOVING AROUND A LOT MORE THAN USUAL: NOT AT ALL
6. FEELING BAD ABOUT YOURSELF - OR THAT YOU ARE A FAILURE OR HAVE LET YOURSELF OR YOUR FAMILY DOWN: NOT AT ALL
2. FEELING DOWN, DEPRESSED OR HOPELESS: NOT AT ALL
5. POOR APPETITE OR OVEREATING: NOT AT ALL

## 2024-12-12 NOTE — PLAN OF CARE
Contemplation  [x] Contemplation  [] Preparation  [] Action  [] Maintenance  [] Relapse [] Pre Contemplation  [] Contemplation  [] Preparation  [] Action  [] Maintenance  [] Relapse [] Pre Contemplation  [] Contemplation  [] Preparation  [] Action  [] Maintenance  [] Relapse [] Pre Contemplation  [] Contemplation  [] Preparation  [] Action  [] Maintenance  [] Relapse [] Pre Contemplation  [] Contemplation  [] Preparation  [] Action  [] Maintenance  [] Relapse [] Pre Contemplation  [] Contemplation  [] Preparation  [] Action  [] Maintenance  [] Relapse   Hypertension Hypertension Hypertension Hypertension Hypertension Hypertension   [x] Yes      [] No    Resting BP: 138/80  Peak Ex BP:138/60  Medication: hydralazine, losartan, norvasc   Resting BP:   Peak Ex BP:  Medication Changes:  [] Yes      [] No    Additional Notes: Resting BP:   Peak Ex BP:  Medication Changes:  [] Yes      [] No    Additional Notes: Resting BP:   Peak Ex BP:  Medication Changes:  [] Yes      [] No    Additional Notes: Resting BP:   Peak Ex BP:  Medication Changes:  [] Yes      [] No    Additional Notes: Resting BP:   Peak Ex BP:  Medication Changes:  [] Yes      [] No    Additional Notes:   Lipid Management Lipid Management Lipid Management Lipid Management Lipid Management Lipid Management   HLD/DLD  [x] Yes (No rewcent test results in system- 22)      [] No  TOTAL CHOL: 108  HDL:  30  LDL:  58  TRI  Medication: lipitor   Medication Changes:  [] Yes      [] No    Additional Notes:       Medication Changes:  [] Yes      [] No    Additional Notes:   Medication Changes:  [] Yes      [] No    Additional Notes:   Medication Changes:  [] Yes      [] No    Additional Notes:   Medication Changes:  [] Yes      [] No    Additional Notes:   Weight Management Weight Management Weight Management Weight Management Weight Management Weight Management   Weight: 267.8        Height: 5'10\"   BMI: 38.5     Goal:   [x] Lose weight  [] Maintain

## 2024-12-19 ENCOUNTER — HOSPITAL ENCOUNTER (OUTPATIENT)
Dept: CARDIAC REHAB | Age: 74
Setting detail: THERAPIES SERIES
Discharge: HOME | End: 2024-12-19
Payer: OTHER GOVERNMENT

## 2024-12-19 PROCEDURE — 93798 PHYS/QHP OP CAR RHAB W/ECG: CPT

## 2024-12-20 NOTE — TELEPHONE ENCOUNTER
Jon Ji \"Narayan\"  P [facility] Clinical Staff (supporting Gomez Navarrete MD)Just now (10:37 AM)     RC  I'm sending you some blood test [facility] that [facility]. Also my blood pressure readings for the last 10 days.     Thanks,     Jon Ji  1950

## 2024-12-23 RX ORDER — AMLODIPINE BESYLATE 2.5 MG/1
7.5 TABLET ORAL DAILY
Qty: 270 TABLET | Refills: 2 | Status: SHIPPED | OUTPATIENT
Start: 2024-12-23

## 2024-12-23 NOTE — TELEPHONE ENCOUNTER
Jon Ji \"Narayan\"  P [facility] Clinical Staff (supporting Gomez Navarrete MD)32 minutes ago (9:25 AM)     RC  Please send script to [facility]. Duyen Grey and thanks again, Narayan Ji

## 2024-12-24 ENCOUNTER — HOSPITAL ENCOUNTER (OUTPATIENT)
Dept: CARDIAC REHAB | Age: 74
Setting detail: THERAPIES SERIES
Discharge: HOME | End: 2024-12-24
Payer: OTHER GOVERNMENT

## 2024-12-24 PROCEDURE — 93798 PHYS/QHP OP CAR RHAB W/ECG: CPT

## 2024-12-26 ENCOUNTER — HOSPITAL ENCOUNTER (OUTPATIENT)
Dept: CARDIAC REHAB | Age: 74
Setting detail: THERAPIES SERIES
End: 2024-12-26
Payer: OTHER GOVERNMENT

## 2024-12-31 ENCOUNTER — HOSPITAL ENCOUNTER (OUTPATIENT)
Dept: CARDIAC REHAB | Age: 74
Setting detail: THERAPIES SERIES
Discharge: HOME | End: 2024-12-31
Payer: OTHER GOVERNMENT

## 2024-12-31 PROCEDURE — 93798 PHYS/QHP OP CAR RHAB W/ECG: CPT

## 2025-01-02 ENCOUNTER — HOSPITAL ENCOUNTER (OUTPATIENT)
Dept: CARDIAC REHAB | Age: 75
Setting detail: THERAPIES SERIES
Discharge: HOME | End: 2025-01-02
Payer: OTHER GOVERNMENT

## 2025-01-02 PROCEDURE — 93798 PHYS/QHP OP CAR RHAB W/ECG: CPT

## 2025-01-07 ENCOUNTER — APPOINTMENT (OUTPATIENT)
Dept: CARDIAC REHAB | Age: 75
End: 2025-01-07
Payer: OTHER GOVERNMENT

## 2025-01-09 ENCOUNTER — APPOINTMENT (OUTPATIENT)
Dept: CARDIAC REHAB | Age: 75
End: 2025-01-09
Payer: OTHER GOVERNMENT

## 2025-01-14 ENCOUNTER — APPOINTMENT (OUTPATIENT)
Dept: CARDIAC REHAB | Age: 75
End: 2025-01-14
Payer: OTHER GOVERNMENT

## 2025-01-16 ENCOUNTER — HOSPITAL ENCOUNTER (OUTPATIENT)
Dept: CARDIAC REHAB | Age: 75
Setting detail: THERAPIES SERIES
Discharge: HOME | End: 2025-01-16
Payer: OTHER GOVERNMENT

## 2025-01-16 PROCEDURE — 93798 PHYS/QHP OP CAR RHAB W/ECG: CPT

## 2025-01-21 ENCOUNTER — APPOINTMENT (OUTPATIENT)
Dept: CARDIAC REHAB | Age: 75
End: 2025-01-21
Payer: OTHER GOVERNMENT

## 2025-01-23 ENCOUNTER — HOSPITAL ENCOUNTER (OUTPATIENT)
Dept: CARDIAC REHAB | Age: 75
Setting detail: THERAPIES SERIES
Discharge: HOME | End: 2025-01-23
Payer: OTHER GOVERNMENT

## 2025-01-23 PROCEDURE — 93798 PHYS/QHP OP CAR RHAB W/ECG: CPT

## 2025-01-28 ENCOUNTER — APPOINTMENT (OUTPATIENT)
Dept: CARDIAC REHAB | Age: 75
End: 2025-01-28
Payer: OTHER GOVERNMENT

## 2025-01-30 ENCOUNTER — HOSPITAL ENCOUNTER (OUTPATIENT)
Dept: CARDIAC REHAB | Age: 75
Setting detail: THERAPIES SERIES
Discharge: HOME | End: 2025-01-30
Payer: OTHER GOVERNMENT

## 2025-01-30 PROCEDURE — 93798 PHYS/QHP OP CAR RHAB W/ECG: CPT

## 2025-02-04 ENCOUNTER — HOSPITAL ENCOUNTER (OUTPATIENT)
Dept: CARDIAC REHAB | Age: 75
Setting detail: THERAPIES SERIES
Discharge: HOME | End: 2025-02-04
Payer: OTHER GOVERNMENT

## 2025-02-04 PROCEDURE — 93798 PHYS/QHP OP CAR RHAB W/ECG: CPT

## 2025-02-04 NOTE — PLAN OF CARE
documented arrhythmia at increasing workloads  [] associated symptoms  Monitored telemetry has revealed     [] documented arrhythmia at increasing workloads  [] associated symptoms  Monitored telemetry has revealed     [] documented arrhythmia at increasing workloads  [] associated symptoms    Physician Response    [x] Cardiac rehab is reasonably and medically necessary for continuous cardiac monitoring surveillance  of patient's cardiac activity  [x] Initiate continuous telemetry monitoring and notify me with any concerns  [] Other   Physician Response    [x] Cardiac rehab is reasonably and medically necessary for continuous cardiac monitoring surveillance  of patient's cardiac activity  [x] Continue continuous telemetry monitoring and notify me with any concerns  [] Other     Physician Response    [x] Cardiac rehab is reasonably and medically necessary for continuous cardiac monitoring surveillance  of patient's cardiac activity  [x] Continue continuous telemetry monitoring and notify me with any concerns   [] Other     Physician Response    [x] Cardiac rehab is reasonably and medically necessary for continuous cardiac monitoring surveillance  of patient's cardiac activity  [x] Continue continuous telemetry monitoring and notify me with any concerns   [] Other     Physician Response    [x] Cardiac rehab is reasonably and medically necessary for continuous cardiac monitoring surveillance  of patient's cardiac activity  [x] Continue continuous telemetry monitoring and notify me with any concerns   [] Other      Date/Time User Provider Type Action   12/12/2024  2:16 PM Mc Allred MD Physician Cosign   12/12/2024  2:15 PM Kadie Meneses, EP Exercise Physiologist Sign     Cosigned by: Mc Allred MD at 1/9/2025  4:48 PM     Revision History    Date/Time User Provider Type Action   1/9/2025  4:48 PM Mc Allred MD Physician Cosign   1/9/2025  8:11 AM Afshin Simms EP Exercise Physiologist

## 2025-02-06 ENCOUNTER — HOSPITAL ENCOUNTER (OUTPATIENT)
Dept: CARDIAC REHAB | Age: 75
Setting detail: THERAPIES SERIES
Discharge: HOME | End: 2025-02-06
Payer: OTHER GOVERNMENT

## 2025-02-06 PROCEDURE — 93798 PHYS/QHP OP CAR RHAB W/ECG: CPT

## 2025-02-10 ENCOUNTER — TELEPHONE (OUTPATIENT)
Dept: PULMONOLOGY | Age: 75
End: 2025-02-10

## 2025-02-10 NOTE — TELEPHONE ENCOUNTER
Patient called in to move 1 yr copd f/u up sooner since he will be out of town. [facility] starts 3.25- appt moved up to 3.3.25 with Isabel. Just a heads up for updated auth ! Thank you

## 2025-02-11 ENCOUNTER — HOSPITAL ENCOUNTER (OUTPATIENT)
Dept: CARDIAC REHAB | Age: 75
Setting detail: THERAPIES SERIES
Discharge: HOME | End: 2025-02-11
Payer: OTHER GOVERNMENT

## 2025-02-11 PROCEDURE — 93798 PHYS/QHP OP CAR RHAB W/ECG: CPT

## 2025-02-13 ENCOUNTER — HOSPITAL ENCOUNTER (OUTPATIENT)
Dept: CARDIAC REHAB | Age: 75
Setting detail: THERAPIES SERIES
End: 2025-02-13
Payer: OTHER GOVERNMENT

## 2025-02-18 ENCOUNTER — HOSPITAL ENCOUNTER (OUTPATIENT)
Dept: CARDIAC REHAB | Age: 75
Setting detail: THERAPIES SERIES
Discharge: HOME | End: 2025-02-18
Payer: OTHER GOVERNMENT

## 2025-02-18 PROCEDURE — 93798 PHYS/QHP OP CAR RHAB W/ECG: CPT

## 2025-02-20 ENCOUNTER — HOSPITAL ENCOUNTER (OUTPATIENT)
Dept: CARDIAC REHAB | Age: 75
Setting detail: THERAPIES SERIES
Discharge: HOME | End: 2025-02-20
Payer: OTHER GOVERNMENT

## 2025-02-20 PROCEDURE — 93798 PHYS/QHP OP CAR RHAB W/ECG: CPT

## 2025-02-25 ENCOUNTER — HOSPITAL ENCOUNTER (OUTPATIENT)
Dept: CARDIAC REHAB | Age: 75
Setting detail: THERAPIES SERIES
Discharge: HOME | End: 2025-02-25
Payer: OTHER GOVERNMENT

## 2025-02-25 PROCEDURE — 93798 PHYS/QHP OP CAR RHAB W/ECG: CPT

## 2025-02-27 ENCOUNTER — HOSPITAL ENCOUNTER (OUTPATIENT)
Dept: CARDIAC REHAB | Age: 75
Setting detail: THERAPIES SERIES
Discharge: HOME | End: 2025-02-27
Payer: OTHER GOVERNMENT

## 2025-02-27 PROCEDURE — 93798 PHYS/QHP OP CAR RHAB W/ECG: CPT

## 2025-03-04 ENCOUNTER — OFFICE VISIT (OUTPATIENT)
Dept: PULMONOLOGY | Age: 75
End: 2025-03-04
Payer: OTHER GOVERNMENT

## 2025-03-04 ENCOUNTER — HOSPITAL ENCOUNTER (OUTPATIENT)
Dept: CARDIAC REHAB | Age: 75
Setting detail: THERAPIES SERIES
Discharge: HOME | End: 2025-03-04
Payer: OTHER GOVERNMENT

## 2025-03-04 VITALS
HEIGHT: 70 IN | DIASTOLIC BLOOD PRESSURE: 70 MMHG | HEART RATE: 61 BPM | WEIGHT: 275.6 LBS | TEMPERATURE: 98.2 F | OXYGEN SATURATION: 96 % | BODY MASS INDEX: 39.46 KG/M2 | SYSTOLIC BLOOD PRESSURE: 126 MMHG

## 2025-03-04 DIAGNOSIS — Z87.891 PERSONAL HISTORY OF SMOKING: ICD-10-CM

## 2025-03-04 DIAGNOSIS — E66.9 OBESITY (BMI 30-39.9): ICD-10-CM

## 2025-03-04 DIAGNOSIS — J44.9 STAGE 2 MODERATE COPD BY GOLD CLASSIFICATION (HCC): Primary | ICD-10-CM

## 2025-03-04 DIAGNOSIS — I10 PRIMARY HYPERTENSION: ICD-10-CM

## 2025-03-04 DIAGNOSIS — Z95.2 HISTORY OF TRANSCATHETER AORTIC VALVE REPLACEMENT (TAVR): ICD-10-CM

## 2025-03-04 PROCEDURE — 93798 PHYS/QHP OP CAR RHAB W/ECG: CPT

## 2025-03-04 PROCEDURE — 3078F DIAST BP <80 MM HG: CPT

## 2025-03-04 PROCEDURE — 3074F SYST BP LT 130 MM HG: CPT

## 2025-03-04 PROCEDURE — 1123F ACP DISCUSS/DSCN MKR DOCD: CPT

## 2025-03-04 PROCEDURE — 99214 OFFICE O/P EST MOD 30 MIN: CPT

## 2025-03-04 RX ORDER — ALBUTEROL SULFATE 90 UG/1
2 INHALANT RESPIRATORY (INHALATION) EVERY 6 HOURS PRN
Qty: 3 EACH | Refills: 3 | Status: SHIPPED | OUTPATIENT
Start: 2025-03-04

## 2025-03-04 ASSESSMENT — ENCOUNTER SYMPTOMS
CONSTIPATION: 0
VOMITING: 0
CHEST TIGHTNESS: 0
SINUS PRESSURE: 0
DIARRHEA: 0
WHEEZING: 0
COUGH: 0
VOICE CHANGE: 0
SHORTNESS OF BREATH: 1
APNEA: 0
NAUSEA: 0
COLOR CHANGE: 0
CHOKING: 0

## 2025-03-04 NOTE — PATIENT INSTRUCTIONS
-Start Stiolto 2 puffs into the lung daily   -Stop Spiriva Respimat   -Continue albuterol sulfate HFA (VENTOLIN HFA) 108 (90 Base) MCG/ACT inhaler as needed every 6 hours for shortness of breath or wheezing.   -Schedule PFTs prior to next visit in 6 months

## 2025-03-04 NOTE — PROGRESS NOTES
[facility]    Patient: BRADLEY BECERRA, 74 y.o.   : 1950  3/4/2025    Pt of Dr. Blackwood      Subjective   No chief complaint on file.       HPI  Bradley is here for follow up for COPD 1 year f/u.      Overall patient reports respiratory symptoms have been increasing steadily since last appointment. Patient reports good compliance with inhaled medications (spiriva). Patient using albuterol prior to exercise with good results. Patient reports minimal physical limitation due to respiratory symptoms.   Patient is a very active 74-year-old male, he swims 3 days a week, does water aerobics 2 days a week followed by stretching and elliptical.      Patient had a hospitalization 2024 with hemorrhagic shock following a TAVR that was treated with a left SFA stent.  Patient has been working to get back to his baseline prior to this event.  His hypertension is better controlled now, he feels like he is 85 to 90% back to his baseline prior to this event.  He still has complaints of shortness of breath when he starts swimming, this normally resolves when he has swam a lap or 2.   Pertinent negatives: Cough, Sputum Production, Hemoptysis, Wheezing, and Chest Tightness    Pulmonary history since last visit NA     Pulmonary medications and how they are currently being taken by patient  Albuterol couple times a week    Spiriva Respimat daily      Smoking history  Remote smoking hx quit in     Progress History:   Since last visit any new medical issues? Yes HTN, undergoing cardiac rehab, anemia   New ER or hospital visits? Yes hemorrhagic shock 2/2 TAVR with significant L SFA hemorrhage requiring MTP, vasopressors, ICU stay, s/p L SFA stent   Any new or changes in medicines? Yes HTN meds  Using inhalers? Yes   Are they helpful? Yes   Flu vaccine unable to see in chart  Pneumonia vaccine unable to see in chart    Last PFT:  PFTs: moderate copd, FEV1 62%, FVC 70%, mildly reduced

## 2025-03-06 ENCOUNTER — HOSPITAL ENCOUNTER (OUTPATIENT)
Dept: CARDIAC REHAB | Age: 75
Setting detail: THERAPIES SERIES
Discharge: HOME | End: 2025-03-06
Payer: OTHER GOVERNMENT

## 2025-03-06 PROCEDURE — G0422 INTENS CARDIAC REHAB W/EXERC: HCPCS

## 2025-03-06 PROCEDURE — 93798 PHYS/QHP OP CAR RHAB W/ECG: CPT

## 2025-03-11 ENCOUNTER — HOSPITAL ENCOUNTER (OUTPATIENT)
Dept: CARDIAC REHAB | Age: 75
Setting detail: THERAPIES SERIES
Discharge: HOME | End: 2025-03-11
Payer: OTHER GOVERNMENT

## 2025-03-11 PROCEDURE — 93798 PHYS/QHP OP CAR RHAB W/ECG: CPT

## 2025-03-13 ENCOUNTER — HOSPITAL ENCOUNTER (OUTPATIENT)
Dept: CARDIAC REHAB | Age: 75
Setting detail: THERAPIES SERIES
Discharge: HOME | End: 2025-03-13
Payer: OTHER GOVERNMENT

## 2025-03-13 PROCEDURE — 93798 PHYS/QHP OP CAR RHAB W/ECG: CPT

## 2025-03-18 ENCOUNTER — APPOINTMENT (OUTPATIENT)
Dept: CARDIAC REHAB | Age: 75
End: 2025-03-18
Payer: OTHER GOVERNMENT

## 2025-03-20 ENCOUNTER — APPOINTMENT (OUTPATIENT)
Dept: CARDIAC REHAB | Age: 75
End: 2025-03-20
Payer: OTHER GOVERNMENT

## 2025-03-25 ENCOUNTER — APPOINTMENT (OUTPATIENT)
Dept: CARDIAC REHAB | Age: 75
End: 2025-03-25
Payer: OTHER GOVERNMENT

## 2025-03-27 ENCOUNTER — APPOINTMENT (OUTPATIENT)
Dept: CARDIAC REHAB | Age: 75
End: 2025-03-27
Payer: OTHER GOVERNMENT

## 2025-04-01 ENCOUNTER — HOSPITAL ENCOUNTER (OUTPATIENT)
Dept: CARDIAC REHAB | Age: 75
Setting detail: THERAPIES SERIES
Discharge: HOME | End: 2025-04-01

## 2025-04-01 NOTE — PLAN OF CARE
Met Goal Status   [] Initial   [x] In Progress   [] Met Goal Status   [] Initial   [] In Progress   [] Met   Exercise goals:  Jon exercise goal is to attend exercise & education sessions 2 x/wk, and continue home aerobic exercise.  Jon would also like to  maintain his current level of activity. Pt feels like he is able to do everything he needs/wants to do. Progress towards goals:  Jon  has continued home exercise swimming and using the eliptical 3-4x/week      Progress towards  goals:  Jon  working towards goals.     Progress towards goals:  Jon is progressing by continuing his home exercise.     Progress towards goals:  Jon continues to progress     Progress/achievement of  goals:  Jon   [] achieved set goals  [] Has not achieved set goal  **     Individual Cardiac Treatment Plan - Nutrition  NUTRITION  ASSESSMENT/PLAN NUTRITION  REASSESSMENT NUTRITION   REASSESSMENT NUTRITION   REASSESSMENT NUTRITION  DISCHARGE/FOLLOW-UP NUTRITION  DISCHARGE/FOLLOW-UP   Stages of Change Stages of Change Stages of Change Stages of Change Stages of Change Stages of Change   [x] Pre Contemplation  [] Contemplation  [] Preparation  [] Action  [] Maintenance  [] Relapse [x] Pre Contemplation  [] Contemplation  [] Preparation  [] Action  [] Maintenance  [] Relapse [] Pre Contemplation  [x] Contemplation  [] Preparation  [] Action  [] Maintenance  [] Relapse [x] Pre Contemplation  [] Contemplation  [] Preparation  [] Action  [] Maintenance  [] Relapse [x] Pre Contemplation  [] Contemplation  [] Preparation  [] Action  [] Maintenance  [] Relapse [] Pre Contemplation  [] Contemplation  [] Preparation  [] Action  [] Maintenance  [] Relapse   NUTRITION ASSESSMENT NUTRITION ASSESSMENT NUTRITION ASSESSMENT NUTRITION ASSESSMENT NUTRITION ASSESSMENT NUTRITION ASSESSMENT   Eating Plan  Current diet: none Eating Plan  Diet changes made: unable to assess Pt not present  Eating Plan  Diet changes made:  none Eating Plan  Diet

## 2025-04-03 ENCOUNTER — APPOINTMENT (OUTPATIENT)
Dept: CARDIAC REHAB | Age: 75
End: 2025-04-03
Payer: OTHER GOVERNMENT

## 2025-04-08 ENCOUNTER — HOSPITAL ENCOUNTER (OUTPATIENT)
Dept: CARDIAC REHAB | Age: 75
Setting detail: THERAPIES SERIES
Discharge: HOME | End: 2025-04-08
Payer: OTHER GOVERNMENT

## 2025-04-08 PROCEDURE — 93798 PHYS/QHP OP CAR RHAB W/ECG: CPT

## 2025-04-10 ENCOUNTER — HOSPITAL ENCOUNTER (OUTPATIENT)
Dept: CARDIAC REHAB | Age: 75
Setting detail: THERAPIES SERIES
Discharge: HOME | End: 2025-04-10
Payer: OTHER GOVERNMENT

## 2025-04-10 PROCEDURE — 93798 PHYS/QHP OP CAR RHAB W/ECG: CPT

## 2025-04-15 ENCOUNTER — HOSPITAL ENCOUNTER (OUTPATIENT)
Dept: CARDIAC REHAB | Age: 75
Setting detail: THERAPIES SERIES
Discharge: HOME | End: 2025-04-15
Payer: OTHER GOVERNMENT

## 2025-04-15 ENCOUNTER — TELEPHONE (OUTPATIENT)
Dept: PULMONOLOGY | Age: 75
End: 2025-04-15

## 2025-04-15 DIAGNOSIS — Z87.891 PERSONAL HISTORY OF SMOKING: ICD-10-CM

## 2025-04-15 DIAGNOSIS — J44.9 STAGE 2 MODERATE COPD BY GOLD CLASSIFICATION (HCC): Primary | ICD-10-CM

## 2025-04-15 PROCEDURE — 93798 PHYS/QHP OP CAR RHAB W/ECG: CPT

## 2025-04-15 NOTE — TELEPHONE ENCOUNTER
Patient called and stated he is liking the Stiolto inhaler and would like to stay on that inhaler. He only has refills til May and would like a year supply of it sent to the [facility]. Please advise.

## 2025-04-17 ENCOUNTER — APPOINTMENT (OUTPATIENT)
Dept: CARDIAC REHAB | Age: 75
End: 2025-04-17
Payer: OTHER GOVERNMENT

## 2025-04-22 ENCOUNTER — HOSPITAL ENCOUNTER (OUTPATIENT)
Dept: CARDIAC REHAB | Age: 75
Setting detail: THERAPIES SERIES
Discharge: HOME | End: 2025-04-22
Payer: OTHER GOVERNMENT

## 2025-04-22 PROCEDURE — 93798 PHYS/QHP OP CAR RHAB W/ECG: CPT

## 2025-04-24 ENCOUNTER — HOSPITAL ENCOUNTER (OUTPATIENT)
Dept: CARDIAC REHAB | Age: 75
Setting detail: THERAPIES SERIES
Discharge: HOME | End: 2025-04-24
Payer: OTHER GOVERNMENT

## 2025-04-24 PROCEDURE — 93798 PHYS/QHP OP CAR RHAB W/ECG: CPT

## 2025-04-28 ENCOUNTER — OFFICE VISIT (OUTPATIENT)
Dept: CARDIOLOGY CLINIC | Age: 75
End: 2025-04-28
Payer: OTHER GOVERNMENT

## 2025-04-28 VITALS
WEIGHT: 274 LBS | SYSTOLIC BLOOD PRESSURE: 130 MMHG | BODY MASS INDEX: 39.31 KG/M2 | DIASTOLIC BLOOD PRESSURE: 74 MMHG | HEART RATE: 61 BPM

## 2025-04-28 DIAGNOSIS — Z95.2 S/P TAVR (TRANSCATHETER AORTIC VALVE REPLACEMENT): ICD-10-CM

## 2025-04-28 DIAGNOSIS — R06.02 SHORTNESS OF BREATH: Primary | ICD-10-CM

## 2025-04-28 PROCEDURE — 1123F ACP DISCUSS/DSCN MKR DOCD: CPT | Performed by: INTERNAL MEDICINE

## 2025-04-28 PROCEDURE — 3078F DIAST BP <80 MM HG: CPT | Performed by: INTERNAL MEDICINE

## 2025-04-28 PROCEDURE — 3075F SYST BP GE 130 - 139MM HG: CPT | Performed by: INTERNAL MEDICINE

## 2025-04-28 PROCEDURE — 99214 OFFICE O/P EST MOD 30 MIN: CPT | Performed by: INTERNAL MEDICINE

## 2025-04-28 RX ORDER — MAGNESIUM OXIDE 400 MG/1
400 TABLET ORAL 2 TIMES DAILY
Qty: 180 TABLET | Refills: 3 | Status: SHIPPED | OUTPATIENT
Start: 2025-04-28

## 2025-04-28 RX ORDER — HYDRALAZINE HYDROCHLORIDE 25 MG/1
25 TABLET, FILM COATED ORAL 3 TIMES DAILY
Qty: 270 TABLET | Refills: 3 | Status: SHIPPED | OUTPATIENT
Start: 2025-04-28

## 2025-04-28 RX ORDER — LOSARTAN POTASSIUM 50 MG/1
50 TABLET ORAL DAILY
Qty: 90 TABLET | Refills: 3 | Status: SHIPPED | OUTPATIENT
Start: 2025-04-28

## 2025-04-28 RX ORDER — ASPIRIN 81 MG/1
81 TABLET ORAL DAILY
Qty: 90 TABLET | Refills: 3 | Status: SHIPPED | OUTPATIENT
Start: 2025-04-28

## 2025-04-28 RX ORDER — ATORVASTATIN CALCIUM 40 MG/1
40 TABLET, FILM COATED ORAL DAILY
Qty: 90 TABLET | Refills: 3 | Status: SHIPPED | OUTPATIENT
Start: 2025-04-28

## 2025-04-28 RX ORDER — AMLODIPINE BESYLATE 2.5 MG/1
7.5 TABLET ORAL DAILY
Qty: 270 TABLET | Refills: 3 | Status: SHIPPED | OUTPATIENT
Start: 2025-04-28

## 2025-04-28 RX ORDER — CLOPIDOGREL BISULFATE 75 MG/1
75 TABLET ORAL DAILY
Qty: 90 TABLET | Refills: 3 | Status: SHIPPED | OUTPATIENT
Start: 2025-04-28

## 2025-04-28 NOTE — PROGRESS NOTES
Reports fatigue has became a lot worse  Using swims and is gasping for air while doing so  Trying to walk behind lawnmower and it will completely wear him out that day or the next    Reports talking to PCP, had some blood work RBC was low and was sent to GI and had labs repeated and was normal.   Needs cardiac clearance for colonoscopy and EGD for [facility].       
dutasteride (AVODART) 0.5 MG capsule, Take 1 capsule by mouth daily, Disp: 90 capsule, Rfl: 3    Past Medical History  Jon  has a past medical history of Arthritis, CAD (coronary artery disease), CHF (congestive heart failure) (HCC), COPD (chronic obstructive pulmonary disease) (HCC), Hx of blood clots, Hyperlipidemia, Hypertension, PE (pulmonary embolism), and Pneumonia.    Social History  Jon  reports that he quit smoking about 45 years ago. His smoking use included cigarettes. He has a 40 pack-year smoking history. He has never used smokeless tobacco. He reports current alcohol use of about 1.0 standard drink of alcohol per week. He reports that he does not use drugs.    Family History  Jon family history includes Cancer in his mother; Heart Disease in his father and maternal uncle; High Blood Pressure in his mother; Hypertension in his mother; Kidney Disease in his mother.    Past Surgical History   Past Surgical History:   Procedure Laterality Date    AORTIC VALVE REPLACEMENT      CARDIAC CATHETERIZATION      CARDIAC SURGERY      CABG X5    CARDIAC VALVE REPLACEMENT      COLONOSCOPY      CORONARY ARTERY BYPASS GRAFT  10/2003    [facility]    CYSTOSCOPY N/A 11/21/2022    CYSTOSCOPY URETHRAL DILATATION performed by Sotero Durbin Jr., MD at [facility] OR    ENDOSCOPY, COLON, DIAGNOSTIC      HEEL SPUR SURGERY Left 2007    HEEL SPUR SURGERY Right 2008    HYDROCELE EXCISION Right 1998    INGUINAL HERNIA REPAIR Right 1992    KNEE SURGERY  2016    right knee    SKIN CANCER EXCISION      pt states he had 2 skin spots removed from chest and left arm     TURP N/A 05/16/2022    CYSTOSCOPY, GREENLIGHT PHOTO VAPORIZATION OF THE PROSTATE performed by Sotero Durbin Jr., MD at [facility] OR    US BIOPSY LIVER PERCUTANEOUS  02/09/2023    US GUIDED LIVER BIOPSY PERCUTANEOUS 2/9/2023 Cornel Okeefe DO [facility] ULTRASOUND    VENA CAVA FILTER PLACEMENT  10/2003    [place] filter       Review of Systems   Constitutional: Negative for

## 2025-04-29 ENCOUNTER — TELEPHONE (OUTPATIENT)
Dept: CARDIOLOGY CLINIC | Age: 75
End: 2025-04-29

## 2025-04-29 ENCOUNTER — HOSPITAL ENCOUNTER (OUTPATIENT)
Age: 75
Discharge: HOME | End: 2025-05-01
Attending: INTERNAL MEDICINE
Payer: OTHER GOVERNMENT

## 2025-04-29 ENCOUNTER — HOSPITAL ENCOUNTER (OUTPATIENT)
Dept: NUCLEAR MEDICINE | Age: 75
Discharge: HOME | End: 2025-04-29
Attending: INTERNAL MEDICINE
Payer: OTHER GOVERNMENT

## 2025-04-29 VITALS — HEIGHT: 70 IN | BODY MASS INDEX: 39.22 KG/M2 | WEIGHT: 274 LBS

## 2025-04-29 DIAGNOSIS — R06.02 SHORTNESS OF BREATH: ICD-10-CM

## 2025-04-29 LAB
ECHO AO ASC DIAM: 3.6 CM
ECHO AR MAX VEL PISA: 5 M/S
ECHO AV AREA PEAK VELOCITY: 2.2 CM2
ECHO AV AREA VTI: 2.2 CM2
ECHO AV MEAN GRADIENT: 14 MMHG
ECHO AV MEAN VELOCITY: 1.7 M/S
ECHO AV PEAK GRADIENT: 25 MMHG
ECHO AV PEAK VELOCITY: 2.5 M/S
ECHO AV REGURGITANT PHT: 424 MS
ECHO AV VELOCITY RATIO: 0.6
ECHO AV VTI: 54.7 CM
ECHO EST RA PRESSURE: 3 MMHG
ECHO LA AREA 2C: 23.9 CM2
ECHO LA AREA 4C: 25.6 CM2
ECHO LA DIAMETER: 5 CM
ECHO LA MAJOR AXIS: 6.5 CM
ECHO LA MINOR AXIS: 5.7 CM
ECHO LA VOL BP: 87 ML (ref 18–58)
ECHO LA VOL MOD A2C: 82 ML (ref 18–58)
ECHO LA VOL MOD A4C: 82 ML (ref 18–58)
ECHO LV E' LATERAL VELOCITY: 5.8 CM/S
ECHO LV E' SEPTAL VELOCITY: 6.2 CM/S
ECHO LV EF PHYSICIAN: 55 %
ECHO LV FRACTIONAL SHORTENING: 34 % (ref 28–44)
ECHO LV INTERNAL DIMENSION DIASTOLIC: 6.4 CM (ref 4.2–5.9)
ECHO LV INTERNAL DIMENSION SYSTOLIC: 4.2 CM
ECHO LV ISOVOLUMETRIC RELAXATION TIME (IVRT): 102 MS
ECHO LV IVSD: 1.2 CM (ref 0.6–1)
ECHO LV MASS 2D: 349.5 G (ref 88–224)
ECHO LV POSTERIOR WALL DIASTOLIC: 1.2 CM (ref 0.6–1)
ECHO LV RELATIVE WALL THICKNESS RATIO: 0.38
ECHO LVOT AREA: 3.8 CM2
ECHO LVOT AV VTI INDEX: 0.57
ECHO LVOT DIAM: 2.2 CM
ECHO LVOT MEAN GRADIENT: 5 MMHG
ECHO LVOT PEAK GRADIENT: 9 MMHG
ECHO LVOT PEAK VELOCITY: 1.5 M/S
ECHO LVOT SV: 119.3 ML
ECHO LVOT VTI: 31.4 CM
ECHO MV A VELOCITY: 1 M/S
ECHO MV E DECELERATION TIME (DT): 296 MS
ECHO MV E VELOCITY: 0.84 M/S
ECHO MV E/A RATIO: 0.84
ECHO MV E/E' LATERAL: 14.48
ECHO MV E/E' RATIO (AVERAGED): 14.02
ECHO MV E/E' SEPTAL: 13.55
ECHO PULMONARY ARTERY END DIASTOLIC PRESSURE: 6 MMHG
ECHO PV MAX VELOCITY: 0.7 M/S
ECHO PV PEAK GRADIENT: 2 MMHG
ECHO PV REGURGITANT MAX VELOCITY: 1.3 M/S
ECHO RIGHT VENTRICULAR SYSTOLIC PRESSURE (RVSP): 35 MMHG
ECHO RV INTERNAL DIMENSION: 4.7 CM
ECHO RV TAPSE: 2 CM (ref 1.7–?)
ECHO TV E WAVE: 0.7 M/S
ECHO TV REGURGITANT MAX VELOCITY: 2.82 M/S
ECHO TV REGURGITANT PEAK GRADIENT: 32 MMHG

## 2025-04-29 PROCEDURE — 3430000000 HC RX DIAGNOSTIC RADIOPHARMACEUTICAL: Performed by: INTERNAL MEDICINE

## 2025-04-29 PROCEDURE — A9500 TC99M SESTAMIBI: HCPCS | Performed by: INTERNAL MEDICINE

## 2025-04-29 PROCEDURE — 93306 TTE W/DOPPLER COMPLETE: CPT | Performed by: INTERNAL MEDICINE

## 2025-04-29 PROCEDURE — 6360000002 HC RX W HCPCS: Performed by: INTERNAL MEDICINE

## 2025-04-29 PROCEDURE — 93017 CV STRESS TEST TRACING ONLY: CPT

## 2025-04-29 PROCEDURE — 93306 TTE W/DOPPLER COMPLETE: CPT

## 2025-04-29 PROCEDURE — 78452 HT MUSCLE IMAGE SPECT MULT: CPT

## 2025-04-29 RX ORDER — REGADENOSON 0.08 MG/ML
0.4 INJECTION, SOLUTION INTRAVENOUS
Status: COMPLETED | OUTPATIENT
Start: 2025-04-29 | End: 2025-04-29

## 2025-04-29 RX ORDER — TETRAKIS(2-METHOXYISOBUTYLISOCYANIDE)COPPER(I) TETRAFLUOROBORATE 1 MG/ML
34.9 INJECTION, POWDER, LYOPHILIZED, FOR SOLUTION INTRAVENOUS
Status: COMPLETED | OUTPATIENT
Start: 2025-04-29 | End: 2025-04-29

## 2025-04-29 RX ORDER — TETRAKIS(2-METHOXYISOBUTYLISOCYANIDE)COPPER(I) TETRAFLUOROBORATE 1 MG/ML
11 INJECTION, POWDER, LYOPHILIZED, FOR SOLUTION INTRAVENOUS
Status: COMPLETED | OUTPATIENT
Start: 2025-04-29 | End: 2025-04-29

## 2025-04-29 RX ADMIN — Medication 34.9 MILLICURIE: at 11:30

## 2025-04-29 RX ADMIN — Medication 11 MILLICURIE: at 10:30

## 2025-04-29 RX ADMIN — REGADENOSON 0.4 MG: 0.08 INJECTION, SOLUTION INTRAVENOUS at 11:30

## 2025-04-30 LAB
ECHO BSA: 2.48 M2
NUC STRESS EJECTION FRACTION: 52 %
STRESS BASELINE DIAS BP: 73 MMHG
STRESS BASELINE HR: 53 BPM
STRESS BASELINE SYS BP: 137 MMHG
STRESS ESTIMATED WORKLOAD: 1 METS
STRESS PEAK DIAS BP: 74 MMHG
STRESS PEAK SYS BP: 151 MMHG
STRESS PERCENT HR ACHIEVED: 52 %
STRESS POST PEAK HR: 76 BPM
STRESS RATE PRESSURE PRODUCT: NORMAL BPM*MMHG
STRESS STAGE 1 BP: NORMAL MMHG
STRESS STAGE 1 DURATION: 1 MIN:SEC
STRESS STAGE 1 HR: 55 BPM
STRESS STAGE 2 BP: NORMAL MMHG
STRESS STAGE 2 DURATION: 1 MIN:SEC
STRESS STAGE 2 HR: 76 BPM
STRESS STAGE 3 BP: NORMAL MMHG
STRESS STAGE 3 DURATION: 1 MIN:SEC
STRESS STAGE 3 HR: 67 BPM
STRESS STAGE RECOVERY 1 BP: NORMAL MMHG
STRESS STAGE RECOVERY 1 DURATION: 1 MIN:SEC
STRESS STAGE RECOVERY 1 HR: 63 BPM
STRESS STAGE RECOVERY 2 BP: NORMAL MMHG
STRESS STAGE RECOVERY 2 DURATION: 1 MIN:SEC
STRESS STAGE RECOVERY 2 HR: 61 BPM
STRESS STAGE RECOVERY 3 BP: NORMAL MMHG
STRESS STAGE RECOVERY 3 DURATION: 1 MIN:SEC
STRESS STAGE RECOVERY 3 HR: 60 BPM
STRESS STAGE RECOVERY 4 BP: NORMAL MMHG
STRESS STAGE RECOVERY 4 DURATION: 2 MIN:SEC
STRESS STAGE RECOVERY 4 HR: 58 BPM
STRESS TARGET HR: 145 BPM
TID: 1.15

## 2025-05-01 ENCOUNTER — TELEPHONE (OUTPATIENT)
Dept: CARDIOLOGY CLINIC | Age: 75
End: 2025-05-01

## 2025-05-01 DIAGNOSIS — R94.39 ABNORMAL STRESS TEST: Primary | ICD-10-CM

## 2025-05-01 NOTE — TELEPHONE ENCOUNTER
Results of stress test and echo:  Stress Combined Conclusion: The study is most consistent with myocardial ischemia. Findings suggest a high risk of cardiac events.    Stress Function: Post-stress ejection fraction is 52%.    Perfusion Defect: There is a global left ventricular stress perfusion defect that is large in size present in the anterior and anterolateral segment(s) that is reversible. The defect is consistent with abnormal perfusion in the LAD or RCA territories. Viability in the area is good. This defect was visualized during the stress phase of imaging. The defect appears to be ischemia. There is a severe global left ventricular stress perfusion defect that is medium in size present in the inferior segment(s) that is reversible. The defect is consistent with abnormal perfusion in the RCA territory. This defect was visualized during the stress phase of imaging. The defect appears to be ischemia.    Perfusion Conclusion: TID ratio is 1.15.    ECG: Resting ECG demonstrates normal sinus rhythm.    Stress Test: A pharmacological stress test was performed using regadenoson (Lexiscan). PO caffeine given as a reversal agent. The patient reported no symptoms during the stress test. Blood pressure demonstrated a normal response to stress.      Left Ventricle: Normal left ventricular systolic function with a visually estimated EF of 55 - 60%. Left ventricle size is normal. Normal wall thickness. Normal wall motion. Diastolic dysfunction present with normal LV EF.    Right Ventricle: Right ventricle size is normal. Normal systolic function.    Aortic Valve: Appropriately positioned transcatheter bioprosthetic valve that is well-seated. AV mean gradient is 14 mmHg. No regurgitation. Mild paravalvular regurgitation. No stenosis. Normal prosthetic gradient.    Tricuspid Valve: Mild to moderate regurgitation.    Left Atrium: Left atrium is moderately dilated.    Right Atrium: Right atrium is mildly dilated.    IVC/SVC:

## 2025-05-02 NOTE — TELEPHONE ENCOUNTER
Patient notified and voiced understanding.  Ordered and given to scheduling.    Dr Navarrete, patient shared that he was having skin cancer removed from his face.  He is asking if he should he do that prior since he wouldn't be able to hold meds after for 6-12 months?

## 2025-05-05 NOTE — TELEPHONE ENCOUNTER
Spoke with patient to schedule left heart cath for 5/14/25 with arrival time of 10:00 am. Patient to hold Metformin on day of procedure, take all other meds as usual. All instructions reviewed with patient via phone and mailed this date, patient verbalized understanding. Case scheduled with Kelsie in cath lab.

## 2025-05-05 NOTE — TELEPHONE ENCOUNTER
Pt is asking how long he can safely be off asa?    States Dr Head did not tell him to stop the plavix?

## 2025-05-06 PROBLEM — R94.39 ABNORMAL STRESS TEST: Status: ACTIVE | Noted: 2025-05-01

## 2025-05-07 NOTE — TELEPHONE ENCOUNTER
Spoke with Dr. Navarerte and he states okay for patient to hold Plavix and ASA for 5 days if needed.   Form out for signature.   Pt notified.     Dr. Navarrete please agree.

## 2025-05-07 NOTE — TELEPHONE ENCOUNTER
I called Dr. Head office and spoke to Rukhsana.   Procedure with Dr. Head on 5/13 and cath on 5/14.  They feel like it will be okay for patient to have procedure with them and have a cath the next day.

## 2025-05-13 ENCOUNTER — LAB (OUTPATIENT)
Dept: LAB | Age: 75
End: 2025-05-13

## 2025-05-13 ENCOUNTER — PREP FOR PROCEDURE (OUTPATIENT)
Dept: CARDIOLOGY CLINIC | Age: 75
End: 2025-05-13

## 2025-05-13 LAB — MAGNESIUM SERPL-MCNC: 1.5 MG/DL (ref 1.6–2.6)

## 2025-05-13 RX ORDER — HYDROCORTISONE SODIUM SUCCINATE 100 MG/2ML
200 INJECTION INTRAMUSCULAR; INTRAVENOUS ONCE
Status: CANCELLED | OUTPATIENT
Start: 2025-05-13 | End: 2025-05-13

## 2025-05-13 RX ORDER — ASPIRIN 325 MG
325 TABLET ORAL ONCE
Status: CANCELLED | OUTPATIENT
Start: 2025-05-13 | End: 2025-05-13

## 2025-05-13 RX ORDER — SODIUM CHLORIDE 0.9 % (FLUSH) 0.9 %
5-40 SYRINGE (ML) INJECTION PRN
Status: CANCELLED | OUTPATIENT
Start: 2025-05-13

## 2025-05-13 RX ORDER — DIPHENHYDRAMINE HYDROCHLORIDE 50 MG/ML
50 INJECTION, SOLUTION INTRAMUSCULAR; INTRAVENOUS ONCE
Status: CANCELLED | OUTPATIENT
Start: 2025-05-13 | End: 2025-05-13

## 2025-05-13 RX ORDER — SODIUM CHLORIDE 9 MG/ML
INJECTION, SOLUTION INTRAVENOUS CONTINUOUS
Status: CANCELLED | OUTPATIENT
Start: 2025-05-13

## 2025-05-13 RX ORDER — SODIUM CHLORIDE 0.9 % (FLUSH) 0.9 %
5-40 SYRINGE (ML) INJECTION EVERY 12 HOURS SCHEDULED
Status: CANCELLED | OUTPATIENT
Start: 2025-05-13

## 2025-05-13 RX ORDER — NITROGLYCERIN 0.4 MG/1
0.4 TABLET SUBLINGUAL EVERY 5 MIN PRN
Status: CANCELLED | OUTPATIENT
Start: 2025-05-13

## 2025-05-14 ENCOUNTER — HOSPITAL ENCOUNTER (OUTPATIENT)
Age: 75
Setting detail: OUTPATIENT SURGERY
Discharge: HOME | End: 2025-05-14
Attending: INTERNAL MEDICINE | Admitting: INTERNAL MEDICINE
Payer: OTHER GOVERNMENT

## 2025-05-14 VITALS
HEIGHT: 70 IN | OXYGEN SATURATION: 94 % | DIASTOLIC BLOOD PRESSURE: 69 MMHG | WEIGHT: 279 LBS | RESPIRATION RATE: 20 BRPM | HEART RATE: 61 BPM | SYSTOLIC BLOOD PRESSURE: 135 MMHG | BODY MASS INDEX: 39.94 KG/M2 | TEMPERATURE: 97.8 F

## 2025-05-14 DIAGNOSIS — R94.39 ABNORMAL STRESS TEST: ICD-10-CM

## 2025-05-14 LAB
ABO GROUP BLD: NORMAL
ANION GAP SERPL CALC-SCNC: 12 MEQ/L (ref 8–16)
APTT PPP: 30.1 SECONDS (ref 22–38)
BUN SERPL-MCNC: 15 MG/DL (ref 8–23)
CALCIUM SERPL-MCNC: 9.6 MG/DL (ref 8.8–10.2)
CHLORIDE SERPL-SCNC: 102 MEQ/L (ref 98–111)
CO2 SERPL-SCNC: 21 MEQ/L (ref 22–29)
CREAT SERPL-MCNC: 0.7 MG/DL (ref 0.7–1.2)
DEPRECATED RDW RBC AUTO: 41.1 FL (ref 35–45)
ECHO BSA: 2.5 M2
EKG ATRIAL RATE: 57 BPM
EKG P AXIS: 38 DEGREES
EKG P-R INTERVAL: 210 MS
EKG Q-T INTERVAL: 428 MS
EKG QRS DURATION: 98 MS
EKG QTC CALCULATION (BAZETT): 416 MS
EKG R AXIS: -11 DEGREES
EKG T AXIS: 49 DEGREES
EKG VENTRICULAR RATE: 57 BPM
ERYTHROCYTE [DISTWIDTH] IN BLOOD BY AUTOMATED COUNT: 12.9 % (ref 11.5–14.5)
GFR SERPL CREATININE-BSD FRML MDRD: > 90 ML/MIN/1.73M2
GLUCOSE SERPL-MCNC: 215 MG/DL (ref 74–109)
HCT VFR BLD AUTO: 36.3 % (ref 42–52)
HGB BLD-MCNC: 12.7 GM/DL (ref 14–18)
IAT IGG-SP REAG SERPL QL: NORMAL
INR PPP: 1 (ref 0.85–1.13)
MCH RBC QN AUTO: 31 PG (ref 26–33)
MCHC RBC AUTO-ENTMCNC: 35 GM/DL (ref 32.2–35.5)
MCV RBC AUTO: 88.5 FL (ref 80–94)
PLATELET # BLD AUTO: 116 THOU/MM3 (ref 130–400)
PMV BLD AUTO: 10.4 FL (ref 9.4–12.4)
POTASSIUM SERPL-SCNC: 4.5 MEQ/L (ref 3.5–5.2)
RBC # BLD AUTO: 4.1 MILL/MM3 (ref 4.7–6.1)
RH BLD: NORMAL
SODIUM SERPL-SCNC: 135 MEQ/L (ref 135–145)
WBC # BLD AUTO: 4.7 THOU/MM3 (ref 4.8–10.8)

## 2025-05-14 PROCEDURE — 93459 L HRT ART/GRFT ANGIO: CPT | Performed by: INTERNAL MEDICINE

## 2025-05-14 PROCEDURE — 86900 BLOOD TYPING SEROLOGIC ABO: CPT

## 2025-05-14 PROCEDURE — 2580000003 HC RX 258: Performed by: STUDENT IN AN ORGANIZED HEALTH CARE EDUCATION/TRAINING PROGRAM

## 2025-05-14 PROCEDURE — C1894 INTRO/SHEATH, NON-LASER: HCPCS | Performed by: INTERNAL MEDICINE

## 2025-05-14 PROCEDURE — 2709999900 HC NON-CHARGEABLE SUPPLY: Performed by: INTERNAL MEDICINE

## 2025-05-14 PROCEDURE — 86885 COOMBS TEST INDIRECT QUAL: CPT

## 2025-05-14 PROCEDURE — 6360000004 HC RX CONTRAST MEDICATION: Performed by: INTERNAL MEDICINE

## 2025-05-14 PROCEDURE — C1760 CLOSURE DEV, VASC: HCPCS | Performed by: INTERNAL MEDICINE

## 2025-05-14 PROCEDURE — 85730 THROMBOPLASTIN TIME PARTIAL: CPT

## 2025-05-14 PROCEDURE — G0269 OCCLUSIVE DEVICE IN VEIN ART: HCPCS | Performed by: INTERNAL MEDICINE

## 2025-05-14 PROCEDURE — 85027 COMPLETE CBC AUTOMATED: CPT

## 2025-05-14 PROCEDURE — 85610 PROTHROMBIN TIME: CPT

## 2025-05-14 PROCEDURE — 99152 MOD SED SAME PHYS/QHP 5/>YRS: CPT | Performed by: INTERNAL MEDICINE

## 2025-05-14 PROCEDURE — 93010 ELECTROCARDIOGRAM REPORT: CPT | Performed by: INTERNAL MEDICINE

## 2025-05-14 PROCEDURE — 6360000002 HC RX W HCPCS: Performed by: INTERNAL MEDICINE

## 2025-05-14 PROCEDURE — 93005 ELECTROCARDIOGRAM TRACING: CPT | Performed by: STUDENT IN AN ORGANIZED HEALTH CARE EDUCATION/TRAINING PROGRAM

## 2025-05-14 PROCEDURE — 80048 BASIC METABOLIC PNL TOTAL CA: CPT

## 2025-05-14 PROCEDURE — 86901 BLOOD TYPING SEROLOGIC RH(D): CPT

## 2025-05-14 PROCEDURE — C1769 GUIDE WIRE: HCPCS | Performed by: INTERNAL MEDICINE

## 2025-05-14 PROCEDURE — 36415 COLL VENOUS BLD VENIPUNCTURE: CPT

## 2025-05-14 RX ORDER — FENTANYL CITRATE 50 UG/ML
INJECTION, SOLUTION INTRAMUSCULAR; INTRAVENOUS PRN
Status: DISCONTINUED | OUTPATIENT
Start: 2025-05-14 | End: 2025-05-14 | Stop reason: HOSPADM

## 2025-05-14 RX ORDER — ACETAMINOPHEN 325 MG/1
650 TABLET ORAL EVERY 4 HOURS PRN
Status: DISCONTINUED | OUTPATIENT
Start: 2025-05-14 | End: 2025-05-14 | Stop reason: HOSPADM

## 2025-05-14 RX ORDER — SODIUM CHLORIDE 9 MG/ML
INJECTION, SOLUTION INTRAVENOUS PRN
Status: DISCONTINUED | OUTPATIENT
Start: 2025-05-14 | End: 2025-05-14 | Stop reason: HOSPADM

## 2025-05-14 RX ORDER — ATROPINE SULFATE 0.4 MG/ML
0.5 INJECTION, SOLUTION INTRAVENOUS
Status: DISCONTINUED | OUTPATIENT
Start: 2025-05-14 | End: 2025-05-14 | Stop reason: HOSPADM

## 2025-05-14 RX ORDER — SODIUM CHLORIDE 9 MG/ML
INJECTION, SOLUTION INTRAVENOUS CONTINUOUS
Status: DISCONTINUED | OUTPATIENT
Start: 2025-05-14 | End: 2025-05-14 | Stop reason: HOSPADM

## 2025-05-14 RX ORDER — IOPAMIDOL 755 MG/ML
INJECTION, SOLUTION INTRAVASCULAR PRN
Status: DISCONTINUED | OUTPATIENT
Start: 2025-05-14 | End: 2025-05-14 | Stop reason: HOSPADM

## 2025-05-14 RX ORDER — SODIUM CHLORIDE 0.9 % (FLUSH) 0.9 %
5-40 SYRINGE (ML) INJECTION PRN
Status: DISCONTINUED | OUTPATIENT
Start: 2025-05-14 | End: 2025-05-14 | Stop reason: HOSPADM

## 2025-05-14 RX ORDER — ASPIRIN 325 MG
325 TABLET ORAL ONCE
Status: DISCONTINUED | OUTPATIENT
Start: 2025-05-14 | End: 2025-05-14 | Stop reason: HOSPADM

## 2025-05-14 RX ORDER — SODIUM CHLORIDE 0.9 % (FLUSH) 0.9 %
5-40 SYRINGE (ML) INJECTION EVERY 12 HOURS SCHEDULED
Status: DISCONTINUED | OUTPATIENT
Start: 2025-05-14 | End: 2025-05-14 | Stop reason: HOSPADM

## 2025-05-14 RX ORDER — LIDOCAINE HYDROCHLORIDE 20 MG/ML
INJECTION, SOLUTION EPIDURAL; INFILTRATION; INTRACAUDAL; PERINEURAL PRN
Status: DISCONTINUED | OUTPATIENT
Start: 2025-05-14 | End: 2025-05-14 | Stop reason: HOSPADM

## 2025-05-14 RX ORDER — NITROGLYCERIN 0.4 MG/1
0.4 TABLET SUBLINGUAL EVERY 5 MIN PRN
Status: DISCONTINUED | OUTPATIENT
Start: 2025-05-14 | End: 2025-05-14 | Stop reason: HOSPADM

## 2025-05-14 RX ORDER — DIPHENHYDRAMINE HYDROCHLORIDE 50 MG/ML
50 INJECTION, SOLUTION INTRAMUSCULAR; INTRAVENOUS ONCE
Status: DISCONTINUED | OUTPATIENT
Start: 2025-05-14 | End: 2025-05-14 | Stop reason: HOSPADM

## 2025-05-14 RX ORDER — HYDROCORTISONE SODIUM SUCCINATE 100 MG/2ML
200 INJECTION INTRAMUSCULAR; INTRAVENOUS ONCE
Status: DISCONTINUED | OUTPATIENT
Start: 2025-05-14 | End: 2025-05-14 | Stop reason: HOSPADM

## 2025-05-14 RX ORDER — MIDAZOLAM HYDROCHLORIDE 1 MG/ML
INJECTION, SOLUTION INTRAMUSCULAR; INTRAVENOUS PRN
Status: DISCONTINUED | OUTPATIENT
Start: 2025-05-14 | End: 2025-05-14 | Stop reason: HOSPADM

## 2025-05-14 RX ADMIN — SODIUM CHLORIDE: 0.9 INJECTION, SOLUTION INTRAVENOUS at 10:39

## 2025-05-14 ASSESSMENT — PAIN SCALES - GENERAL: PAINLEVEL_OUTOF10: 0

## 2025-05-14 NOTE — PROGRESS NOTES
1320 Care taken over from cath lab, right groin stable . Patient reinstructed on bedrest, instructed to keep legs straight, not to cross legs, not to lift head off of pillow, not to laugh hard, if coughs to guard site with hand, voices understanding.\

## 2025-05-14 NOTE — H&P
[facility] [place] [facility]  Sedation/Analgesia History & Physical    Pt Name: Jon Ji  Account number: [id number]  MRN: [id number]  YOB: 1950  Provider Performing Procedure: Gomez Navarrete MD MD  Referring Provider: Gomez Navarrete MD   Primary Care Physician: Cailin Contreras APRN - CNP  Date: 5/14/2025    PRE-PROCEDURE    Code Status: FULL CODE  Brief History/Pre-Procedure Diagnosis:   JONES  Abnormal stress test  CABG x 5  Hx of OM PCI    Consent: : I have discussed with the patient risks, benefits, and alternatives (and relevant risks, benefits, and side effects related to alternatives or not receiving care), and likelihood of the success.   The patient and/or representative appear to understand and agree to proceed.  The discussion encompasses risks, benefits, and side effects related to the alternatives and the risks related to not receiving the proposed care, treatment, and services.     The indication, risks and benefits of the procedure and possible therapeutic consequences and alternatives were discussed with the patient. The patient was given the opportunity to ask questions and to have them answered to his/her satisfaction. Risks of the procedure include but are not limited to the following: Bleeding, hematoma including retroperitoneal hemmorhage, infection, pain and discomfort, injury to the aorta and other blood vessels, rhythm disturbance, low blood pressure, myocardial infarction, stroke, kidney damage/failure, myocardial perforation, allergic reactions to sedatives/contrast material, loss of pulse/vascular compromise requiring surgery, aneurysm/pseudoaneurysm formation, possible loss of a limb/hand/leg, needing blood transfusion, requiring emergent open heart surgery or emergent coronary intervention, the need for intubation/respiratory support, the requirement for defibrillation/cardioversion, and death. Alternatives to and omission of the suggested procedure were

## 2025-05-14 NOTE — BRIEF OP NOTE
[facility]  Sedation/Analgesia Post Sedation Record    Pt Name: Jon Ji  Account number: [id number]  MRN: [id number]  YOB: 1950  Procedure Performed By: Gomez Navarrete MD MD FACC, FSCAI, RPVI  Primary Care Physician: Cailin Contreras APRN - CNP  Date: 5/14/2025    POST-PROCEDURE    Physicians/Assistants: Gomez Navarrete MD, JAIME, [facility]ES[facility]    Procedure Performed: Cor Angio    Sedation/Anesthesia: Versed/ Fentanyl and 2% xylocaine local anesthesia.      Estimated Blood Loss: < 50 ml.     Specimens Removed: None         Disposition of Specimen: N/A        Complications: No Immediate Complications.       Post-procedure Diagnosis/Findings:       Patent LIMA to LAD with collaterals to the R PDA  SVG to PDA occluded  SVG to OM3 patent  Radial to OM2 patent  Native LM occlusion  Native RCA with stent patent to PLB  EF preserved  No major gradient across TAVR    Vascade 5Fr            Electronically signed by Gomez Navarrete MD on 5/14/25 at 1:08 PM EDT   Interventional Cardiology

## 2025-05-14 NOTE — PROGRESS NOTES
0930 Patient admitted to 2E02  ambulatory for Heart Cath.  Patient NPO. Patient accompanied by wife, Kendra.  Vital signs obtained.   Assessment and data collection intiated.   Oriented to room.  Policies and procedures for 2E explained.   All questions answered with no further questions at this time.   Fall prevention and safety precautions discussed with patient.     2765 Spoke with patient regarding holding metformin after procedure due to dye reaction, voices understanding, instructed patient additional instructions on discharge.

## 2025-05-14 NOTE — PLAN OF CARE
Problem: Pain  Goal: Verbalizes/displays adequate comfort level or baseline comfort level  Outcome: Progressing     Problem: Cardiovascular - Adult  Goal: Maintains optimal cardiac output and hemodynamic stability  Outcome: Progressing  Flowsheets (Taken 5/14/2025 1015)  Maintains optimal cardiac output and hemodynamic stability:   Monitor blood pressure and heart rate   Monitor urine output and notify Licensed Independent Practitioner for values outside of normal range     Problem: Discharge Planning  Goal: Discharge to home or other facility with appropriate resources  Outcome: Progressing  Flowsheets (Taken 5/14/2025 1015)  Discharge to home or other facility with appropriate resources:   Identify barriers to discharge with patient and caregiver   Arrange for needed discharge resources and transportation as appropriate   Care plan reviewed with patient.  Patient verbalizes understanding of the plan of care and contributes to goal setting.

## 2025-05-27 ENCOUNTER — HOSPITAL ENCOUNTER (OUTPATIENT)
Dept: CARDIAC REHAB | Age: 75
Discharge: HOME | End: 2025-05-27

## 2025-05-27 NOTE — PLAN OF CARE
next 4 weeks by increasing time 2-3 min/week. Progression: increase aerobic activity up to 15 min over next 4 weeks by increasing time 2-3 min/week.   Progression:  Work on increasing workloads as tolerated weekly. Progression: Increase intensity 5-10% over the next 4 weeks as tolerated Progression: Once patien returns, slowly increase aerobic intensity 5-10% over he next 4 weeks. Progression:     increase aerobic intensity 5-10% over he next 4 weeks.  Progression:  Increase time/intensity when RPE <13, and HR is in THRR     Strength Training Strength Training Strength Training Strength Training Strength Training Strength Training  Strength Training   [x] Yes      [] No  Upper and Lower body strength training 2x/wk    Wt: 3#       Reps:  8-15    *Increase wt. after completing 15 reps with an RPE of <12/13. [x] Yes      [] No  Upper and Lower body strength training 2x/wk    Wt: 4#       Reps:  8-15    *Increase wt. after completing 15 reps with an RPE of <12/13. [x] Yes      [] No  Upper and Lower body strength training 2x/wk    Wt: 4#       Reps:  8-15    *Increase wt. after completing 15 reps with an RPE of <12/13. [x] Yes      [] No  Upper and Lower body strength training 2x/wk    Wt: 4#       Reps:  8-15    *Increase wt. after completing 15 reps with an RPE of <12/13. [x] Yes      [] No  Upper and Lower body strength training 2x/wk    Wt: 4#       Reps:  8-15    *Increase wt. after completing 15 reps with an RPE of <12/13. [x] Yes      [] No  Upper and Lower body strength training 2x/wk    Wt: 4#       Reps:  8-15    *Increase wt. after completing 15 reps with an RPE of <12/13.  Continue Strength Training at home   [] Exercise Log & Strength training handout given     Wt: #       Reps:  8-15    *Increase wt. after completing 15 reps with an RPE of <12/13.   Home Exercise  *Jon verbalizes planning to swim and use elliptical 3-4 days/week for 30 min on days not in rehab. Home Exercise  *Jon plans to swim and

## 2025-06-03 ENCOUNTER — TELEPHONE (OUTPATIENT)
Dept: CARDIOLOGY CLINIC | Age: 75
End: 2025-06-03

## 2025-06-03 ENCOUNTER — HOSPITAL ENCOUNTER (OUTPATIENT)
Dept: CARDIAC REHAB | Age: 75
Setting detail: THERAPIES SERIES
Discharge: HOME | End: 2025-06-03
Payer: MEDICARE

## 2025-06-03 PROCEDURE — 93798 PHYS/QHP OP CAR RHAB W/ECG: CPT

## 2025-06-03 NOTE — TELEPHONE ENCOUNTER
Pre op Risk Assessment    Procedure Colonoscopy and EGD  Physician [facility]  Date of surgery/procedure TBD    Last OV 4/28/2025  Last Stress 4/30/2025  Last Echo 4/29/2025  Last Cath 5/14/2025  Last Stent No PCI, hx of TAVR at outside hospital  Is patient on blood thinners Plavix, ASA  Hold Meds/how many days 5      Fax to [phone]

## 2025-06-05 ENCOUNTER — APPOINTMENT (OUTPATIENT)
Dept: CARDIAC REHAB | Age: 75
End: 2025-06-05
Payer: MEDICARE

## 2025-06-10 ENCOUNTER — HOSPITAL ENCOUNTER (OUTPATIENT)
Dept: CARDIAC REHAB | Age: 75
Setting detail: THERAPIES SERIES
Discharge: HOME | End: 2025-06-10
Payer: MEDICARE

## 2025-06-10 PROCEDURE — 93798 PHYS/QHP OP CAR RHAB W/ECG: CPT

## 2025-06-10 NOTE — PLAN OF CARE
anti-depressant or anti-anxiety medications?  [] Yes      [x] No  Change in anti-depressant or anti-anxiety medications?  [] Yes      [x] No  If yes, please list medications:     *Education* *Education* *Education* *Education* *Education* *Education*  *Education*   Healthy Mind-Set Workshops Recommended    [] Healthy Minds, Bodies,Hearts video Healthy Mind-Set Workshops    See Education Videos under Risk Factors             *Goals* *Goals* *Goals* *Goals* *Goals* *Goals*  *Goals*   Goal Status   [x] Initial   [] In Progress   [] Met Goal Status   [] Initial   [] In Progress   [] Met Goal Status   [] Initial   [x] In Progress   [] Met Goal Status   [] Initial   [x] In Progress   [] Met Goal Status   [] Initial   [x] In Progress   [] Met Goal Status   [] Initial   [x] In Progress   [] Met  Goal Status   [] Initial   [] In Progress   [x] Met   Psychosocial Goals:  Jon's psychosocial goals are  to maintain self reported depression and anxiety levels and PHQ9 score. Progress towards goals:  Jon    unable to assess Pt not present      Progress towards goals:  Jon progressing toward goals.     Progress towards goals:  Jon is progressing by maintaining levels of depression and anxiety     Progress towards goals:  Jon is progressing and working on maintaining depression and anxiety levels     Progress towards goals:  Jon is progressing and working on maintaining depression and anxiety levels  Progress towards goals:  Jon   [x] achieved set goals  [] Has not achieved set goal  **     Individual Cardiac Treatment Plan - Other Core Components  OTHER CORE COMPONENT  ASSESSMENT/PLAN OTHER CORE COMPONENT  REASSESSMENT  OTHER CORE COMPONENT  REASSESSMENT OTHER CORE COMPONENT  REASSESSMENT OTHER CORE COMPONENT  DISCHARGE/FOLLOW-UP OTHER CORE COMPONENT  DISCHARGE/FOLLOW-UP  OTHER CORE COMPONENT  DISCHARGE/FOLLOW-UP   Stages of Change Stages of Change Stages of Change Stages of Change Stages of Change Stages of

## 2025-06-12 ENCOUNTER — APPOINTMENT (OUTPATIENT)
Dept: CARDIAC REHAB | Age: 75
End: 2025-06-12
Payer: MEDICARE

## 2025-07-22 ENCOUNTER — HOSPITAL ENCOUNTER (OUTPATIENT)
Age: 75
Discharge: HOME | End: 2025-07-24
Attending: INTERNAL MEDICINE
Payer: OTHER GOVERNMENT

## 2025-07-22 ENCOUNTER — LAB (OUTPATIENT)
Dept: LAB | Age: 75
End: 2025-07-22

## 2025-07-22 DIAGNOSIS — Z95.2 S/P TAVR (TRANSCATHETER AORTIC VALVE REPLACEMENT): ICD-10-CM

## 2025-07-22 LAB
ANION GAP SERPL CALC-SCNC: 17 MEQ/L (ref 8–16)
BUN SERPL-MCNC: 19 MG/DL (ref 8–23)
CALCIUM SERPL-MCNC: 9.6 MG/DL (ref 8.8–10.2)
CHLORIDE SERPL-SCNC: 100 MEQ/L (ref 98–111)
CO2 SERPL-SCNC: 19 MEQ/L (ref 22–29)
CREAT SERPL-MCNC: 1 MG/DL (ref 0.7–1.2)
DEPRECATED RDW RBC AUTO: 43.6 FL (ref 35–45)
ECHO AO ASC DIAM: 3.7 CM
ECHO AR MAX VEL PISA: 4.1 M/S
ECHO AV AREA PEAK VELOCITY: 2.2 CM2
ECHO AV AREA VTI: 2.1 CM2
ECHO AV MEAN GRADIENT: 16 MMHG
ECHO AV MEAN VELOCITY: 1.9 M/S
ECHO AV PEAK GRADIENT: 27 MMHG
ECHO AV PEAK VELOCITY: 2.6 M/S
ECHO AV REGURGITANT PHT: 613 MS
ECHO AV VELOCITY RATIO: 0.58
ECHO AV VTI: 57 CM
ECHO EST RA PRESSURE: 3 MMHG
ECHO LA AREA 2C: 19.1 CM2
ECHO LA AREA 4C: 20.9 CM2
ECHO LA DIAMETER: 4.9 CM
ECHO LA MAJOR AXIS: 6.6 CM
ECHO LA MINOR AXIS: 5.9 CM
ECHO LA VOL BP: 53 ML (ref 18–58)
ECHO LA VOL MOD A2C: 51 ML (ref 18–58)
ECHO LA VOL MOD A4C: 51 ML (ref 18–58)
ECHO LV E' LATERAL VELOCITY: 7.8 CM/S
ECHO LV E' SEPTAL VELOCITY: 6.6 CM/S
ECHO LV EF PHYSICIAN: 55 %
ECHO LV FRACTIONAL SHORTENING: 36 % (ref 28–44)
ECHO LV INTERNAL DIMENSION DIASTOLIC: 4.7 CM (ref 4.2–5.9)
ECHO LV INTERNAL DIMENSION SYSTOLIC: 3 CM
ECHO LV ISOVOLUMETRIC RELAXATION TIME (IVRT): 70 MS
ECHO LV IVSD: 1.2 CM (ref 0.6–1)
ECHO LV MASS 2D: 224.8 G (ref 88–224)
ECHO LV POSTERIOR WALL DIASTOLIC: 1.3 CM (ref 0.6–1)
ECHO LV RELATIVE WALL THICKNESS RATIO: 0.55
ECHO LVOT AREA: 3.8 CM2
ECHO LVOT AV VTI INDEX: 0.55
ECHO LVOT DIAM: 2.2 CM
ECHO LVOT MEAN GRADIENT: 5 MMHG
ECHO LVOT PEAK GRADIENT: 9 MMHG
ECHO LVOT PEAK VELOCITY: 1.5 M/S
ECHO LVOT SV: 120.1 ML
ECHO LVOT VTI: 31.6 CM
ECHO MV A VELOCITY: 1.08 M/S
ECHO MV E DECELERATION TIME (DT): 311 MS
ECHO MV E VELOCITY: 0.81 M/S
ECHO MV E/A RATIO: 0.75
ECHO MV E/E' LATERAL: 10.38
ECHO MV E/E' RATIO (AVERAGED): 11.33
ECHO MV E/E' SEPTAL: 12.27
ECHO PV MAX VELOCITY: 0.6 M/S
ECHO PV PEAK GRADIENT: 1 MMHG
ECHO RIGHT VENTRICULAR SYSTOLIC PRESSURE (RVSP): 32 MMHG
ECHO RV INTERNAL DIMENSION: 4.1 CM
ECHO RV TAPSE: 1.8 CM (ref 1.7–?)
ECHO TV E WAVE: 0.4 M/S
ECHO TV REGURGITANT MAX VELOCITY: 2.67 M/S
ECHO TV REGURGITANT PEAK GRADIENT: 29 MMHG
ERYTHROCYTE [DISTWIDTH] IN BLOOD BY AUTOMATED COUNT: 13.5 % (ref 11.5–14.5)
GFR SERPL CREATININE-BSD FRML MDRD: 78 ML/MIN/1.73M2
GLUCOSE SERPL-MCNC: 169 MG/DL (ref 74–109)
HCT VFR BLD AUTO: 36.3 % (ref 42–52)
HGB BLD-MCNC: 12.9 GM/DL (ref 14–18)
MCH RBC QN AUTO: 31.7 PG (ref 26–33)
MCHC RBC AUTO-ENTMCNC: 35.5 GM/DL (ref 32.2–35.5)
MCV RBC AUTO: 89.2 FL (ref 80–94)
PLATELET # BLD AUTO: 121 THOU/MM3 (ref 130–400)
PMV BLD AUTO: 11.1 FL (ref 9.4–12.4)
POTASSIUM SERPL-SCNC: 4.3 MEQ/L (ref 3.5–5.2)
RBC # BLD AUTO: 4.07 MILL/MM3 (ref 4.7–6.1)
SODIUM SERPL-SCNC: 136 MEQ/L (ref 135–145)
WBC # BLD AUTO: 4.3 THOU/MM3 (ref 4.8–10.8)

## 2025-07-22 PROCEDURE — 93306 TTE W/DOPPLER COMPLETE: CPT

## 2025-07-22 PROCEDURE — 93306 TTE W/DOPPLER COMPLETE: CPT | Performed by: INTERNAL MEDICINE

## 2025-07-23 NOTE — PATIENT INSTRUCTIONS
Your staff today were Ana   Your provider today was Dr. Navarrete  Phone number: [phone]

## 2025-07-24 ENCOUNTER — OFFICE VISIT (OUTPATIENT)
Dept: CARDIOLOGY CLINIC | Age: 75
End: 2025-07-24
Payer: OTHER GOVERNMENT

## 2025-07-24 VITALS
HEIGHT: 70 IN | HEART RATE: 57 BPM | OXYGEN SATURATION: 97 % | WEIGHT: 270 LBS | BODY MASS INDEX: 38.65 KG/M2 | SYSTOLIC BLOOD PRESSURE: 122 MMHG | DIASTOLIC BLOOD PRESSURE: 72 MMHG

## 2025-07-24 DIAGNOSIS — Z95.2 S/P TAVR (TRANSCATHETER AORTIC VALVE REPLACEMENT): Primary | ICD-10-CM

## 2025-07-24 PROCEDURE — 1159F MED LIST DOCD IN RCRD: CPT | Performed by: INTERNAL MEDICINE

## 2025-07-24 PROCEDURE — 1123F ACP DISCUSS/DSCN MKR DOCD: CPT | Performed by: INTERNAL MEDICINE

## 2025-07-24 PROCEDURE — 3074F SYST BP LT 130 MM HG: CPT | Performed by: INTERNAL MEDICINE

## 2025-07-24 PROCEDURE — 99214 OFFICE O/P EST MOD 30 MIN: CPT | Performed by: INTERNAL MEDICINE

## 2025-07-24 PROCEDURE — 3078F DIAST BP <80 MM HG: CPT | Performed by: INTERNAL MEDICINE

## 2025-07-24 NOTE — PROGRESS NOTES
[facility] [place] [facility]  [facility] CARDIOLOGY  [address]  [address]  [place] [address]  Dept: [phone]  Dept Fax: [phone]  Loc: [phone]    Visit Date: 7/24/2025    Mr. Ji is a 74 y.o. male  who presented for:  Chief Complaint   Patient presents with    1 Year Follow Up       HPI:     History of Present Illness  The patient is a 74-year-old male who presents for evaluation of shortness of breath.    He reports an improvement in his overall health since his last visit. However, he experiences shortness of breath during swimming activities. He has been gradually losing weight, with a recent loss of 5 pounds. He recalls a time when he weighed nearly 300 pounds but did not experience significant difficulty while swimming. He maintains an active lifestyle, including mowing his lawn and exercising five days a week, which includes swimming and using the fitness room. He also spends about 20 minutes on the elliptical machine and is working towards increasing this to 30 minutes.    He expresses concern about his red blood cell count, which remains low following a significant blood loss event approximately a year ago that led to anemia.  No chest pain, angina, JONES, orthopnea, PND, sob at rest, palpitations, LE edema, or syncope.        PAST SURGICAL HISTORY:  The patient underwent a valve replacement procedure.     SOCIAL HISTORY  Marital Status:  for 57-58 years.  Exercise: Walks while mowing the yard, exercises 5 days a week including swimming and using the fitness room, uses the elliptical for about 20 minutes aiming to increase to 30 minutes.         TTE  7/2025:    Left Ventricle: Normal left ventricular systolic function with a visually estimated EF of 55 - 60%. Left ventricle size is normal. Normal wall thickness. Normal wall motion. Diastolic dysfunction present with normal LV EF.    Right Ventricle: Right ventricle size is normal. Normal systolic

## 2025-08-01 ENCOUNTER — LAB (OUTPATIENT)
Dept: LAB | Age: 75
End: 2025-08-01

## 2025-08-01 LAB
ALBUMIN SERPL BCG-MCNC: 4.4 G/DL (ref 3.4–4.9)
ALP SERPL-CCNC: 70 U/L (ref 40–129)
ALT SERPL W/O P-5'-P-CCNC: 35 U/L (ref 10–50)
AST SERPL-CCNC: 42 U/L (ref 10–50)
BILIRUB CONJ SERPL-MCNC: 0.6 MG/DL (ref 0–0.2)
BILIRUB SERPL-MCNC: 1.5 MG/DL (ref 0.3–1.2)
MAGNESIUM SERPL-MCNC: 1.5 MG/DL (ref 1.6–2.6)
PROT SERPL-MCNC: 7.4 G/DL (ref 6.4–8.3)

## 2025-08-18 ENCOUNTER — HOSPITAL ENCOUNTER (OUTPATIENT)
Dept: PULMONOLOGY | Age: 75
Discharge: HOME | End: 2025-08-18
Payer: OTHER GOVERNMENT

## 2025-08-18 DIAGNOSIS — Z87.891 PERSONAL HISTORY OF SMOKING: ICD-10-CM

## 2025-08-18 DIAGNOSIS — J44.9 STAGE 2 MODERATE COPD BY GOLD CLASSIFICATION (HCC): ICD-10-CM

## 2025-08-18 DIAGNOSIS — E66.9 OBESITY (BMI 30-39.9): ICD-10-CM

## 2025-08-18 PROCEDURE — 94060 EVALUATION OF WHEEZING: CPT

## 2025-08-18 PROCEDURE — 94729 DIFFUSING CAPACITY: CPT

## 2025-08-18 PROCEDURE — 94726 PLETHYSMOGRAPHY LUNG VOLUMES: CPT

## (undated) DEVICE — SOLUTION IV IRRIG WATER 1000ML POUR BRL 2F7114

## (undated) DEVICE — SOLUTION IRRIG 3000ML 0.9% SOD CHL USP UROMATIC PLAS CONT

## (undated) DEVICE — CYSTO PACK: Brand: MEDLINE INDUSTRIES, INC.

## (undated) DEVICE — GUIDEWIRE URO L150CM DIA0.035IN STIFF NIT HYDRPHLC STR TIP

## (undated) DEVICE — CATHETER URETH 22FR 30CC BLLN F 3 W SPEC M RND TIP TWO

## (undated) DEVICE — SOLUTION IRRIG 2000ML STRL H2O UROMATIC PLAS CONT USP

## (undated) DEVICE — S-CURVE URETHRAL DILATOR SET WITH AQ, HYDROPHILIC COATING: Brand: S~CURVE

## (undated) DEVICE — DRAINBAG,ANTI-REFLUX TOWER,L/F,2000ML,LL: Brand: MEDLINE

## (undated) DEVICE — CATHETER,FOLEY,SILI-ELAST,LTX,16FR,10ML: Brand: MEDLINE

## (undated) DEVICE — 20 ML SYRINGE LUER-LOCK TIP: Brand: MONOJECT

## (undated) DEVICE — LASER SURG W22XH58IN D36IN 475LB SLD STATE FREQ DOUBLED